# Patient Record
Sex: FEMALE | Race: WHITE | NOT HISPANIC OR LATINO | Employment: OTHER | URBAN - NONMETROPOLITAN AREA
[De-identification: names, ages, dates, MRNs, and addresses within clinical notes are randomized per-mention and may not be internally consistent; named-entity substitution may affect disease eponyms.]

---

## 2017-05-25 ENCOUNTER — OFFICE VISIT (OUTPATIENT)
Dept: CARDIOLOGY | Facility: CLINIC | Age: 72
End: 2017-05-25

## 2017-05-25 VITALS
BODY MASS INDEX: 24.92 KG/M2 | DIASTOLIC BLOOD PRESSURE: 70 MMHG | WEIGHT: 132 LBS | HEIGHT: 61 IN | HEART RATE: 66 BPM | SYSTOLIC BLOOD PRESSURE: 129 MMHG

## 2017-05-25 DIAGNOSIS — I48.0 AF (PAROXYSMAL ATRIAL FIBRILLATION) (HCC): Primary | ICD-10-CM

## 2017-05-25 DIAGNOSIS — I10 ESSENTIAL HYPERTENSION: ICD-10-CM

## 2017-05-25 PROCEDURE — 99212 OFFICE O/P EST SF 10 MIN: CPT | Performed by: INTERNAL MEDICINE

## 2018-05-31 ENCOUNTER — OFFICE VISIT (OUTPATIENT)
Dept: CARDIOLOGY | Facility: CLINIC | Age: 73
End: 2018-05-31

## 2018-05-31 VITALS
SYSTOLIC BLOOD PRESSURE: 124 MMHG | HEART RATE: 56 BPM | WEIGHT: 135 LBS | BODY MASS INDEX: 25.49 KG/M2 | DIASTOLIC BLOOD PRESSURE: 70 MMHG | HEIGHT: 61 IN

## 2018-05-31 DIAGNOSIS — I48.0 AF (PAROXYSMAL ATRIAL FIBRILLATION) (HCC): Primary | ICD-10-CM

## 2018-05-31 DIAGNOSIS — I10 ESSENTIAL HYPERTENSION: ICD-10-CM

## 2018-05-31 PROCEDURE — 99213 OFFICE O/P EST LOW 20 MIN: CPT | Performed by: INTERNAL MEDICINE

## 2018-05-31 RX ORDER — MELATONIN
DAILY
Status: ON HOLD | COMMUNITY
End: 2021-07-09

## 2018-05-31 RX ORDER — MULTIVIT-MIN/IRON/FOLIC ACID/K 18-600-40
2000 CAPSULE ORAL DAILY
Status: ON HOLD | COMMUNITY
End: 2021-07-09

## 2018-05-31 NOTE — PROGRESS NOTES
Subjective:   Kade Tillman  1945  [phone]      05/31/2018    [facility] CARDIOLOGY    Boaz Lorenzo MD  [address] [facility] [address]    REFERRING DOCTOR: Boaz Lorenzo      Patient ID: Kade Tillman is a 72 y.o. female.    Chief Complaint: Afib    Problem List:  Af (Paroxysmal Atrial Fibrillation)     A. Onset 2003 - had SAVANA in preparation for ECV, self-converted to NSR  B. No further episodes until fall 2015 during colonoscopy  C. CHADSVASC Score 3 (age 70, female, HTN)  D. Echo 4/28/16 - EF >65%, normal valves, LA 4cm  E. Failed flecainide & propafenone (recurrence) -  F. PVA 2016 Dr. Chow, no recurrent afib      Essential Hypertension         No Known Allergies    Current Outpatient Prescriptions:   •  Acetaminophen 500 MG coapsule, Take 1 tablet by mouth as needed., Disp: , Rfl:   •  aspirin 81 MG tablet, Take 1 tablet by mouth Daily., Disp: 30 tablet, Rfl: 11  •  B Complex Vitamins (VITAMIN B COMPLEX) tablet, Take 1 tablet by mouth daily., Disp: , Rfl:   •  Calcium 500-125 MG-UNIT tablet, Take 1 tablet by mouth daily., Disp: , Rfl:   •  Cholecalciferol (VITAMIN D) 2000 units capsule, Take 2,000 Units by mouth Daily., Disp: , Rfl:   •  fluticasone (FLONASE) 50 MCG/ACT nasal spray, 1 spray into each nostril daily as needed., Disp: , Rfl:   •  hydrochlorothiazide (HYDRODIURIL) 25 MG tablet, Take 25 mg by mouth daily., Disp: , Rfl:   •  levothyroxine (SYNTHROID, LEVOTHROID) 25 MCG tablet, Take 25 mcg by mouth daily., Disp: , Rfl:   •  lisinopril (PRINIVIL,ZESTRIL) 10 MG tablet, Take 10 mg by mouth daily.  Restart today, Disp: , Rfl:   •  Loratadine 10 MG capsule, Take 10 mg by mouth daily as needed., Disp: , Rfl:   •  Potassium Gluconate 550 MG tablet, Take 1 tablet by mouth daily., Disp: , Rfl:   •  pravastatin (PRAVACHOL) 40 MG tablet, Take 40 mg by mouth daily., Disp: , Rfl:   •  vitamin B-12 (CYANOCOBALAMIN) 1000 MCG tablet, Take 1 tablet by mouth  "daily., Disp: , Rfl:   •  vitamin C (ASCORBIC ACID) 500 MG tablet, Take 500 mg by mouth daily., Disp: , Rfl:   •  cholecalciferol (VITAMIN D3) 1000 units tablet, Take  by mouth Daily., Disp: , Rfl:     History of Present Illness  Patient is a 72 year old female with a history of paroxysmal symptomatic atrial fibrillation status post pulmonary vein ablation in the past and hypertension who is here today for follow-up visit.  Doing very well with no recurrence of any atrial fibrillation.  Blood pressure has been well-controlled as well.    No issues with chest pain, shortness of breath, fevers, chills, night sweats, PND, orthopnea or palpitations. No recent ER visits or hospital stays.      The following portions of the patient's history were reviewed and updated as appropriate: allergies, current medications, past family history, past medical history, past social history, past surgical history and problem list.    ROS   14 point ROS negative except as outlined in problem list, HPI and other parts of the note.    Procedures       Objective:       Vitals:    05/31/18 0937   BP: 124/70   BP Location: Right arm   Patient Position: Sitting   Pulse: 56   Weight: 61.2 kg (135 lb)   Height: 154.9 cm (61\")       GENERAL: Well-developed, well-nourished patient in no acute distress.  HEENT: Normocephalic, atraumatic, PERRLA. Moist mucous membranes.  NECK: No JVD present at 30°. No carotid bruits auscultated.  LUNGS: Clear to auscultation.  CARDIOVASCULAR: Heart has a regular rate and rhythm. No murmurs, gallops or rubs noted.   ABDOMEN: Soft, nontender. Positive bowel sounds.  MUSCULOSKELETAL: No gross deformities. No clubbing, cyanosis, or lower extremity edema.  SKIN: Pink, warm  Neuro: Nonfocal exam. Gait intact  Ext: No edema or bruising    The patient's old records including ambulatory rhythm recordings (ECGs, Holter/event monitor) were reviewed and discussed.      Lab Review:   Results for orders placed or performed in " visit on 07/18/16   CBC (No diff)   Result Value Ref Range    WBC 11.22 (H) 3.50 - 10.80 10*3/mm3    RBC 3.78 (L) 3.89 - 5.14 10*6/mm3    Hemoglobin 11.3 (L) 11.5 - 15.5 g/dL    Hematocrit 33.5 (L) 34.5 - 44.0 %    MCV 88.6 80.0 - 99.0 fL    MCH 29.9 27.0 - 31.0 pg    MCHC 33.7 32.0 - 36.0 g/dL    RDW 13.5 11.3 - 14.5 %    RDW-SD 43.5 37.0 - 54.0 fl    MPV 9.9 6.0 - 12.0 fL    Platelets 279 150 - 450 10*3/mm3   Basic metabolic panel   Result Value Ref Range    Glucose 101 (H) 70 - 100 mg/dL    BUN 17 9 - 23 mg/dL    Creatinine 0.60 0.60 - 1.30 mg/dL    Sodium 142 132 - 146 mmol/L    Potassium 4.0 3.5 - 5.5 mmol/L    Chloride 102 99 - 109 mmol/L    CO2 29.0 20.0 - 31.0 mmol/L    Calcium 10.1 8.7 - 10.4 mg/dL    eGFR Non African Amer 99 >60 mL/min/1.73    BUN/Creatinine Ratio 28.3 (H) 7.0 - 25.0    Anion Gap 11.0 3.0 - 11.0 mmol/L   DUPLEX GROIN PSEUDOANEURYSM CAR   Result Value Ref Range    Right CFA prox sys .0 cm/sec    PROX PFA PSV RIGHT -101.0 cm/sec    PROX SFA PSV RIGHT -90.8 cm/sec    MID SFA PSV RIGHT -105.0 cm/sec    DIST SFA PSV RIGHT -99.2 cm/sec    PROX POP A PSV RIGHT 62.4 cm/sec    DIST POP A PSV RIGHT -57.2 cm/sec           Diagnosis:   1. AF (paroxysmal atrial fibrillation)  2. Essential hypertension      Assessment & Plan:   1.  Paroxysmal symptomatic atrial fibrillation with failed antiarrhythmic drugs now status post pulmonary vein ablation by Dr. Chow no recurrences.  She is doing well.  We will continue on just aspirin for now.  2.  Hypertension controlled continue on current therapy.  3.  Hypothyroidism on supplement.  4.  Follow up as needed.         CC: Boaz White,    05/31/18  9:44 AM      EMR Dragon/Transcription disclaimer:  Much of this encounter note is an electronic transcription/translation of spoken language to printed text. Electronic translation of spoken language may permit erroneous, or at times, nonsensical words or phrases to be inadvertently  transcribed. Although I have reviewed the note for such errors, some may still exist.

## 2021-06-11 ENCOUNTER — APPOINTMENT (OUTPATIENT)
Dept: GENERAL RADIOLOGY | Facility: HOSPITAL | Age: 76
End: 2021-06-11

## 2021-06-11 ENCOUNTER — APPOINTMENT (OUTPATIENT)
Dept: CT IMAGING | Facility: HOSPITAL | Age: 76
End: 2021-06-11

## 2021-06-11 ENCOUNTER — HOSPITAL ENCOUNTER (INPATIENT)
Facility: HOSPITAL | Age: 76
LOS: 2 days | Discharge: HOME | End: 2021-06-13
Attending: EMERGENCY MEDICINE | Admitting: INTERNAL MEDICINE

## 2021-06-11 DIAGNOSIS — R55 SYNCOPE, UNSPECIFIED SYNCOPE TYPE: ICD-10-CM

## 2021-06-11 DIAGNOSIS — R53.1 GENERALIZED WEAKNESS: ICD-10-CM

## 2021-06-11 DIAGNOSIS — K92.2 ACUTE UPPER GI BLEED: Primary | ICD-10-CM

## 2021-06-11 DIAGNOSIS — K92.0 HEMATEMESIS WITH NAUSEA: ICD-10-CM

## 2021-06-11 DIAGNOSIS — E87.6 HYPOKALEMIA: ICD-10-CM

## 2021-06-11 DIAGNOSIS — K31.89 GASTRIC MASS: ICD-10-CM

## 2021-06-11 PROBLEM — D72.829 LEUKOCYTOSIS: Status: ACTIVE | Noted: 2021-06-11

## 2021-06-11 LAB
ABO GROUP BLD: NORMAL
ALBUMIN SERPL-MCNC: 3.9 G/DL (ref 3.5–5.2)
ALBUMIN/GLOB SERPL: 1.6 G/DL
ALP SERPL-CCNC: 66 U/L (ref 39–117)
ALT SERPL W P-5'-P-CCNC: 12 U/L (ref 1–33)
ANION GAP SERPL CALCULATED.3IONS-SCNC: 11 MMOL/L (ref 5–15)
AST SERPL-CCNC: 17 U/L (ref 1–32)
BASOPHILS # BLD AUTO: 0.07 10*3/MM3 (ref 0–0.2)
BASOPHILS NFR BLD AUTO: 0.4 % (ref 0–1.5)
BILIRUB SERPL-MCNC: 0.2 MG/DL (ref 0–1.2)
BLD GP AB SCN SERPL QL: NEGATIVE
BUN SERPL-MCNC: 51 MG/DL (ref 8–23)
BUN/CREAT SERPL: 85 (ref 7–25)
CALCIUM SPEC-SCNC: 10.3 MG/DL (ref 8.6–10.5)
CHLORIDE SERPL-SCNC: 97 MMOL/L (ref 98–107)
CO2 SERPL-SCNC: 27 MMOL/L (ref 22–29)
CREAT SERPL-MCNC: 0.6 MG/DL (ref 0.57–1)
D-LACTATE SERPL-SCNC: 1.6 MMOL/L (ref 0.5–2)
DEPRECATED RDW RBC AUTO: 44.9 FL (ref 37–54)
EOSINOPHIL # BLD AUTO: 0.04 10*3/MM3 (ref 0–0.4)
EOSINOPHIL NFR BLD AUTO: 0.2 % (ref 0.3–6.2)
ERYTHROCYTE [DISTWIDTH] IN BLOOD BY AUTOMATED COUNT: 13.5 % (ref 12.3–15.4)
FLUAV RNA RESP QL NAA+PROBE: NOT DETECTED
FLUBV RNA RESP QL NAA+PROBE: NOT DETECTED
GFR SERPL CREATININE-BSD FRML MDRD: 97 ML/MIN/1.73
GLOBULIN UR ELPH-MCNC: 2.4 GM/DL
GLUCOSE SERPL-MCNC: 129 MG/DL (ref 65–99)
HCT VFR BLD AUTO: 33.6 % (ref 34–46.6)
HGB BLD-MCNC: 10.9 G/DL (ref 12–15.9)
HOLD SPECIMEN: NORMAL
HOLD SPECIMEN: NORMAL
IMM GRANULOCYTES # BLD AUTO: 0.08 10*3/MM3 (ref 0–0.05)
IMM GRANULOCYTES NFR BLD AUTO: 0.5 % (ref 0–0.5)
LIPASE SERPL-CCNC: 28 U/L (ref 13–60)
LYMPHOCYTES # BLD AUTO: 3.62 10*3/MM3 (ref 0.7–3.1)
LYMPHOCYTES NFR BLD AUTO: 21 % (ref 19.6–45.3)
MAGNESIUM SERPL-MCNC: 2.1 MG/DL (ref 1.6–2.4)
MCH RBC QN AUTO: 29.5 PG (ref 26.6–33)
MCHC RBC AUTO-ENTMCNC: 32.4 G/DL (ref 31.5–35.7)
MCV RBC AUTO: 90.8 FL (ref 79–97)
MONOCYTES # BLD AUTO: 1.14 10*3/MM3 (ref 0.1–0.9)
MONOCYTES NFR BLD AUTO: 6.6 % (ref 5–12)
NEUTROPHILS NFR BLD AUTO: 12.25 10*3/MM3 (ref 1.7–7)
NEUTROPHILS NFR BLD AUTO: 71.3 % (ref 42.7–76)
NRBC BLD AUTO-RTO: 0 /100 WBC (ref 0–0.2)
PLATELET # BLD AUTO: 224 10*3/MM3 (ref 140–450)
PMV BLD AUTO: 10.5 FL (ref 6–12)
POTASSIUM SERPL-SCNC: 3.2 MMOL/L (ref 3.5–5.2)
PROCALCITONIN SERPL-MCNC: 0.04 NG/ML (ref 0–0.25)
PROT SERPL-MCNC: 6.3 G/DL (ref 6–8.5)
RBC # BLD AUTO: 3.7 10*6/MM3 (ref 3.77–5.28)
RH BLD: POSITIVE
SARS-COV-2 RNA RESP QL NAA+PROBE: NOT DETECTED
SODIUM SERPL-SCNC: 135 MMOL/L (ref 136–145)
T&S EXPIRATION DATE: NORMAL
WBC # BLD AUTO: 17.2 10*3/MM3 (ref 3.4–10.8)
WHOLE BLOOD HOLD SPECIMEN: NORMAL

## 2021-06-11 PROCEDURE — 85025 COMPLETE CBC W/AUTO DIFF WBC: CPT

## 2021-06-11 PROCEDURE — 86900 BLOOD TYPING SEROLOGIC ABO: CPT

## 2021-06-11 PROCEDURE — 99223 1ST HOSP IP/OBS HIGH 75: CPT | Performed by: INTERNAL MEDICINE

## 2021-06-11 PROCEDURE — 83735 ASSAY OF MAGNESIUM: CPT | Performed by: PHYSICIAN ASSISTANT

## 2021-06-11 PROCEDURE — 86850 RBC ANTIBODY SCREEN: CPT | Performed by: EMERGENCY MEDICINE

## 2021-06-11 PROCEDURE — 87636 SARSCOV2 & INF A&B AMP PRB: CPT | Performed by: EMERGENCY MEDICINE

## 2021-06-11 PROCEDURE — 99284 EMERGENCY DEPT VISIT MOD MDM: CPT

## 2021-06-11 PROCEDURE — 83690 ASSAY OF LIPASE: CPT | Performed by: EMERGENCY MEDICINE

## 2021-06-11 PROCEDURE — 74177 CT ABD & PELVIS W/CONTRAST: CPT

## 2021-06-11 PROCEDURE — 86901 BLOOD TYPING SEROLOGIC RH(D): CPT

## 2021-06-11 PROCEDURE — 86900 BLOOD TYPING SEROLOGIC ABO: CPT | Performed by: EMERGENCY MEDICINE

## 2021-06-11 PROCEDURE — 80053 COMPREHEN METABOLIC PANEL: CPT | Performed by: EMERGENCY MEDICINE

## 2021-06-11 PROCEDURE — 71045 X-RAY EXAM CHEST 1 VIEW: CPT

## 2021-06-11 PROCEDURE — 86923 COMPATIBILITY TEST ELECTRIC: CPT

## 2021-06-11 PROCEDURE — 83605 ASSAY OF LACTIC ACID: CPT | Performed by: EMERGENCY MEDICINE

## 2021-06-11 PROCEDURE — 25010000002 IOPAMIDOL 61 % SOLUTION: Performed by: EMERGENCY MEDICINE

## 2021-06-11 PROCEDURE — 85025 COMPLETE CBC W/AUTO DIFF WBC: CPT | Performed by: INTERNAL MEDICINE

## 2021-06-11 PROCEDURE — 84145 PROCALCITONIN (PCT): CPT | Performed by: FAMILY MEDICINE

## 2021-06-11 PROCEDURE — 93005 ELECTROCARDIOGRAM TRACING: CPT | Performed by: EMERGENCY MEDICINE

## 2021-06-11 PROCEDURE — 86901 BLOOD TYPING SEROLOGIC RH(D): CPT | Performed by: EMERGENCY MEDICINE

## 2021-06-11 PROCEDURE — 93005 ELECTROCARDIOGRAM TRACING: CPT

## 2021-06-11 RX ORDER — SODIUM CHLORIDE, SODIUM LACTATE, POTASSIUM CHLORIDE, CALCIUM CHLORIDE 600; 310; 30; 20 MG/100ML; MG/100ML; MG/100ML; MG/100ML
100 INJECTION, SOLUTION INTRAVENOUS CONTINUOUS
Status: DISCONTINUED | OUTPATIENT
Start: 2021-06-12 | End: 2021-06-13

## 2021-06-11 RX ORDER — LEVOTHYROXINE SODIUM 0.03 MG/1
25 TABLET ORAL
Status: DISCONTINUED | OUTPATIENT
Start: 2021-06-12 | End: 2021-06-13 | Stop reason: HOSPADM

## 2021-06-11 RX ORDER — PANTOPRAZOLE SODIUM 40 MG/10ML
80 INJECTION, POWDER, LYOPHILIZED, FOR SOLUTION INTRAVENOUS ONCE
Status: COMPLETED | OUTPATIENT
Start: 2021-06-11 | End: 2021-06-11

## 2021-06-11 RX ORDER — SODIUM CHLORIDE 0.9 % (FLUSH) 0.9 %
10 SYRINGE (ML) INJECTION EVERY 12 HOURS SCHEDULED
Status: DISCONTINUED | OUTPATIENT
Start: 2021-06-12 | End: 2021-06-13 | Stop reason: HOSPADM

## 2021-06-11 RX ORDER — ONDANSETRON 2 MG/ML
4 INJECTION INTRAMUSCULAR; INTRAVENOUS EVERY 6 HOURS PRN
Status: DISCONTINUED | OUTPATIENT
Start: 2021-06-11 | End: 2021-06-13 | Stop reason: HOSPADM

## 2021-06-11 RX ORDER — POTASSIUM CHLORIDE 750 MG/1
40 CAPSULE, EXTENDED RELEASE ORAL AS NEEDED
Status: DISCONTINUED | OUTPATIENT
Start: 2021-06-11 | End: 2021-06-13 | Stop reason: HOSPADM

## 2021-06-11 RX ORDER — SODIUM CHLORIDE 9 MG/ML
10 INJECTION INTRAVENOUS AS NEEDED
Status: DISCONTINUED | OUTPATIENT
Start: 2021-06-11 | End: 2021-06-13 | Stop reason: HOSPADM

## 2021-06-11 RX ORDER — SODIUM CHLORIDE 0.9 % (FLUSH) 0.9 %
10 SYRINGE (ML) INJECTION AS NEEDED
Status: DISCONTINUED | OUTPATIENT
Start: 2021-06-11 | End: 2021-06-13 | Stop reason: HOSPADM

## 2021-06-11 RX ORDER — CHOLECALCIFEROL (VITAMIN D3) 125 MCG
5 CAPSULE ORAL NIGHTLY PRN
Status: DISCONTINUED | OUTPATIENT
Start: 2021-06-11 | End: 2021-06-13 | Stop reason: HOSPADM

## 2021-06-11 RX ORDER — POTASSIUM CHLORIDE 7.45 MG/ML
10 INJECTION INTRAVENOUS
Status: DISCONTINUED | OUTPATIENT
Start: 2021-06-11 | End: 2021-06-13 | Stop reason: HOSPADM

## 2021-06-11 RX ORDER — POTASSIUM CHLORIDE 1.5 G/1.77G
40 POWDER, FOR SOLUTION ORAL AS NEEDED
Status: DISCONTINUED | OUTPATIENT
Start: 2021-06-11 | End: 2021-06-13 | Stop reason: HOSPADM

## 2021-06-11 RX ORDER — PRAVASTATIN SODIUM 40 MG
40 TABLET ORAL DAILY
Status: DISCONTINUED | OUTPATIENT
Start: 2021-06-12 | End: 2021-06-13 | Stop reason: HOSPADM

## 2021-06-11 RX ADMIN — PANTOPRAZOLE SODIUM 80 MG: 40 INJECTION, POWDER, FOR SOLUTION INTRAVENOUS at 21:24

## 2021-06-11 RX ADMIN — IOPAMIDOL 95 ML: 612 INJECTION, SOLUTION INTRAVENOUS at 22:22

## 2021-06-11 RX ADMIN — SODIUM CHLORIDE, POTASSIUM CHLORIDE, SODIUM LACTATE AND CALCIUM CHLORIDE 100 ML/HR: 600; 310; 30; 20 INJECTION, SOLUTION INTRAVENOUS at 23:53

## 2021-06-11 RX ADMIN — SODIUM CHLORIDE 1000 ML: 9 INJECTION, SOLUTION INTRAVENOUS at 21:24

## 2021-06-12 ENCOUNTER — ANESTHESIA EVENT (OUTPATIENT)
Dept: GASTROENTEROLOGY | Facility: HOSPITAL | Age: 76
End: 2021-06-12

## 2021-06-12 ENCOUNTER — APPOINTMENT (OUTPATIENT)
Dept: CARDIOLOGY | Facility: HOSPITAL | Age: 76
End: 2021-06-12

## 2021-06-12 ENCOUNTER — ANESTHESIA (OUTPATIENT)
Dept: GASTROENTEROLOGY | Facility: HOSPITAL | Age: 76
End: 2021-06-12

## 2021-06-12 ENCOUNTER — APPOINTMENT (OUTPATIENT)
Dept: CT IMAGING | Facility: HOSPITAL | Age: 76
End: 2021-06-12

## 2021-06-12 PROBLEM — R93.5 ABNORMAL CT OF THE ABDOMEN: Status: ACTIVE | Noted: 2021-06-12

## 2021-06-12 LAB
ABO GROUP BLD: NORMAL
ANION GAP SERPL CALCULATED.3IONS-SCNC: 8 MMOL/L (ref 5–15)
BACTERIA UR QL AUTO: NORMAL /HPF
BASOPHILS # BLD AUTO: 0.03 10*3/MM3 (ref 0–0.2)
BASOPHILS NFR BLD AUTO: 0.3 % (ref 0–1.5)
BH CV ECHO MEAS - AO MAX PG (FULL): 4.7 MMHG
BH CV ECHO MEAS - AO MAX PG: 11 MMHG
BH CV ECHO MEAS - AO MEAN PG (FULL): 2.8 MMHG
BH CV ECHO MEAS - AO MEAN PG: 5.8 MMHG
BH CV ECHO MEAS - AO ROOT AREA (BSA CORRECTED): 2
BH CV ECHO MEAS - AO ROOT AREA: 7.5 CM^2
BH CV ECHO MEAS - AO ROOT DIAM: 3.1 CM
BH CV ECHO MEAS - AO V2 MAX: 168.3 CM/SEC
BH CV ECHO MEAS - AO V2 MEAN: 114.8 CM/SEC
BH CV ECHO MEAS - AO V2 VTI: 30.6 CM
BH CV ECHO MEAS - AVA(I,A): 2.1 CM^2
BH CV ECHO MEAS - AVA(I,D): 2.1 CM^2
BH CV ECHO MEAS - AVA(V,A): 2.3 CM^2
BH CV ECHO MEAS - AVA(V,D): 2.3 CM^2
BH CV ECHO MEAS - BSA(HAYCOCK): 1.6 M^2
BH CV ECHO MEAS - BSA: 1.6 M^2
BH CV ECHO MEAS - BZI_BMI: 26.5 KILOGRAMS/M^2
BH CV ECHO MEAS - BZI_METRIC_HEIGHT: 152 CM
BH CV ECHO MEAS - BZI_METRIC_WEIGHT: 61.2 KG
BH CV ECHO MEAS - CI(CUBED): 3.5 L/MIN/M^2
BH CV ECHO MEAS - CI(MOD-SP2): 1.7 L/MIN/M^2
BH CV ECHO MEAS - CI(MOD-SP4): 3 L/MIN/M^2
BH CV ECHO MEAS - CI(TEICH): 3.1 L/MIN/M^2
BH CV ECHO MEAS - CO(CUBED): 5.5 L/MIN
BH CV ECHO MEAS - CO(MOD-SP2): 2.7 L/MIN
BH CV ECHO MEAS - CO(MOD-SP4): 4.8 L/MIN
BH CV ECHO MEAS - CO(TEICH): 4.9 L/MIN
BH CV ECHO MEAS - EDV(CUBED): 94.2 ML
BH CV ECHO MEAS - EDV(MOD-SP2): 46.8 ML
BH CV ECHO MEAS - EDV(MOD-SP4): 84.7 ML
BH CV ECHO MEAS - EDV(TEICH): 94.9 ML
BH CV ECHO MEAS - EF(CUBED): 72.8 %
BH CV ECHO MEAS - EF(MOD-SP2): 72 %
BH CV ECHO MEAS - EF(MOD-SP4): 70.8 %
BH CV ECHO MEAS - EF(TEICH): 64.7 %
BH CV ECHO MEAS - ESV(CUBED): 25.6 ML
BH CV ECHO MEAS - ESV(MOD-SP2): 13.1 ML
BH CV ECHO MEAS - ESV(MOD-SP4): 24.7 ML
BH CV ECHO MEAS - ESV(TEICH): 33.5 ML
BH CV ECHO MEAS - FS: 35.2 %
BH CV ECHO MEAS - IVS/LVPW: 0.95
BH CV ECHO MEAS - IVSD: 0.84 CM
BH CV ECHO MEAS - LA DIMENSION: 3.6 CM
BH CV ECHO MEAS - LA/AO: 1.2
BH CV ECHO MEAS - LAT PEAK E' VEL: 15.4 CM/SEC
BH CV ECHO MEAS - LATERAL E/E' RATIO: 4.5
BH CV ECHO MEAS - LV DIASTOLIC VOL/BSA (35-75): 53.7 ML/M^2
BH CV ECHO MEAS - LV MASS(C)D: 127.6 GRAMS
BH CV ECHO MEAS - LV MASS(C)DI: 80.9 GRAMS/M^2
BH CV ECHO MEAS - LV MAX PG: 6.3 MMHG
BH CV ECHO MEAS - LV MEAN PG: 3 MMHG
BH CV ECHO MEAS - LV SYSTOLIC VOL/BSA (12-30): 15.7 ML/M^2
BH CV ECHO MEAS - LV V1 MAX: 125.8 CM/SEC
BH CV ECHO MEAS - LV V1 MEAN: 81.9 CM/SEC
BH CV ECHO MEAS - LV V1 VTI: 20.3 CM
BH CV ECHO MEAS - LVIDD: 4.6 CM
BH CV ECHO MEAS - LVIDS: 2.9 CM
BH CV ECHO MEAS - LVLD AP2: 6.5 CM
BH CV ECHO MEAS - LVLD AP4: 7.1 CM
BH CV ECHO MEAS - LVLS AP2: 5.2 CM
BH CV ECHO MEAS - LVLS AP4: 6.6 CM
BH CV ECHO MEAS - LVOT AREA (M): 3.1 CM^2
BH CV ECHO MEAS - LVOT AREA: 3.1 CM^2
BH CV ECHO MEAS - LVOT DIAM: 2 CM
BH CV ECHO MEAS - LVPWD: 0.88 CM
BH CV ECHO MEAS - MED PEAK E' VEL: 10.4 CM/SEC
BH CV ECHO MEAS - MEDIAL E/E' RATIO: 6.7
BH CV ECHO MEAS - MM HR: 80 BPM
BH CV ECHO MEAS - MM R-R INT: 0.75 SEC
BH CV ECHO MEAS - MV A MAX VEL: 71.1 CM/SEC
BH CV ECHO MEAS - MV DEC TIME: 0.3 SEC
BH CV ECHO MEAS - MV E MAX VEL: 70 CM/SEC
BH CV ECHO MEAS - MV E/A: 0.98
BH CV ECHO MEAS - MV MAX PG: 2 MMHG
BH CV ECHO MEAS - MV MEAN PG: 2 MMHG
BH CV ECHO MEAS - PA ACC TIME: 0.08 SEC
BH CV ECHO MEAS - PA PR(ACCEL): 41 MMHG
BH CV ECHO MEAS - RAP SYSTOLE: 3 MMHG
BH CV ECHO MEAS - RVSP: 41 MMHG
BH CV ECHO MEAS - SI(AO): 146.4 ML/M^2
BH CV ECHO MEAS - SI(CUBED): 43.5 ML/M^2
BH CV ECHO MEAS - SI(LVOT): 40.3 ML/M^2
BH CV ECHO MEAS - SI(MOD-SP2): 21.4 ML/M^2
BH CV ECHO MEAS - SI(MOD-SP4): 38.1 ML/M^2
BH CV ECHO MEAS - SI(TEICH): 38.9 ML/M^2
BH CV ECHO MEAS - SV(AO): 230.7 ML
BH CV ECHO MEAS - SV(CUBED): 68.6 ML
BH CV ECHO MEAS - SV(LVOT): 63.5 ML
BH CV ECHO MEAS - SV(MOD-SP2): 33.7 ML
BH CV ECHO MEAS - SV(MOD-SP4): 60 ML
BH CV ECHO MEAS - SV(TEICH): 61.4 ML
BH CV ECHO MEAS - TAPSE (>1.6): 2.2 CM
BH CV ECHO MEAS - TR MAX PG: 38 MMHG
BH CV ECHO MEAS - TR MAX VEL: 307.4 CM/SEC
BH CV ECHO MEASUREMENTS AVERAGE E/E' RATIO: 5.43
BH CV XLRA - RV BASE: 4 CM
BH CV XLRA - RV LENGTH: 7.2 CM
BH CV XLRA - RV MID: 3.6 CM
BH CV XLRA - TDI S': 20.2 CM/SEC
BILIRUB UR QL STRIP: NEGATIVE
BUN SERPL-MCNC: 33 MG/DL (ref 8–23)
BUN/CREAT SERPL: 76.7 (ref 7–25)
CALCIUM SPEC-SCNC: 8.8 MG/DL (ref 8.6–10.5)
CHLORIDE SERPL-SCNC: 106 MMOL/L (ref 98–107)
CLARITY UR: CLEAR
CO2 SERPL-SCNC: 26 MMOL/L (ref 22–29)
COLOR UR: YELLOW
CREAT SERPL-MCNC: 0.43 MG/DL (ref 0.57–1)
DEPRECATED RDW RBC AUTO: 44.1 FL (ref 37–54)
DEPRECATED RDW RBC AUTO: 46 FL (ref 37–54)
EOSINOPHIL # BLD AUTO: 0.01 10*3/MM3 (ref 0–0.4)
EOSINOPHIL NFR BLD AUTO: 0.1 % (ref 0.3–6.2)
ERYTHROCYTE [DISTWIDTH] IN BLOOD BY AUTOMATED COUNT: 13.4 % (ref 12.3–15.4)
ERYTHROCYTE [DISTWIDTH] IN BLOOD BY AUTOMATED COUNT: 13.6 % (ref 12.3–15.4)
GFR SERPL CREATININE-BSD FRML MDRD: 143 ML/MIN/1.73
GLUCOSE SERPL-MCNC: 103 MG/DL (ref 65–99)
GLUCOSE UR STRIP-MCNC: NEGATIVE MG/DL
HCT VFR BLD AUTO: 26.7 % (ref 34–46.6)
HCT VFR BLD AUTO: 27.4 % (ref 34–46.6)
HCT VFR BLD AUTO: 28.1 % (ref 34–46.6)
HCT VFR BLD AUTO: 28.2 % (ref 34–46.6)
HCT VFR BLD AUTO: 28.2 % (ref 34–46.6)
HGB BLD-MCNC: 8.9 G/DL (ref 12–15.9)
HGB BLD-MCNC: 9 G/DL (ref 12–15.9)
HGB UR QL STRIP.AUTO: NEGATIVE
HOLD SPECIMEN: NORMAL
IMM GRANULOCYTES # BLD AUTO: 0.04 10*3/MM3 (ref 0–0.05)
IMM GRANULOCYTES NFR BLD AUTO: 0.3 % (ref 0–0.5)
KETONES UR QL STRIP: NEGATIVE
LEUKOCYTE ESTERASE UR QL STRIP.AUTO: ABNORMAL
LV EF 2D ECHO EST: 60 %
LYMPHOCYTES # BLD AUTO: 2.27 10*3/MM3 (ref 0.7–3.1)
LYMPHOCYTES NFR BLD AUTO: 19.8 % (ref 19.6–45.3)
MAXIMAL PREDICTED HEART RATE: 145 BPM
MCH RBC QN AUTO: 28.9 PG (ref 26.6–33)
MCH RBC QN AUTO: 29.5 PG (ref 26.6–33)
MCHC RBC AUTO-ENTMCNC: 31.9 G/DL (ref 31.5–35.7)
MCHC RBC AUTO-ENTMCNC: 32 G/DL (ref 31.5–35.7)
MCV RBC AUTO: 90.4 FL (ref 79–97)
MCV RBC AUTO: 92.5 FL (ref 79–97)
MONOCYTES # BLD AUTO: 0.48 10*3/MM3 (ref 0.1–0.9)
MONOCYTES NFR BLD AUTO: 4.2 % (ref 5–12)
NEUTROPHILS NFR BLD AUTO: 75.3 % (ref 42.7–76)
NEUTROPHILS NFR BLD AUTO: 8.66 10*3/MM3 (ref 1.7–7)
NITRITE UR QL STRIP: NEGATIVE
NRBC BLD AUTO-RTO: 0 /100 WBC (ref 0–0.2)
PH UR STRIP.AUTO: 5.5 [PH] (ref 5–8)
PLATELET # BLD AUTO: 191 10*3/MM3 (ref 140–450)
PLATELET # BLD AUTO: 192 10*3/MM3 (ref 140–450)
PMV BLD AUTO: 10.6 FL (ref 6–12)
PMV BLD AUTO: 10.7 FL (ref 6–12)
POTASSIUM SERPL-SCNC: 3.6 MMOL/L (ref 3.5–5.2)
PROT UR QL STRIP: NEGATIVE
QT INTERVAL: 386 MS
QTC INTERVAL: 404 MS
RBC # BLD AUTO: 3.05 10*6/MM3 (ref 3.77–5.28)
RBC # BLD AUTO: 3.11 10*6/MM3 (ref 3.77–5.28)
RBC # UR: NORMAL /HPF
REF LAB TEST METHOD: NORMAL
RH BLD: POSITIVE
SODIUM SERPL-SCNC: 140 MMOL/L (ref 136–145)
SP GR UR STRIP: 1.03 (ref 1–1.03)
SQUAMOUS #/AREA URNS HPF: NORMAL /[HPF]
STRESS TARGET HR: 123 BPM
UROBILINOGEN UR QL STRIP: ABNORMAL
WBC # BLD AUTO: 10.43 10*3/MM3 (ref 3.4–10.8)
WBC # BLD AUTO: 11.49 10*3/MM3 (ref 3.4–10.8)
WBC UR QL AUTO: NORMAL /HPF

## 2021-06-12 PROCEDURE — 93306 TTE W/DOPPLER COMPLETE: CPT

## 2021-06-12 PROCEDURE — 85014 HEMATOCRIT: CPT | Performed by: PHYSICIAN ASSISTANT

## 2021-06-12 PROCEDURE — 80048 BASIC METABOLIC PNL TOTAL CA: CPT | Performed by: PHYSICIAN ASSISTANT

## 2021-06-12 PROCEDURE — 25010000002 SUCCINYLCHOLINE PER 20 MG: Performed by: NURSE ANESTHETIST, CERTIFIED REGISTERED

## 2021-06-12 PROCEDURE — 25010000002 PROPOFOL 10 MG/ML EMULSION: Performed by: NURSE ANESTHETIST, CERTIFIED REGISTERED

## 2021-06-12 PROCEDURE — 99223 1ST HOSP IP/OBS HIGH 75: CPT | Performed by: NURSE PRACTITIONER

## 2021-06-12 PROCEDURE — 88184 FLOWCYTOMETRY/ TC 1 MARKER: CPT

## 2021-06-12 PROCEDURE — 85014 HEMATOCRIT: CPT | Performed by: INTERNAL MEDICINE

## 2021-06-12 PROCEDURE — 43239 EGD BIOPSY SINGLE/MULTIPLE: CPT | Performed by: INTERNAL MEDICINE

## 2021-06-12 PROCEDURE — 70450 CT HEAD/BRAIN W/O DYE: CPT

## 2021-06-12 PROCEDURE — 93306 TTE W/DOPPLER COMPLETE: CPT | Performed by: INTERNAL MEDICINE

## 2021-06-12 PROCEDURE — 25010000002 ONDANSETRON PER 1 MG: Performed by: NURSE ANESTHETIST, CERTIFIED REGISTERED

## 2021-06-12 PROCEDURE — 88305 TISSUE EXAM BY PATHOLOGIST: CPT | Performed by: INTERNAL MEDICINE

## 2021-06-12 PROCEDURE — 85018 HEMOGLOBIN: CPT | Performed by: PHYSICIAN ASSISTANT

## 2021-06-12 PROCEDURE — 88185 FLOWCYTOMETRY/TC ADD-ON: CPT

## 2021-06-12 PROCEDURE — 25010000002 DEXAMETHASONE PER 1 MG: Performed by: NURSE ANESTHETIST, CERTIFIED REGISTERED

## 2021-06-12 PROCEDURE — 0DB68ZX EXCISION OF STOMACH, VIA NATURAL OR ARTIFICIAL OPENING ENDOSCOPIC, DIAGNOSTIC: ICD-10-PCS | Performed by: INTERNAL MEDICINE

## 2021-06-12 PROCEDURE — 85027 COMPLETE CBC AUTOMATED: CPT | Performed by: PHYSICIAN ASSISTANT

## 2021-06-12 PROCEDURE — 99233 SBSQ HOSP IP/OBS HIGH 50: CPT | Performed by: INTERNAL MEDICINE

## 2021-06-12 PROCEDURE — 85018 HEMOGLOBIN: CPT | Performed by: INTERNAL MEDICINE

## 2021-06-12 PROCEDURE — 81001 URINALYSIS AUTO W/SCOPE: CPT | Performed by: PHYSICIAN ASSISTANT

## 2021-06-12 RX ORDER — ONDANSETRON 2 MG/ML
INJECTION INTRAMUSCULAR; INTRAVENOUS AS NEEDED
Status: DISCONTINUED | OUTPATIENT
Start: 2021-06-12 | End: 2021-06-12 | Stop reason: SURG

## 2021-06-12 RX ORDER — PANTOPRAZOLE SODIUM 40 MG/10ML
40 INJECTION, POWDER, LYOPHILIZED, FOR SOLUTION INTRAVENOUS EVERY 12 HOURS SCHEDULED
Status: DISCONTINUED | OUTPATIENT
Start: 2021-06-12 | End: 2021-06-13 | Stop reason: HOSPADM

## 2021-06-12 RX ORDER — MIDAZOLAM HYDROCHLORIDE 1 MG/ML
0.5 INJECTION INTRAMUSCULAR; INTRAVENOUS
Status: DISCONTINUED | OUTPATIENT
Start: 2021-06-12 | End: 2021-06-12 | Stop reason: HOSPADM

## 2021-06-12 RX ORDER — SODIUM CHLORIDE, SODIUM LACTATE, POTASSIUM CHLORIDE, CALCIUM CHLORIDE 600; 310; 30; 20 MG/100ML; MG/100ML; MG/100ML; MG/100ML
9 INJECTION, SOLUTION INTRAVENOUS CONTINUOUS
Status: DISCONTINUED | OUTPATIENT
Start: 2021-06-12 | End: 2021-06-13 | Stop reason: HOSPADM

## 2021-06-12 RX ORDER — PROPOFOL 10 MG/ML
VIAL (ML) INTRAVENOUS AS NEEDED
Status: DISCONTINUED | OUTPATIENT
Start: 2021-06-12 | End: 2021-06-12 | Stop reason: SURG

## 2021-06-12 RX ORDER — SODIUM CHLORIDE 0.9 % (FLUSH) 0.9 %
10 SYRINGE (ML) INJECTION EVERY 12 HOURS SCHEDULED
Status: DISCONTINUED | OUTPATIENT
Start: 2021-06-12 | End: 2021-06-12 | Stop reason: HOSPADM

## 2021-06-12 RX ORDER — FAMOTIDINE 20 MG/1
20 TABLET, FILM COATED ORAL ONCE
Status: DISCONTINUED | OUTPATIENT
Start: 2021-06-12 | End: 2021-06-12 | Stop reason: HOSPADM

## 2021-06-12 RX ORDER — SODIUM CHLORIDE 0.9 % (FLUSH) 0.9 %
10 SYRINGE (ML) INJECTION AS NEEDED
Status: DISCONTINUED | OUTPATIENT
Start: 2021-06-12 | End: 2021-06-12 | Stop reason: HOSPADM

## 2021-06-12 RX ORDER — SUCCINYLCHOLINE CHLORIDE 20 MG/ML
INJECTION INTRAMUSCULAR; INTRAVENOUS AS NEEDED
Status: DISCONTINUED | OUTPATIENT
Start: 2021-06-12 | End: 2021-06-12 | Stop reason: SURG

## 2021-06-12 RX ORDER — DEXAMETHASONE SODIUM PHOSPHATE 4 MG/ML
INJECTION, SOLUTION INTRA-ARTICULAR; INTRALESIONAL; INTRAMUSCULAR; INTRAVENOUS; SOFT TISSUE AS NEEDED
Status: DISCONTINUED | OUTPATIENT
Start: 2021-06-12 | End: 2021-06-12 | Stop reason: SURG

## 2021-06-12 RX ORDER — LIDOCAINE HYDROCHLORIDE 10 MG/ML
0.5 INJECTION, SOLUTION EPIDURAL; INFILTRATION; INTRACAUDAL; PERINEURAL ONCE AS NEEDED
Status: DISCONTINUED | OUTPATIENT
Start: 2021-06-12 | End: 2021-06-12 | Stop reason: HOSPADM

## 2021-06-12 RX ORDER — LIDOCAINE HYDROCHLORIDE 10 MG/ML
INJECTION, SOLUTION EPIDURAL; INFILTRATION; INTRACAUDAL; PERINEURAL AS NEEDED
Status: DISCONTINUED | OUTPATIENT
Start: 2021-06-12 | End: 2021-06-12 | Stop reason: SURG

## 2021-06-12 RX ORDER — ROCURONIUM BROMIDE 10 MG/ML
INJECTION, SOLUTION INTRAVENOUS AS NEEDED
Status: DISCONTINUED | OUTPATIENT
Start: 2021-06-12 | End: 2021-06-12 | Stop reason: SURG

## 2021-06-12 RX ORDER — ONDANSETRON 2 MG/ML
4 INJECTION INTRAMUSCULAR; INTRAVENOUS ONCE AS NEEDED
Status: DISCONTINUED | OUTPATIENT
Start: 2021-06-12 | End: 2021-06-12 | Stop reason: HOSPADM

## 2021-06-12 RX ADMIN — Medication 100 MG: at 09:48

## 2021-06-12 RX ADMIN — SODIUM CHLORIDE, PRESERVATIVE FREE 10 ML: 5 INJECTION INTRAVENOUS at 20:29

## 2021-06-12 RX ADMIN — POTASSIUM CHLORIDE 40 MEQ: 1.5 POWDER, FOR SOLUTION ORAL at 11:02

## 2021-06-12 RX ADMIN — ROCURONIUM BROMIDE 5 MG: 10 INJECTION INTRAVENOUS at 09:48

## 2021-06-12 RX ADMIN — PRAVASTATIN SODIUM 40 MG: 40 TABLET ORAL at 11:02

## 2021-06-12 RX ADMIN — SODIUM CHLORIDE, POTASSIUM CHLORIDE, SODIUM LACTATE AND CALCIUM CHLORIDE 100 ML/HR: 600; 310; 30; 20 INJECTION, SOLUTION INTRAVENOUS at 22:19

## 2021-06-12 RX ADMIN — SODIUM CHLORIDE, POTASSIUM CHLORIDE, SODIUM LACTATE AND CALCIUM CHLORIDE 9 ML/HR: 600; 310; 30; 20 INJECTION, SOLUTION INTRAVENOUS at 09:22

## 2021-06-12 RX ADMIN — PANTOPRAZOLE SODIUM 40 MG: 40 INJECTION, POWDER, FOR SOLUTION INTRAVENOUS at 20:29

## 2021-06-12 RX ADMIN — PANTOPRAZOLE SODIUM 40 MG: 40 INJECTION, POWDER, FOR SOLUTION INTRAVENOUS at 09:09

## 2021-06-12 RX ADMIN — SODIUM CHLORIDE, PRESERVATIVE FREE 10 ML: 5 INJECTION INTRAVENOUS at 09:21

## 2021-06-12 RX ADMIN — ONDANSETRON 4 MG: 2 INJECTION INTRAMUSCULAR; INTRAVENOUS at 09:51

## 2021-06-12 RX ADMIN — PROPOFOL 150 MG: 10 INJECTION, EMULSION INTRAVENOUS at 09:48

## 2021-06-12 RX ADMIN — SODIUM CHLORIDE, PRESERVATIVE FREE 10 ML: 5 INJECTION INTRAVENOUS at 08:57

## 2021-06-12 RX ADMIN — DEXAMETHASONE SODIUM PHOSPHATE 8 MG: 4 INJECTION, SOLUTION INTRA-ARTICULAR; INTRALESIONAL; INTRAMUSCULAR; INTRAVENOUS; SOFT TISSUE at 09:51

## 2021-06-12 RX ADMIN — LEVOTHYROXINE SODIUM 25 MCG: 25 TABLET ORAL at 06:20

## 2021-06-12 RX ADMIN — POTASSIUM CHLORIDE 40 MEQ: 750 CAPSULE, EXTENDED RELEASE ORAL at 03:01

## 2021-06-12 RX ADMIN — LIDOCAINE HYDROCHLORIDE 50 MG: 10 INJECTION, SOLUTION EPIDURAL; INFILTRATION; INTRACAUDAL; PERINEURAL at 09:48

## 2021-06-12 NOTE — CONSULTS
[facility] [place] [facility]    Referring Provider: Provider, No Known    PCP: Boaz Lorenzo MD    Reason for Consultation: GI bleed    Chief complaint: I threw up blood and passed out    History of present illness:    Kade Tillman is a 75 y.o. female who is admitted following a syncopal episode at a local restaurant.  Patient notes a 24-hour onset of symptoms noting that she went to the grocery store and struggled with weakness, fatigue, and feeling flushed initially thinking the store itself was too hot but that upon returning to home she continued to feel ill and rested for short period of time.  Upon waking, patient went to a local diner to obtain dinner for her family where she experienced a syncopal episode and collapsed on the floor.  Does not recall any attributing factors leading up to the episode but does note that she began to feel nauseous immediately thereafter.  Family brought patient to emergency department where she then began to experience hematemesis.  Patient does not endorse any melena or change in bowel habits over the past few months, no document shortness of breath, chest pain, fever/chills, or sick contacts.  Past medical, surgical, social, family history is reviewed for accuracy.  No documented history of gastric or bowel cancer in immediate family but does have a family history of cholangiocarcinoma in a brother.  Patient is not anticoagulated and uses 2-3 doses of Naprosyn per month.  No documented alleviating or exacerbating factors.  CT imaging obtained emergency department is significant for a posterior wall stomach body mass.  Laboratory findings indicative of anemia.  At time of exam, patient resting comfortably in bed no acute distress.  Denies pain.    Allergies:  Patient has no known allergies.    Scheduled Meds:  famotidine, 20 mg, Oral, Once  levothyroxine, 25 mcg, Oral, Q AM  pantoprazole, 40 mg, Intravenous, Q12H  pravastatin, 40 mg, Oral, Daily  sodium  chloride, 10 mL, Intravenous, Q12H  sodium chloride, 10 mL, Intravenous, Q12H         Infusions:  lactated ringers, 100 mL/hr, Last Rate: 100 mL/hr (06/11/21 7610)  lactated ringers, 9 mL/hr, Last Rate: 9 mL/hr (06/12/21 0922)        PRN Meds:  lactated ringers  •  lidocaine PF 1%  •  melatonin  •  midazolam  •  ondansetron  •  ondansetron  •  potassium chloride **OR** potassium chloride **OR** potassium chloride  •  Sodium Chloride (PF)  •  sodium chloride  •  sodium chloride    Home Meds:  Medications Prior to Admission   Medication Sig Dispense Refill Last Dose   • Acetaminophen 500 MG coapsule Take 1 tablet by mouth as needed.      • aspirin 81 MG tablet Take 1 tablet by mouth Daily. 30 tablet 11    • B Complex Vitamins (VITAMIN B COMPLEX) tablet Take 1 tablet by mouth daily.      • Calcium 500-125 MG-UNIT tablet Take 1 tablet by mouth daily.      • Cholecalciferol (VITAMIN D) 2000 units capsule Take 2,000 Units by mouth Daily.      • cholecalciferol (VITAMIN D3) 1000 units tablet Take  by mouth Daily.      • fluticasone (FLONASE) 50 MCG/ACT nasal spray 1 spray into each nostril daily as needed.      • hydrochlorothiazide (HYDRODIURIL) 25 MG tablet Take 25 mg by mouth daily.      • levothyroxine (SYNTHROID, LEVOTHROID) 25 MCG tablet Take 25 mcg by mouth daily.      • lisinopril (PRINIVIL,ZESTRIL) 10 MG tablet Take 10 mg by mouth daily.   Restart today      • Loratadine 10 MG capsule Take 10 mg by mouth daily as needed.      • Potassium Gluconate 550 MG tablet Take 1 tablet by mouth daily.      • pravastatin (PRAVACHOL) 40 MG tablet Take 40 mg by mouth daily.      • vitamin B-12 (CYANOCOBALAMIN) 1000 MCG tablet Take 1 tablet by mouth daily.      • vitamin C (ASCORBIC ACID) 500 MG tablet Take 500 mg by mouth daily.        ROS: Review of Systems   Constitutional: Positive for activity change and fatigue. Negative for appetite change, chills, diaphoresis, fever and unexpected weight change.   HENT: Negative for sore  throat, trouble swallowing and voice change.    Eyes: Negative.    Respiratory: Negative for apnea, cough, choking, chest tightness, shortness of breath, wheezing and stridor.    Cardiovascular: Negative for chest pain, palpitations and leg swelling.   Gastrointestinal: Positive for nausea and vomiting. Negative for abdominal distention, abdominal pain, anal bleeding, blood in stool, constipation, diarrhea and rectal pain.   Endocrine: Negative.    Genitourinary: Negative.    Musculoskeletal: Negative.    Skin: Positive for pallor. Negative for color change, rash and wound.   Allergic/Immunologic: Negative.    Neurological: Positive for dizziness, syncope and light-headedness.   Hematological: Negative for adenopathy. Does not bruise/bleed easily.   Psychiatric/Behavioral: Negative.    All other systems reviewed and are negative.    PAST MED HX:  Past Medical History:   Diagnosis Date   • A-fib (CMS/[facility])    • Allergic rhinitis    • Disease of thyroid gland    • Hyperlipidemia    • Hypertension    • Hypothyroidism    • Joint pain     LEFT KNEE TORN MENISCUS   • PONV (postoperative nausea and vomiting)    • Postmenopausal    • Torn meniscus      PAST SURG HX:  Past Surgical History:   Procedure Laterality Date   • APPENDECTOMY     • CARDIAC ELECTROPHYSIOLOGY PROCEDURE N/A 2016    Procedure: PVA;  Surgeon: Brian Chow MD;  Location: [facility] INVASIVE LOCATION;  Service:    • CATARACT EXTRACTION     •  SECTION     • COLONOSCOPY     • EYE SURGERY      BILATERAL CATARACTS REMOVED   • HYSTERECTOMY       FAM HX:  Family History   Problem Relation Age of Onset   • Heart attack Father    • Hypertension Brother    • Heart failure Brother    • Heart attack Brother      SOC HX:  Social History     Socioeconomic History   • Marital status:      Spouse name: Not on file   • Number of children: Not on file   • Years of education: Not on file   • Highest education level: Not on file   Tobacco Use   •  "Smoking status: Never Smoker   • Smokeless tobacco: Never Used   Substance and Sexual Activity   • Alcohol use: No   • Drug use: No   • Sexual activity: Defer     Comment: POSTMENOPAUSAL     PHYSICAL EXAM  /73 (BP Location: Left arm, Patient Position: Lying)   Pulse 68   Temp 98.3 °F (36.8 °C) (Temporal)   Resp 16   Ht 152.4 cm (60\")   Wt 61.2 kg (135 lb)   SpO2 97%   BMI 26.37 kg/m²   Wt Readings from Last 3 Encounters:   06/11/21 61.2 kg (135 lb)   05/31/18 61.2 kg (135 lb)   05/25/17 59.9 kg (132 lb)   ,body mass index is 26.37 kg/m².  Physical Exam  Vitals and nursing note reviewed.   Constitutional:       General: She is not in acute distress.     Appearance: Normal appearance. She is normal weight. She is not ill-appearing or toxic-appearing.   HENT:      Head: Normocephalic and atraumatic.      Mouth/Throat:      Mouth: Mucous membranes are moist.      Pharynx: Oropharynx is clear. No oropharyngeal exudate.   Eyes:      General: No scleral icterus.     Extraocular Movements: Extraocular movements intact.      Conjunctiva/sclera: Conjunctivae normal.      Pupils: Pupils are equal, round, and reactive to light.   Cardiovascular:      Rate and Rhythm: Normal rate and regular rhythm.      Pulses: Normal pulses.      Heart sounds: Murmur heard.   No friction rub. No gallop.    Pulmonary:      Effort: Pulmonary effort is normal. No respiratory distress.      Breath sounds: Normal breath sounds.   Abdominal:      General: Abdomen is flat. Bowel sounds are normal. There is no distension.      Palpations: Abdomen is soft. There is no mass.      Tenderness: There is no abdominal tenderness. There is no guarding or rebound.      Hernia: No hernia is present.   Genitourinary:     Comments: Defer  Musculoskeletal:         General: Normal range of motion.      Cervical back: Normal range of motion and neck supple. No rigidity or tenderness.      Right lower leg: No edema.      Left lower leg: No edema. "   Lymphadenopathy:      Cervical: No cervical adenopathy.   Skin:     General: Skin is warm and dry.      Capillary Refill: Capillary refill takes less than 2 seconds.      Coloration: Skin is pale. Skin is not jaundiced.      Comments: Small birthmark appreciated on patient's chin   Neurological:      General: No focal deficit present.      Mental Status: She is alert and oriented to person, place, and time.   Psychiatric:         Mood and Affect: Mood normal.         Behavior: Behavior normal.         Thought Content: Thought content normal.         Judgment: Judgment normal.         Results Review:   I reviewed the patient's new clinical results.  I reviewed the patient's new imaging results and agree with the interpretation.  I personally viewed and interpreted the patient's EKG/Telemetry data    Lab Results   Component Value Date    WBC 10.43 06/12/2021    HGB 9.0 (L) 06/12/2021    HGB 9.0 (L) 06/12/2021    HGB 9.0 (L) 06/11/2021    HCT 28.2 (L) 06/12/2021    HCT 28.2 (L) 06/12/2021    MCV 92.5 06/12/2021     06/12/2021       Lab Results   Component Value Date    INR 0.98 07/12/2016       Lab Results   Component Value Date    GLUCOSE 103 (H) 06/12/2021    BUN 33 (H) 06/12/2021    CREATININE 0.43 (L) 06/12/2021    EGFRIFNONA 143 06/12/2021    BCR 76.7 (H) 06/12/2021     06/12/2021    K 3.6 06/12/2021    CO2 26.0 06/12/2021    CALCIUM 8.8 06/12/2021    ALBUMIN 3.90 06/11/2021    ALKPHOS 66 06/11/2021    BILITOT 0.2 06/11/2021    ALT 12 06/11/2021    AST 17 06/11/2021     CT Abdomen Pelvis With Contrast  Result Date: 6/11/2021  CT Abdomen Pelvis W INDICATION: GI bleeding, syncope and collapse TECHNIQUE: CT of the abdomen and pelvis with IV contrast. Coronal and sagittal reconstructions were obtained.  Radiation dose reduction techniques included automated exposure control or exposure modulation based on body size. Count of known CT and cardiac nuc med studies performed in previous 12 months: 0.  COMPARISON: None available. FINDINGS: Abdomen: Lung bases are clear. There are gallstones. There is a large mass along the posterior body of the wall of the stomach concerning for malignancy. This measures about 4.4 x 4.3 cm. (key images) The kidneys appear unremarkable. Spleen and pancreas appear unremarkable. Pelvis: Bowel appears unremarkable. Pelvic structures appear within normal limits. No acute osseous abnormality.     There is large a mass along the posterior wall of the body of the stomach concerning for malignancy. Signer Name: Tamra Sandra MD  Signed: 6/11/2021 10:40 PM  Workstation Name: [id number]  [facility]    XR Chest 1 View  Result Date: 6/11/2021  CR Chest 1 Vw INDICATION: Difficulty breathing and nausea COMPARISON:  None available. FINDINGS: Single portable AP view(s) of the chest. The heart and mediastinal contours are normal. The lungs are clear. No pneumothorax or pleural effusion.     No acute cardiopulmonary findings. Signer Name: Tamra Sandra MD  Signed: 6/11/2021 10:44 PM  Workstation Name: [id number]  [facility]    COVID19   Date Value Ref Range Status   06/11/2021 Not Detected Not Detected - Ref. Range Final     ASSESSMENTS/PLANS  1.  Gastric mass  2.  Hematemesis, related to above  3.  PAF, status post ablation, no anticoagulation  4.  Essential hypertension    Kade Tillman is a 75 y.o. female admitted to hospital with acute onset of hematemesis.  CT imaging obtained in emergency department significant for a large posterior wall mass of stomach body.  Differentials would include an upper GI malignancy i.e. adenocarcinoma, lymphoma versus complex ulceration.        >>> Recommend EGD today to evaluate posterior stomach wall mass  >>> Continue n.p.o.  >>> Obtain informed consent for esophagogastroduodenoscopy with biopsy of mass  >>> We will start IV PPI twice daily; transition to p.o. post procedure.  >>> Continue to trend H&H and  transfuse for hemoglobins less than 7 or symptomatic anemia per protocol.  >>> We will attempt to obtain adequate sampling via EGD however, as noted by image sampling above, mass appears walled off within internal structure-will likely require outpatient EUS for definitive sampling.    I discussed the patient's findings and my recommendations with patient, family and nursing staff    FRANCISCA Mercado  06/12/21  10:11 EDT

## 2021-06-12 NOTE — ED PROVIDER NOTES
[facility]    EMERGENCY DEPARTMENT ENCOUNTER      Pt Name: Kade Tillman  MRN: [id number]  YOB: 1945  Date of evaluation: 6/11/2021  Provider: Franklin Eugene MD    CHIEF COMPLAINT       Chief Complaint   Patient presents with   • Vomiting         HISTORY OF PRESENT ILLNESS  (Location/Symptom, Timing/Onset, Context/Setting, Quality, Duration, Modifying Factors, Severity.)   Kade Tillman is a 75 y.o. female who presents to the emergency department with syncope and hematemesis.  Patient states that she has generally felt unwell all day today and states that the symptoms have been progressively worsening and are now moderate to severe and primarily exacerbated with epps movements or standing up from a seated position.  She did have 1 syncopal episode upon standing from a seated position prior to coming to the emergency department but denies any preceding headache, chest pain, shortness of breath, abdominal pain, back pain, or injury associated with the fall.  She had an additional syncopal episode in our lobby along with large-volume hematemesis.  She denies any prior history of GI bleeding, liver disease, alcohol abuse, NSAID abuse, or history of AAA and this was her first episode of hematemesis.  She denies any bright red blood per rectum or melena over the past couple of days as well.      Nursing notes were reviewed.    REVIEW OF SYSTEMS    (2-9 systems for level 4, 10 or more for level 5)   ROS:  General: Weakness  Cardiovascular:  No chest pain, no palpitations  Respiratory:  No shortness of breath, no cough, no wheezing  Gastrointestinal: Hematemesis  Musculoskeletal:  No muscle pain, no joint pain  Skin:  No rash  Neurologic:  No speech problems, no headache, no extremity numbness, no extremity tingling, no extremity weakness  Psychiatric:  No anxiety  Genitourinary:  No dysuria, no hematuria    Except as noted above the remainder of the review of systems was reviewed and negative.        PAST MEDICAL HISTORY     Past Medical History:   Diagnosis Date   • A-fib (CMS/HCC)    • Allergic rhinitis    • Disease of thyroid gland    • Hyperlipidemia    • Hypertension    • Hypothyroidism    • Joint pain     LEFT KNEE TORN MENISCUS   • PONV (postoperative nausea and vomiting)    • Postmenopausal    • Torn meniscus          SURGICAL HISTORY       Past Surgical History:   Procedure Laterality Date   • APPENDECTOMY     • CARDIAC ELECTROPHYSIOLOGY PROCEDURE N/A 2016    Procedure: PVA;  Surgeon: Brian Chow MD;  Location: [facility] INVASIVE LOCATION;  Service:    • CATARACT EXTRACTION     •  SECTION     • COLONOSCOPY     • EYE SURGERY      BILATERAL CATARACTS REMOVED   • HYSTERECTOMY           CURRENT MEDICATIONS       Current Facility-Administered Medications:   •  lactated ringers infusion, 100 mL/hr, Intravenous, Continuous, Vitor Valencia PA-SANDIP, Last Rate: 100 mL/hr at 21 2353, 100 mL/hr at 21 2353  •  levothyroxine (SYNTHROID, LEVOTHROID) tablet 25 mcg, 25 mcg, Oral, Q AM, Vitor Valencia PA-C, 25 mcg at 21 0620  •  melatonin tablet 5 mg, 5 mg, Oral, Nightly PRN, Vitor Valencia PA-C  •  ondansetron (ZOFRAN) injection 4 mg, 4 mg, Intravenous, Q6H PRN, Vitor Valencia PA-C  •  pantoprazole (PROTONIX) injection 40 mg, 40 mg, Intravenous, Q12H, Vitor Valencia PA-C  •  potassium chloride (MICRO-K) CR capsule 40 mEq, 40 mEq, Oral, PRN, 40 mEq at 21 0301 **OR** potassium chloride (KLOR-CON) packet 40 mEq, 40 mEq, Oral, PRN **OR** potassium chloride 10 mEq in 100 mL IVPB, 10 mEq, Intravenous, Q1H PRN, Vitor Valencia PA-C  •  pravastatin (PRAVACHOL) tablet 40 mg, 40 mg, Oral, Daily, Vitor Valencia PA-C  •  Sodium Chloride (PF) 0.9 % 10 mL, 10 mL, Intravenous, PRN, Franklin Eugene MD  •  sodium chloride 0.9 % flush 10 mL, 10 mL, Intravenous, Q12H, Vitor Valencia PA-C  •  sodium chloride 0.9 % flush 10 mL, 10 mL, Intravenous, PRN, Erik,  Vitor ANDREW PA-C    ALLERGIES     Patient has no known allergies.    FAMILY HISTORY       Family History   Problem Relation Age of Onset   • Heart attack Father    • Hypertension Brother    • Heart failure Brother    • Heart attack Brother           SOCIAL HISTORY       Social History     Socioeconomic History   • Marital status:      Spouse name: Not on file   • Number of children: Not on file   • Years of education: Not on file   • Highest education level: Not on file   Tobacco Use   • Smoking status: Never Smoker   • Smokeless tobacco: Never Used   Substance and Sexual Activity   • Alcohol use: No   • Drug use: No   • Sexual activity: Defer     Comment: POSTMENOPAUSAL         PHYSICAL EXAM    (up to 7 for level 4, 8 or more for level 5)     Vitals:    06/11/21 2100 06/11/21 2145 06/11/21 2337 06/12/21 0245   BP: 106/63 127/62 134/60 118/62   BP Location:   Left arm Left arm   Patient Position:   Lying Lying   Pulse: 64  66 63   Resp:   18 16   Temp:    97.8 °F (36.6 °C)   TempSrc:    Oral   SpO2: 92% 94%     Weight:       Height:           Physical Exam  General: Awake, alert, no acute distress.  HEENT: Conjunctiva normal.  Neck: Trachea midline.  Cardiac: Heart regular rate, rhythm, no murmurs, rubs, or gallops  Lungs: Lungs are clear to auscultation, there is no wheezing, rhonchi, or rales. There is no use of accessory muscles.  Chest wall: There is no tenderness to palpation over the chest wall or over ribs  Abdomen: Abdomen is soft, nontender, nondistended. There are no firm or pulsatile masses, no rebound rigidity or guarding.   Musculoskeletal: No deformity.  Neuro: Alert and oriented x 4.  Dermatology: Skin is warm and dry  Psych: Mentation is grossly normal, cognition is grossly normal. Affect is appropriate.        DIAGNOSTIC RESULTS     EKG: All EKG's are interpreted by the Emergency Department Physician who either signs or Co-signs this chart in the absence of a cardiologist.    Sinus rhythm  rate of 66, no acute ST segment or T wave change, normal intervals, no ectopy    RADIOLOGY:   Non-plain film images such as CT, Ultrasound and MRI are read by the radiologist. Plain radiographic images are visualized and preliminarily interpreted by the emergency physician with the below findings:      [x] Radiologist's Report Reviewed:  XR Chest 1 View   Final Result   No acute cardiopulmonary findings.      Signer Name: Tamra Sandra MD    Signed: 6/11/2021 10:44 PM    Workstation Name: [id number]     [facility]      CT Abdomen Pelvis With Contrast   Final Result   There is large a mass along the posterior wall of the body of the stomach concerning for malignancy.               Signer Name: Tamra Sandra MD    Signed: 6/11/2021 10:40 PM    Workstation Name: [id number]     [facility]          LABS:    I have reviewed and interpreted all of the currently available lab results from this visit (if applicable):  Results for orders placed or performed during the hospital encounter of 06/11/21   COVID-19 and FLU A/B PCR - Swab, Nasopharynx    Specimen: Nasopharynx; Swab   Result Value Ref Range    COVID19 Not Detected Not Detected - Ref. Range    Influenza A PCR Not Detected Not Detected    Influenza B PCR Not Detected Not Detected   Comprehensive Metabolic Panel    Specimen: Blood   Result Value Ref Range    Glucose 129 (H) 65 - 99 mg/dL    BUN 51 (H) 8 - 23 mg/dL    Creatinine 0.60 0.57 - 1.00 mg/dL    Sodium 135 (L) 136 - 145 mmol/L    Potassium 3.2 (L) 3.5 - 5.2 mmol/L    Chloride 97 (L) 98 - 107 mmol/L    CO2 27.0 22.0 - 29.0 mmol/L    Calcium 10.3 8.6 - 10.5 mg/dL    Total Protein 6.3 6.0 - 8.5 g/dL    Albumin 3.90 3.50 - 5.20 g/dL    ALT (SGPT) 12 1 - 33 U/L    AST (SGOT) 17 1 - 32 U/L    Alkaline Phosphatase 66 39 - 117 U/L    Total Bilirubin 0.2 0.0 - 1.2 mg/dL    eGFR Non African Amer 97 >60 mL/min/1.73    Globulin 2.4 gm/dL    A/G Ratio 1.6 g/dL    BUN/Creatinine  Ratio 85.0 (H) 7.0 - 25.0    Anion Gap 11.0 5.0 - 15.0 mmol/L   Lipase    Specimen: Blood   Result Value Ref Range    Lipase 28 13 - 60 U/L   Lactic Acid, Plasma    Specimen: Blood   Result Value Ref Range    Lactate 1.6 0.5 - 2.0 mmol/L   CBC Auto Differential    Specimen: Blood   Result Value Ref Range    WBC 17.20 (H) 3.40 - 10.80 10*3/mm3    RBC 3.70 (L) 3.77 - 5.28 10*6/mm3    Hemoglobin 10.9 (L) 12.0 - 15.9 g/dL    Hematocrit 33.6 (L) 34.0 - 46.6 %    MCV 90.8 79.0 - 97.0 fL    MCH 29.5 26.6 - 33.0 pg    MCHC 32.4 31.5 - 35.7 g/dL    RDW 13.5 12.3 - 15.4 %    RDW-SD 44.9 37.0 - 54.0 fl    MPV 10.5 6.0 - 12.0 fL    Platelets 224 140 - 450 10*3/mm3    Neutrophil % 71.3 42.7 - 76.0 %    Lymphocyte % 21.0 19.6 - 45.3 %    Monocyte % 6.6 5.0 - 12.0 %    Eosinophil % 0.2 (L) 0.3 - 6.2 %    Basophil % 0.4 0.0 - 1.5 %    Immature Grans % 0.5 0.0 - 0.5 %    Neutrophils, Absolute 12.25 (H) 1.70 - 7.00 10*3/mm3    Lymphocytes, Absolute 3.62 (H) 0.70 - 3.10 10*3/mm3    Monocytes, Absolute 1.14 (H) 0.10 - 0.90 10*3/mm3    Eosinophils, Absolute 0.04 0.00 - 0.40 10*3/mm3    Basophils, Absolute 0.07 0.00 - 0.20 10*3/mm3    Immature Grans, Absolute 0.08 (H) 0.00 - 0.05 10*3/mm3    nRBC 0.0 0.0 - 0.2 /100 WBC   Procalcitonin    Specimen: Blood   Result Value Ref Range    Procalcitonin 0.04 0.00 - 0.25 ng/mL   Urinalysis With Culture If Indicated - Urine, Random Void    Specimen: Urine, Random Void   Result Value Ref Range    Color, UA Yellow Yellow, Straw    Appearance, UA Clear Clear    pH, UA 5.5 5.0 - 8.0    Specific Gravity, UA 1.027 1.001 - 1.030    Glucose, UA Negative Negative    Ketones, UA Negative Negative    Bilirubin, UA Negative Negative    Blood, UA Negative Negative    Protein, UA Negative Negative    Leuk Esterase, UA Small (1+) (A) Negative    Nitrite, UA Negative Negative    Urobilinogen, UA 0.2 E.U./dL 0.2 - 1.0 E.U./dL   Magnesium    Specimen: Blood   Result Value Ref Range    Magnesium 2.1 1.6 - 2.4 mg/dL    CBC Auto Differential    Specimen: Blood   Result Value Ref Range    WBC 11.49 (H) 3.40 - 10.80 10*3/mm3    RBC 3.11 (L) 3.77 - 5.28 10*6/mm3    Hemoglobin 9.0 (L) 12.0 - 15.9 g/dL    Hematocrit 28.1 (L) 34.0 - 46.6 %    MCV 90.4 79.0 - 97.0 fL    MCH 28.9 26.6 - 33.0 pg    MCHC 32.0 31.5 - 35.7 g/dL    RDW 13.4 12.3 - 15.4 %    RDW-SD 44.1 37.0 - 54.0 fl    MPV 10.7 6.0 - 12.0 fL    Platelets 191 140 - 450 10*3/mm3    Neutrophil % 75.3 42.7 - 76.0 %    Lymphocyte % 19.8 19.6 - 45.3 %    Monocyte % 4.2 (L) 5.0 - 12.0 %    Eosinophil % 0.1 (L) 0.3 - 6.2 %    Basophil % 0.3 0.0 - 1.5 %    Immature Grans % 0.3 0.0 - 0.5 %    Neutrophils, Absolute 8.66 (H) 1.70 - 7.00 10*3/mm3    Lymphocytes, Absolute 2.27 0.70 - 3.10 10*3/mm3    Monocytes, Absolute 0.48 0.10 - 0.90 10*3/mm3    Eosinophils, Absolute 0.01 0.00 - 0.40 10*3/mm3    Basophils, Absolute 0.03 0.00 - 0.20 10*3/mm3    Immature Grans, Absolute 0.04 0.00 - 0.05 10*3/mm3    nRBC 0.0 0.0 - 0.2 /100 WBC   Urinalysis, Microscopic Only - Urine, Random Void    Specimen: Urine, Random Void   Result Value Ref Range    RBC, UA None Seen None Seen, 0-2 /HPF    WBC, UA None Seen None Seen, 0-2 /HPF    Bacteria, UA Trace None Seen, Trace /HPF    Squamous Epithelial Cells, UA      Methodology Automated Microscopy    ECG 12 Lead   Result Value Ref Range    QT Interval 386 ms    QTC Interval 404 ms   Type & Screen    Specimen: Blood   Result Value Ref Range    ABO Type O     RH type Positive     Antibody Screen Negative     T&S Expiration Date 6/14/2021 11:59:59 PM    ABO RH Specimen Verification    Specimen: Blood   Result Value Ref Range    ABO Type O     RH type Positive    Prepare RBC, 1 Units   Result Value Ref Range    Product Code D6104O71     Unit Number [id number]     UNIT  ABO O     UNIT  RH POS     Crossmatch Interpretation Compatible     Dispense Status XM     Blood Expiration Date [id number]     Blood Type Barcode 5100    Green Top (Gel)   Result Value Ref  Range    Extra Tube Hold for add-ons.    Lavender Top   Result Value Ref Range    Extra Tube hold for add-on    Gold Top - SST   Result Value Ref Range    Extra Tube Hold for add-ons.    Gray Top   Result Value Ref Range    Extra Tube Hold for add-ons.         All other labs were within normal range or not returned as of this dictation.      EMERGENCY DEPARTMENT COURSE and DIFFERENTIAL DIAGNOSIS/MDM:   Vitals:    Vitals:    06/11/21 2100 06/11/21 2145 06/11/21 2337 06/12/21 0245   BP: 106/63 127/62 134/60 118/62   BP Location:   Left arm Left arm   Patient Position:   Lying Lying   Pulse: 64  66 63   Resp:   18 16   Temp:    97.8 °F (36.6 °C)   TempSrc:    Oral   SpO2: 92% 94%     Weight:       Height:           ED Course as of Jun 12 0726 Fri Jun 11, 2021 2153 Spoke w/ Dr. Santiago who accepts the pt for admission.    [NS]   2154 Pt remains HD stable on re-evaluation.    [NS]      ED Course User Index  [NS] Franklin Eugene MD       Patient stable throughout the duration of her stay in the emergency department.  There is no tachycardia or hypotension.  She does have some mild anemia on initial hemoglobin evaluation.  Given her large volume hematemesis in our lobby, strong concern that GI bleeding may be the cause of the symptoms and her syncopal episodes.  There is nothing on history or exam that would suggest acute neurovascular or cardiovascular cause apart from volume depletion.  Her ECG demonstrates no evidence of syncope syndrome, ACS, or dysrhythmia and there is no evidence of myocardial injury or significant electrolyte derangement based on laboratory evaluation.  Patient was given IV fluids and Protonix in the emergency department and will be admitted to the hospital service for further evaluation.      MEDICATIONS ADMINISTERED IN ED:  Medications   Sodium Chloride (PF) 0.9 % 10 mL (has no administration in time range)   pravastatin (PRAVACHOL) tablet 40 mg (has no administration in time range)    levothyroxine (SYNTHROID, LEVOTHROID) tablet 25 mcg (25 mcg Oral Given 6/12/21 0620)   sodium chloride 0.9 % flush 10 mL (10 mL Intravenous Not Given 6/12/21 0130)   sodium chloride 0.9 % flush 10 mL (has no administration in time range)   lactated ringers infusion (100 mL/hr Intravenous New Bag 6/11/21 2353)   melatonin tablet 5 mg (has no administration in time range)   ondansetron (ZOFRAN) injection 4 mg (has no administration in time range)   potassium chloride (MICRO-K) CR capsule 40 mEq (40 mEq Oral Given 6/12/21 0301)     Or   potassium chloride (KLOR-CON) packet 40 mEq ( Oral Not Given:  See Alt 6/12/21 0301)     Or   potassium chloride 10 mEq in 100 mL IVPB ( Intravenous Not Given:  See Alt 6/12/21 0301)   pantoprazole (PROTONIX) injection 40 mg (has no administration in time range)   sodium chloride 0.9 % bolus 1,000 mL (0 mL Intravenous Stopped 6/11/21 2255)   pantoprazole (PROTONIX) injection 80 mg (80 mg Intravenous Given 6/11/21 2124)   iopamidol (ISOVUE-300) 61 % injection 100 mL (95 mL Intravenous Given 6/11/21 2222)         FINAL IMPRESSION      1. Acute upper GI bleed    2. Syncope, unspecified syncope type    3. Hypokalemia    4. Hematemesis with nausea    5. Generalized weakness          DISPOSITION/PLAN     ED Disposition     ED Disposition Condition Comment    Decision to Admit  Level of Care: Telemetry [5]   Diagnosis: Acute upper GI bleed [173286]   Admitting Physician: DOROTA MYERS [[id number]]   Attending Physician: DOROTA MYERS [[id number]]   Certification: I Certify That Inpatient Hospital Services Are Medically Necessary For Greater Than 2 Midnights              Franklin Eugene MD  Attending Emergency Physician               Franklin Eugene MD  06/12/21 3359

## 2021-06-12 NOTE — H&P
[facility] Medicine Services  HISTORY AND PHYSICAL    Patient Name: Kade Tillman  : 1945  MRN: [id number]  Primary Care Physician: Boaz Lorenzo MD  Date of admission: 2021    Subjective   Subjective     Chief Complaint:  syncope    HPI:  Kade Tillman is a 75 y.o. female with a past medical history significant for hypertension, HLD, PAF, hypothyroidism, and osteoarthritis. She presents today s/p syncopal episode with associated hematemesis. Patient states she was waiting in line at a restaurant today with she suddenly felt dizzy, naseated and passed out. States she came to and was brought to ED by daughter. While in waiting room, patient had two episodes of hematemesis followed by another syncopal episode. Now reports feeling dizzy and fatigued. Currently there are no complaints of fever, cough, congestion, SOB, or chest pain. No headache or focal weakness/parathesias. No abdominal pain or diarrhea/constipation. No melena or hematochezia. Patient takes low dose ASA daily but denies NSAID or alcohol use. No history of GI bleed. Her last colonoscopy was 5 years ago. Reportedly normal. She is fully vaccinated for COVID-19. Will admit to inpatient for further evaluation and treatment.      COVID Details:    Symptoms:    [x] NONE [] Fever []  Cough [] Shortness of breath [] Change in taste/smell      The patient qualifies to receive the vaccine, but they have not yet received it.      Review of Systems   Constitutional: Negative for chills and fever.   HENT: Negative for congestion and trouble swallowing.    Eyes: Negative for photophobia and visual disturbance.   Respiratory: Negative for cough and shortness of breath.    Cardiovascular: Negative for chest pain and leg swelling.   Gastrointestinal: Positive for nausea and vomiting. Negative for abdominal pain and diarrhea.   Endocrine: Negative for cold intolerance and heat intolerance.   Genitourinary:  Negative for dysuria and flank pain.   Musculoskeletal: Negative for back pain and gait problem.   Skin: Negative for pallor and rash.   Allergic/Immunologic: Negative for immunocompromised state.   Neurological: Positive for dizziness and syncope. Negative for facial asymmetry, speech difficulty and headaches.   Hematological: Negative for adenopathy.   Psychiatric/Behavioral: Negative for agitation and confusion.        All other systems reviewed and are negative.     Personal History     Past Medical History:   Diagnosis Date   • A-fib (CMS/HCC)    • Allergic rhinitis    • Disease of thyroid gland    • Hyperlipidemia    • Hypertension    • Hypothyroidism    • Joint pain     LEFT KNEE TORN MENISCUS   • PONV (postoperative nausea and vomiting)    • Postmenopausal    • Torn meniscus        Past Surgical History:   Procedure Laterality Date   • APPENDECTOMY     • CARDIAC ELECTROPHYSIOLOGY PROCEDURE N/A 2016    Procedure: PVA;  Surgeon: Brian Chow MD;  Location: [facility] INVASIVE LOCATION;  Service:    • CATARACT EXTRACTION     •  SECTION     • COLONOSCOPY     • EYE SURGERY      BILATERAL CATARACTS REMOVED   • HYSTERECTOMY         Family History: family history includes Heart attack in her brother and father; Heart failure in her brother; Hypertension in her brother. Otherwise pertinent FHx was reviewed and unremarkable.     Social History:  reports that she has never smoked. She has never used smokeless tobacco. She reports that she does not drink alcohol and does not use drugs.  Social History     Social History Narrative    Pt consumes 2 or 3 servings of caffeine per week.        Medications:  Acetaminophen, Calcium, Loratadine, Potassium Gluconate, Vitamin B Complex, Vitamin D, aspirin, cholecalciferol, fluticasone, hydroCHLOROthiazide, levothyroxine, lisinopril, pravastatin, vitamin B-12, and vitamin C    No Known Allergies    Objective   Objective     Vital Signs:   Temp:  [97.9 °F (36.6  °C)] 97.9 °F (36.6 °C)  Heart Rate:  [63-65] 64  Resp:  [18] 18  BP: (106-127)/(62-67) 127/62    Physical Exam   Constitutional: Awake, alert  Eyes: PERRLA, sclerae anicteric, no conjunctival injection  HENT: NCAT, mucous membranes moist  Neck: Supple, no thyromegaly, no lymphadenopathy, trachea midline  Respiratory: Clear to auscultation bilaterally, nonlabored respirations   Cardiovascular: RRR, no murmurs, rubs, or gallops, palpable pedal pulses bilaterally  Gastrointestinal: Positive bowel sounds, soft, nontender, nondistended  Musculoskeletal: No bilateral ankle edema, no clubbing or cyanosis to extremities  Psychiatric: Appropriate affect, cooperative  Neurologic: Oriented x 3, strength symmetric in all extremities, Cranial Nerves grossly intact to confrontation, speech clear  Skin: No rashes      Results Reviewed:  I have personally reviewed most recent indicated data and agree with findings including:  [x]  Laboratory  [x]  Radiology  []  EKG/Telemetry  []  Pathology  []  Cardiac/Vascular Studies  []  Old records  []  Other:  Most pertinent findings include: vitals stable. Sodium 135. Potassium 3.2. WBC 17.2. H/H 10.9 and 33.6 respectively.  CXR clear. CT A/P pending      LAB RESULTS:      Lab 06/11/21 2034   WBC 17.20*   HEMOGLOBIN 10.9*   HEMATOCRIT 33.6*   PLATELETS 224   NEUTROS ABS 12.25*   IMMATURE GRANS (ABS) 0.08*   LYMPHS ABS 3.62*   MONOS ABS 1.14*   EOS ABS 0.04   MCV 90.8   LACTATE 1.6         Lab 06/11/21 2034   SODIUM 135*   POTASSIUM 3.2*   CHLORIDE 97*   CO2 27.0   ANION GAP 11.0   BUN 51*   CREATININE 0.60   GLUCOSE 129*   CALCIUM 10.3         Lab 06/11/21 2034   TOTAL PROTEIN 6.3   ALBUMIN 3.90   GLOBULIN 2.4   ALT (SGPT) 12   AST (SGOT) 17   BILIRUBIN 0.2   ALK PHOS 66   LIPASE 28                 Lab 06/11/21 2109   ABO TYPING O   RH TYPING Positive   ANTIBODY SCREEN Negative         Brief Urine Lab Results     None        Microbiology Results (last 10 days)     ** No results found for  the last 240 hours. **          No radiology results from the last 24 hrs        Assessment/Plan   Assessment & Plan       Acute upper GI bleed    Hypokalemia    Leukocytosis    Essential hypertension    Paroxysmal atrial fibrillation (CMS/HCC)    Congenital hypothyroidism without goiter    Hyperlipidemia      1. Acute Upper GI Bleed:  - with hematemesis. Hemoglobin 10.9. will continue to trend and transfuse as needed  - hold ASA  - consult to GI  - if N/V persists will consider NG placement  - scheduled IV PPI  - NPO  - IVF  - CT imaging back and notes posterior gastric mass concerning for malignancy  - am labs    2. Hypokalemia:  - obtain EKG  - check magnesium  - replace as needed per protocol    3. Leukocytosis:  - afebrile. Check procalcitonin, lactic acid  - check UA, CXR    4. Hypertension:  - BP labile. Currently stable  - in the setting of syncope will hold antihypertensives for now    5. PAF:  - secondary to anesthesia. Followed by Dr. White  - stable and rate controlled. S/p pulmonary vein ablation by Dr. Chow with no recurrences. Was taking ASA only    6. Hypothyroid:  - continue synthroid    7. HLD:  - continue statin    DVT prophylaxis: mechanical    CODE STATUS:  Full code  Code Status and Medical Interventions:   Ordered at: 06/11/21 5135     Level Of Support Discussed With:    Patient     Code Status:    CPR     Medical Interventions (Level of Support Prior to Arrest):    Full       This note has been completed as part of a split-shared workflow.     Electronically signed by Vitor Valencia PA-C, 06/11/21, 11:10 PM EDT.      Brief Attending Admission Attestation     I have seen and examined the patient, performing an independent face-to-face diagnostic evaluation with plan of care reviewed and developed with the advanced practice clinician (APC).    Old records reviewed and summarized in PM hx.    Brief Summary Statement:  75-year-old female presented to ED secondary-syncope event.  Around 1  PM patient started feeling diaphoretic heart came home had a nap, woke up around 5:30 PM, went to restaurant to get some food, started feeling lightheaded while she was standing, no focal neurological symptoms, sat down, and when she got up she had a syncope event which patient does not recall.  Was brought in to ED by EMS-at triage patient had episode of vomiting-look like hematemesis, and again had syncope event.  Patient denied any complaint of abdominal pain, no increased reflux symptoms, uses aspirin daily but no other NSAIDs use reported.  Does have chronic constipation takes as needed MiraLAX, with last BM yesterday.  Do not complain of any fever or chills or diarrhea.  Last colonoscopy 5 years back, never has history of peptic ulcer disease never had a EGD done.  No complaint of chest pain, exertional dyspnea, palpitations.  Denies any weight loss or change in appetite  In ER patient was started on Protonix drip, normal saline 1 L  Remainder of detailed HPI is as noted above and has been reviewed and/or edited by me for completeness.    PM HX:  CAD-aspirin 81 mg  Hypertension-HCTZ 25 mg, lisinopril 10 mg  Hypothyroid-Synthroid 25 mcg  Hyperlipidemia-Zocor 40 mg daily  Paroxysmal A. fib-s/p PVA, no recurrent A. fib since then.    Vitals:    06/11/21 2145   BP: 127/62   Pulse:  64   Resp:  18-94%   Temp:  106/63-1 27/62   SpO2: 94%       Attending Physical Exam:  RS- CTA-BL, ,  No wheezing , no crackles, good effort.  CVS- s1s2 regular, no murmur.  ABD- soft, non tender, not distended, no organomegaly.  EXT- no edema.  NEURO- AAO-3, no focal defecit.      LABS:  Glucose-129, sodium 135, potassium three-point  BUN/creatinine 15/0.6  Albumin 3.9  Lactic acid 1.6  Lipase 28  WBC 17  Hemoglobin 11, hematocrit 33, platelets 224, neutrophils 71  EKG sinus rhythm 66 bpm, low voltage,   Covid test negative  CT abdomen pelvis-with contrast-shows large mass along the posterior wall of body of the stomach  concerning for malignancy.    Brief Assessment/Plan  Likely upper GI bleed-though patient gives no history of gastritis or reflux problem-started on PPI will continue, if symptom recurs, will need NG tube.  -Close monitoring of the CBC.  -N.p.o. and GI consult in a.m. for possible endoscopy.  -Replace electrolytes  -Leukocytosis-chest x-ray no acute abnormality, no dysuria symptoms, will send UA.  CT abdomen results back-showing a large mass in the posterior stomach wall.  Telemetry monitor-given history of arrhythmia in the past.    See above for further detailed assessment and plan developed with APC which I have reviewed and/or edited for completeness.        Admission Status: I believe that this patient meets inpatient criteria, secondary to acute GI bleed.

## 2021-06-12 NOTE — BRIEF OP NOTE
ESOPHAGOGASTRODUODENOSCOPY  Progress Note    Kade Tillman  6/12/2021    Pre-op Diagnosis:   gastric mass on CT       Post-Op Diagnosis Codes:   Mariann Worthington Tear   Submucosal lesion with ulceration s/p biopsy   Small hiatal hernia    Procedure/CPT® Codes:        Procedure(s):  ESOPHAGOGASTRODUODENOSCOPY    Surgeon(s):  Rebecca Gómez MD    Anesthesia: Monitored Anesthesia Care    Staff:   Circulator: Monika Lindsey RN  Scrub Person: Violet Dutton  Assistant: Ervin Matthew APRN  Assistant: Ervin Matthew APRN      Estimated Blood Loss: minimal    Urine Voided: * No values recorded between 6/12/2021  9:42 AM and 6/12/2021 10:10 AM *    Specimens:                Specimens     ID Source Type Tests Collected By Collected At Frozen?    A Gastric, Antrum Tissue · TISSUE PATHOLOGY EXAM   Rebecca Gómez MD 6/12/21 1000     Description: biopsy of gastric mass    Comment: biopsy of gastric mass    This specimen was not marked as sent.                Drains: * No LDAs found *    Findings:   1.  Submucosal mass with ulceration at fundus  2.  Hiatal hernia  3.  Mariann Worthington tear    Biopsies of lesion obtained;  Biopsies also sent for flow cytometry  Restart diet  Will plan for outpatient EUS    Complications: none    Assistant: Ervin Matthew APRN  was responsible for performing the following activities: none and their skilled assistance was necessary for the success of this case.    Rebecca Gómez MD     Date: 6/12/2021  Time: 10:15 EDT     ADDENDUM    Went back to room to discuss findings with patient and daughter.  They are aware that we may not have pathology and cytology back before discharge,.  Anticipate that patient will be ok for discharge tomorrow with outpatient GI follow up

## 2021-06-12 NOTE — PLAN OF CARE
Problem: Adult Inpatient Plan of Care  Goal: Plan of Care Review  Outcome: Ongoing, Progressing  Goal: Patient-Specific Goal (Individualized)  Outcome: Ongoing, Progressing  Goal: Absence of Hospital-Acquired Illness or Injury  Outcome: Ongoing, Progressing  Intervention: Identify and Manage Fall Risk  Recent Flowsheet Documentation  Taken 6/12/2021 0600 by Tuyet Castañeda, RN  Safety Promotion/Fall Prevention:   activity supervised   assistive device/personal items within reach   clutter free environment maintained   elopement precautions   fall prevention program maintained   gait belt   lighting adjusted   nonskid shoes/slippers when out of bed   room organization consistent   safety round/check completed  Taken 6/12/2021 0400 by Tuyet Castañeda RN  Safety Promotion/Fall Prevention:   activity supervised   assistive device/personal items within reach   clutter free environment maintained   fall prevention program maintained   lighting adjusted   nonskid shoes/slippers when out of bed   room organization consistent   safety round/check completed   gait belt   elopement precautions  Taken 6/12/2021 0200 by Tuyet Castañeda, RN  Safety Promotion/Fall Prevention:   activity supervised   assistive device/personal items within reach   clutter free environment maintained   fall prevention program maintained   lighting adjusted   elopement precautions   gait belt   room organization consistent   nonskid shoes/slippers when out of bed   safety round/check completed  Taken 6/11/2021 2359 by Tuyet Castañeda, RN  Safety Promotion/Fall Prevention:   activity supervised   assistive device/personal items within reach   clutter free environment maintained   fall prevention program maintained   lighting adjusted   elopement precautions   gait belt   nonskid shoes/slippers when out of bed   room organization consistent   safety round/check completed  Intervention: Prevent Skin Injury  Recent Flowsheet Documentation  Taken  6/12/2021 0600 by Tuyet Castañeda RN  Body Position: position changed independently  Taken 6/12/2021 0400 by Tuyet Castañeda RN  Body Position: position changed independently  Skin Protection:   adhesive use limited   incontinence pads utilized   tubing/devices free from skin contact  Taken 6/12/2021 0200 by Tuyet Castañeda RN  Body Position: position changed independently  Skin Protection:   adhesive use limited   incontinence pads utilized   tubing/devices free from skin contact  Taken 6/11/2021 2359 by Tuyet Castañeda RN  Body Position: position changed independently  Intervention: Prevent and Manage VTE (venous thromboembolism) Risk  Recent Flowsheet Documentation  Taken 6/11/2021 2359 by Tuyet Castañeda RN  VTE Prevention/Management:   bilateral   sequential compression devices on   bleeding risk factor(s) identified  Goal: Optimal Comfort and Wellbeing  Outcome: Ongoing, Progressing  Intervention: Provide Person-Centered Care  Recent Flowsheet Documentation  Taken 6/11/2021 2359 by Tuyet Castañeda RN  Trust Relationship/Rapport:   care explained   thoughts/feelings acknowledged  Goal: Readiness for Transition of Care  Outcome: Ongoing, Progressing  Intervention: Mutually Develop Transition Plan  Recent Flowsheet Documentation  Taken 6/11/2021 2343 by Tuyet Castañeda RN  Transportation Anticipated: family or friend will provide  Patient/Family Anticipated Services at Transition:   Patient/Family Anticipates Transition to: home with family  Taken 6/11/2021 2342 by Tuyet Castañeda RN  Equipment Currently Used at Home: none   Goal Outcome Evaluation:

## 2021-06-12 NOTE — ANESTHESIA PREPROCEDURE EVALUATION
Anesthesia Evaluation     Patient summary reviewed and Nursing notes reviewed   history of anesthetic complications: PONV  NPO Solid Status: > 8 hours  NPO Liquid Status: > 8 hours           Airway   Mallampati: II  TM distance: >3 FB  Neck ROM: full  no difficulty expected  Dental - normal exam     Pulmonary - negative pulmonary ROS and normal exam   Cardiovascular - normal exam  Exercise tolerance: good (4-7 METS)    ECG reviewed    (+) hypertension (HLP) well controlled, dysrhythmias (h/o PVA Afib now resolved) Atrial Fib, hyperlipidemia,   (-) angina    PE comment: SB    Neuro/Psych- negative ROS  GI/Hepatic/Renal/Endo    (+)  GI bleeding , thyroid problem hypothyroidism  (-) GERD, liver disease, no renal disease, diabetes    Musculoskeletal     Abdominal    Substance History      OB/GYN          Other                        Anesthesia Plan    ASA 3     general     intravenous induction     Anesthetic plan, all risks, benefits, and alternatives have been provided, discussed and informed consent has been obtained with: patient.    Plan discussed with CRNA.

## 2021-06-12 NOTE — PROGRESS NOTES
[facility] Medicine Services  PROGRESS NOTE    Patient Name: Kade Tillman  : 1945  MRN: [id number]    Date of Admission: 2021  Primary Care Physician: Boaz Lorenzo MD    Subjective   Subjective     CC:  Follow-up for syncope and hematemesis    HPI:  Overnight had 2 episodes of hematemesis, denies associated abdominal pain, or diarrhea, or melena.  Denies chest pain or palpitations.    ROS:  Gen- No fevers, chills  Resp- No cough, dyspnea    Objective   Objective     Vital Signs:   Temp:  [97.8 °F (36.6 °C)-97.9 °F (36.6 °C)] 97.9 °F (36.6 °C)  Heart Rate:  [63-69] 69  Resp:  [16-18] 18  BP: (106-134)/(58-67) 119/58        Physical Exam:  Constitutional: No acute distress, awake, alert  HENT: NCAT, mucous membranes moist  Respiratory: Clear to auscultation bilaterally, respiratory effort normal on room air  Cardiovascular: RRR, no murmurs  Gastrointestinal: Positive bowel sounds, soft, nontender, nondistended  Psychiatric: Appropriate affect, cooperative  Neurologic: Oriented x 3, strength symmetric in all extremities, Cranial Nerves grossly intact to confrontation, speech clear  Skin: No rashes    Results Reviewed:  Results from last 7 days   Lab Units 21  0653 219 21   WBC 10*3/mm3 10.43 11.49* 17.20*   HEMOGLOBIN g/dL 9.0*  9.0* 9.0* 10.9*   HEMATOCRIT % 28.2*  28.2* 28.1* 33.6*   PLATELETS 10*3/mm3 192 191 224   PROCALCITONIN ng/mL  --   --  0.04     Results from last 7 days   Lab Units 21  0653 21   SODIUM mmol/L 140 135*   POTASSIUM mmol/L 3.6 3.2*   CHLORIDE mmol/L 106 97*   CO2 mmol/L 26.0 27.0   BUN mg/dL 33* 51*   CREATININE mg/dL 0.43* 0.60   GLUCOSE mg/dL 103* 129*   CALCIUM mg/dL 8.8 10.3   ALT (SGPT) U/L  --  12   AST (SGOT) U/L  --  17     Estimated Creatinine Clearance: 49.7 mL/min (A) (by C-G formula based on SCr of 0.43 mg/dL (L)).    Microbiology Results Abnormal     Procedure Component Value  - Date/Time    COVID PRE-OP / PRE-PROCEDURE SCREENING ORDER (NO ISOLATION) - Swab, Nasopharynx [[id number]]  (Normal) Collected: 06/11/21 2139    Lab Status: Final result Specimen: Swab from Nasopharynx Updated: 06/11/21 2225    Narrative:      The following orders were created for panel order COVID PRE-OP / PRE-PROCEDURE SCREENING ORDER (NO ISOLATION) - Swab, Nasopharynx.  Procedure                               Abnormality         Status                     ---------                               -----------         ------                     COVID-19 and FLU A/B PCR...[[id number]]  Normal              Final result                 Please view results for these tests on the individual orders.    COVID-19 and FLU A/B PCR - Swab, Nasopharynx [[id number]]  (Normal) Collected: 06/11/21 2139    Lab Status: Final result Specimen: Swab from Nasopharynx Updated: 06/11/21 2225     COVID19 Not Detected     Influenza A PCR Not Detected     Influenza B PCR Not Detected    Narrative:      Fact sheet for providers: https://www.fda.gov/media/779380/download    Fact sheet for patients: https://www.fda.gov/media/645068/download    Test performed by PCR.          Imaging Results (Last 24 Hours)     Procedure Component Value Units Date/Time    XR Chest 1 View [[id number]] Collected: 06/11/21 2244     Updated: 06/11/21 2247    Narrative:      CR Chest 1 Vw    INDICATION:   Difficulty breathing and nausea    COMPARISON:    None available.    FINDINGS:  Single portable AP view(s) of the chest.    The heart and mediastinal contours are normal. The lungs are clear. No pneumothorax or pleural effusion.       Impression:      No acute cardiopulmonary findings.    Signer Name: Tamra Sandra MD   Signed: 6/11/2021 10:44 PM   Workstation Name: [id number]    [facility]    CT Abdomen Pelvis With Contrast [[id number]] Collected: 06/11/21 2240     Updated: 06/11/21 2242    Narrative:      CT Abdomen Pelvis W    INDICATION:   GI  bleeding, syncope and collapse    TECHNIQUE:   CT of the abdomen and pelvis with IV contrast. Coronal and sagittal reconstructions were obtained.  Radiation dose reduction techniques included automated exposure control or exposure modulation based on body size. Count of known CT and cardiac nuc med  studies performed in previous 12 months: 0.     COMPARISON:   None available.    FINDINGS:  Abdomen: Lung bases are clear. There are gallstones. There is a large mass along the posterior body of the wall of the stomach concerning for malignancy. This measures about 4.4 x 4.3 cm. (key images) The kidneys appear unremarkable. Spleen and pancreas  appear unremarkable.    Pelvis: Bowel appears unremarkable. Pelvic structures appear within normal limits. No acute osseous abnormality.      Impression:      There is large a mass along the posterior wall of the body of the stomach concerning for malignancy.          Signer Name: Tamra Sandra MD   Signed: 6/11/2021 10:40 PM   Workstation Name: [id number]    [facility]              I have reviewed the medications:  Scheduled Meds:levothyroxine, 25 mcg, Oral, Q AM  pantoprazole, 40 mg, Intravenous, Q12H  pravastatin, 40 mg, Oral, Daily  sodium chloride, 10 mL, Intravenous, Q12H      Continuous Infusions:lactated ringers, 100 mL/hr, Last Rate: 100 mL/hr (06/11/21 9818)      PRN Meds:.melatonin  •  ondansetron  •  potassium chloride **OR** potassium chloride **OR** potassium chloride  •  Sodium Chloride (PF)  •  sodium chloride    Assessment/Plan   Assessment & Plan     Active Hospital Problems    Diagnosis  POA   • **Acute upper GI bleed [K92.2]  Yes   • Hypokalemia [E87.6]  Yes   • Leukocytosis [D72.829]  Yes   • Paroxysmal atrial fibrillation (CMS/HCC) [I48.0]  Yes   • Hyperlipidemia [E78.5]  Yes   • Congenital 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         Brief Hospital Course to date:  Kade Tillman is a 75 y.o. female With a PMH of HTN, PAF, hypothyroidism, and osteoarthritis who was admitted on 6/11 after syncopal episode associated with hematemesis.      Patient problems new to me today    Hematemesis   Stomach mass   -CT abd/pelvis with large mass along posterior wall of the stomach concerning for malignancy   -Continue PPI and IV fluids  -Plan for EGD today, keep n.p.o.    Syncope  -Check CT head and echocardiogram    Hypokalemia  -EKG stable, replete per protocol    Leukocytosis   -Afebrile, normal pro-Matthias and lactic acid  -Improved    HTN  -Holding antihypertensive medications in the setting of possible GI bleed and syncope  -If stable today we will resume tomorrow morning    PAF  -Status post ablation  -On aspirin only  -Rate controlled currently    Hypothyroidism   -Continue Synthroid    HLD  -Continue statin    DVT prophylaxis:  Mechanical DVT prophylaxis orders are present.       Disposition: I expect the patient to be discharged pending work-up for syncope and stomach mass/hematemesis.    CODE STATUS:   Code Status and Medical Interventions:   Ordered at: 06/11/21 1706     Level Of Support Discussed With:    Patient     Code Status:    CPR     Medical Interventions (Level of Support Prior to Arrest):    Full       Marifer Hightower MD  06/12/21

## 2021-06-12 NOTE — ANESTHESIA PROCEDURE NOTES
Airway  Urgency: elective    Date/Time: 6/12/2021 9:49 AM  Airway not difficult    General Information and Staff    Patient location during procedure: OR  CRNA: Wil Price CRNA    Indications and Patient Condition  Indications for airway management: airway protection    Preoxygenated: yes  MILS not maintained throughout  Mask difficulty assessment: 0 - not attempted    Final Airway Details  Final airway type: endotracheal airway      Successful airway: ETT  Cuffed: yes   Successful intubation technique: direct laryngoscopy and RSI  Facilitating devices/methods: intubating stylet  Endotracheal tube insertion site: oral  Blade: Méndez  Blade size: 2  ETT size (mm): 7.0  Cormack-Lehane Classification: grade IIb - view of arytenoids or posterior of glottis only  Placement verified by: chest auscultation and capnometry   Cuff volume (mL): 6  Measured from: lips  ETT/EBT  to lips (cm): 20  Number of attempts at approach: 1  Assessment: lips, teeth, and gum same as pre-op and atraumatic intubation    Additional Comments  Negative epigastric sounds, Breath sound equal bilaterally with symmetric chest rise and fall. RSI with cricoid pressure until tube confirmation. Lips teeth and gums unchanged. Small knick noted on lip from bite block placed by endo nurse

## 2021-06-13 VITALS
BODY MASS INDEX: 26.5 KG/M2 | DIASTOLIC BLOOD PRESSURE: 47 MMHG | TEMPERATURE: 98.7 F | HEIGHT: 60 IN | SYSTOLIC BLOOD PRESSURE: 127 MMHG | RESPIRATION RATE: 18 BRPM | HEART RATE: 65 BPM | WEIGHT: 135 LBS | OXYGEN SATURATION: 98 %

## 2021-06-13 LAB
ANION GAP SERPL CALCULATED.3IONS-SCNC: 5 MMOL/L (ref 5–15)
BASOPHILS # BLD AUTO: 0.02 10*3/MM3 (ref 0–0.2)
BASOPHILS NFR BLD AUTO: 0.2 % (ref 0–1.5)
BUN SERPL-MCNC: 15 MG/DL (ref 8–23)
BUN/CREAT SERPL: 32.6 (ref 7–25)
CALCIUM SPEC-SCNC: 8.5 MG/DL (ref 8.6–10.5)
CHLORIDE SERPL-SCNC: 109 MMOL/L (ref 98–107)
CO2 SERPL-SCNC: 26 MMOL/L (ref 22–29)
CREAT SERPL-MCNC: 0.46 MG/DL (ref 0.57–1)
DEPRECATED RDW RBC AUTO: 47.9 FL (ref 37–54)
EOSINOPHIL # BLD AUTO: 0.01 10*3/MM3 (ref 0–0.4)
EOSINOPHIL NFR BLD AUTO: 0.1 % (ref 0.3–6.2)
ERYTHROCYTE [DISTWIDTH] IN BLOOD BY AUTOMATED COUNT: 14.1 % (ref 12.3–15.4)
FOLATE SERPL-MCNC: 17.1 NG/ML (ref 4.78–24.2)
GFR SERPL CREATININE-BSD FRML MDRD: 132 ML/MIN/1.73
GLUCOSE SERPL-MCNC: 100 MG/DL (ref 65–99)
HCT VFR BLD AUTO: 21.9 % (ref 34–46.6)
HCT VFR BLD AUTO: 22.9 % (ref 34–46.6)
HCT VFR BLD AUTO: 23.1 % (ref 34–46.6)
HGB BLD-MCNC: 6.9 G/DL (ref 12–15.9)
HGB BLD-MCNC: 7.3 G/DL (ref 12–15.9)
HGB BLD-MCNC: 7.4 G/DL (ref 12–15.9)
IMM GRANULOCYTES # BLD AUTO: 0.06 10*3/MM3 (ref 0–0.05)
IMM GRANULOCYTES NFR BLD AUTO: 0.5 % (ref 0–0.5)
IRON 24H UR-MRATE: 78 MCG/DL (ref 37–145)
IRON SATN MFR SERPL: 29 % (ref 20–50)
LYMPHOCYTES # BLD AUTO: 3.87 10*3/MM3 (ref 0.7–3.1)
LYMPHOCYTES NFR BLD AUTO: 31.2 % (ref 19.6–45.3)
MCH RBC QN AUTO: 30.2 PG (ref 26.6–33)
MCHC RBC AUTO-ENTMCNC: 31.9 G/DL (ref 31.5–35.7)
MCV RBC AUTO: 94.6 FL (ref 79–97)
MONOCYTES # BLD AUTO: 0.89 10*3/MM3 (ref 0.1–0.9)
MONOCYTES NFR BLD AUTO: 7.2 % (ref 5–12)
NEUTROPHILS NFR BLD AUTO: 60.8 % (ref 42.7–76)
NEUTROPHILS NFR BLD AUTO: 7.57 10*3/MM3 (ref 1.7–7)
NRBC BLD AUTO-RTO: 0 /100 WBC (ref 0–0.2)
PLATELET # BLD AUTO: 174 10*3/MM3 (ref 140–450)
PMV BLD AUTO: 10.9 FL (ref 6–12)
POTASSIUM SERPL-SCNC: 3.9 MMOL/L (ref 3.5–5.2)
RBC # BLD AUTO: 2.42 10*6/MM3 (ref 3.77–5.28)
SODIUM SERPL-SCNC: 140 MMOL/L (ref 136–145)
TIBC SERPL-MCNC: 265 MCG/DL (ref 298–536)
TRANSFERRIN SERPL-MCNC: 178 MG/DL (ref 200–360)
VIT B12 BLD-MCNC: 495 PG/ML (ref 211–946)
WBC # BLD AUTO: 12.42 10*3/MM3 (ref 3.4–10.8)

## 2021-06-13 PROCEDURE — 85025 COMPLETE CBC W/AUTO DIFF WBC: CPT | Performed by: INTERNAL MEDICINE

## 2021-06-13 PROCEDURE — P9016 RBC LEUKOCYTES REDUCED: HCPCS

## 2021-06-13 PROCEDURE — 82607 VITAMIN B-12: CPT | Performed by: INTERNAL MEDICINE

## 2021-06-13 PROCEDURE — 85014 HEMATOCRIT: CPT | Performed by: INTERNAL MEDICINE

## 2021-06-13 PROCEDURE — 83540 ASSAY OF IRON: CPT | Performed by: INTERNAL MEDICINE

## 2021-06-13 PROCEDURE — 82746 ASSAY OF FOLIC ACID SERUM: CPT | Performed by: INTERNAL MEDICINE

## 2021-06-13 PROCEDURE — 85018 HEMOGLOBIN: CPT | Performed by: INTERNAL MEDICINE

## 2021-06-13 PROCEDURE — 80048 BASIC METABOLIC PNL TOTAL CA: CPT | Performed by: INTERNAL MEDICINE

## 2021-06-13 PROCEDURE — 86900 BLOOD TYPING SEROLOGIC ABO: CPT

## 2021-06-13 PROCEDURE — 84466 ASSAY OF TRANSFERRIN: CPT | Performed by: INTERNAL MEDICINE

## 2021-06-13 PROCEDURE — 99239 HOSP IP/OBS DSCHRG MGMT >30: CPT | Performed by: INTERNAL MEDICINE

## 2021-06-13 PROCEDURE — 99232 SBSQ HOSP IP/OBS MODERATE 35: CPT | Performed by: INTERNAL MEDICINE

## 2021-06-13 PROCEDURE — 36430 TRANSFUSION BLD/BLD COMPNT: CPT

## 2021-06-13 RX ORDER — PANTOPRAZOLE SODIUM 40 MG/1
40 TABLET, DELAYED RELEASE ORAL 2 TIMES DAILY
Qty: 60 TABLET | Refills: 0 | Status: ON HOLD | OUTPATIENT
Start: 2021-06-13 | End: 2021-07-09

## 2021-06-13 RX ORDER — PANTOPRAZOLE SODIUM 40 MG/1
40 TABLET, DELAYED RELEASE ORAL DAILY
Qty: 30 TABLET | Refills: 2 | Status: SHIPPED | OUTPATIENT
Start: 2021-07-14 | End: 2021-10-12

## 2021-06-13 RX ADMIN — LEVOTHYROXINE SODIUM 25 MCG: 25 TABLET ORAL at 06:24

## 2021-06-13 RX ADMIN — PRAVASTATIN SODIUM 40 MG: 40 TABLET ORAL at 08:00

## 2021-06-13 RX ADMIN — SODIUM CHLORIDE, PRESERVATIVE FREE 10 ML: 5 INJECTION INTRAVENOUS at 08:00

## 2021-06-13 RX ADMIN — PANTOPRAZOLE SODIUM 40 MG: 40 INJECTION, POWDER, FOR SOLUTION INTRAVENOUS at 08:00

## 2021-06-13 NOTE — PLAN OF CARE
Problem: Adult Inpatient Plan of Care  Goal: Plan of Care Review  Outcome: Adequate for Care Transition  Goal: Patient-Specific Goal (Individualized)  Outcome: Adequate for Care Transition  Goal: Absence of Hospital-Acquired Illness or Injury  Outcome: Adequate for Care Transition  Intervention: Identify and Manage Fall Risk  Recent Flowsheet Documentation  Taken 6/13/2021 1210 by Alida Wu RN  Safety Promotion/Fall Prevention:   activity supervised   fall prevention program maintained   nonskid shoes/slippers when out of bed   room organization consistent   safety round/check completed   toileting scheduled  Taken 6/13/2021 1010 by Alida Wu RN  Safety Promotion/Fall Prevention:   activity supervised   fall prevention program maintained   nonskid shoes/slippers when out of bed   room organization consistent   safety round/check completed   toileting scheduled  Taken 6/13/2021 0800 by Alida Wu RN  Safety Promotion/Fall Prevention:   activity supervised   fall prevention program maintained   nonskid shoes/slippers when out of bed   room organization consistent   toileting scheduled   safety round/check completed  Intervention: Prevent Skin Injury  Recent Flowsheet Documentation  Taken 6/13/2021 0800 by Alida Wu RN  Body Position: position changed independently  Skin Protection:   adhesive use limited   incontinence pads utilized  Goal: Optimal Comfort and Wellbeing  Outcome: Adequate for Care Transition  Intervention: Provide Person-Centered Care  Recent Flowsheet Documentation  Taken 6/13/2021 0800 by Alida Wu RN  Trust Relationship/Rapport:   care explained   choices provided   emotional support provided   empathic listening provided   questions answered   questions encouraged   reassurance provided   thoughts/feelings acknowledged  Goal: Readiness for Transition of Care  Outcome: Adequate for Care Transition   Goal Outcome Evaluation:

## 2021-06-13 NOTE — PROGRESS NOTES
"GI Daily Progress Note  Subjective     Kade Fernández is a 75 y.o. female who was admitted with Acute upper GI bleed.     Found to have a gastric mass on CT scan.  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  Biopsies were taken.  She denies abdominal pain.  Stools are still dark but not as bad.    Chief Complaint: Weakness    Objective     /62   Pulse 68   Temp 97.8 °F (36.6 °C) (Oral)   Resp 18   Ht 152.4 cm (60\")   Wt 61.2 kg (135 lb)   SpO2 98%   BMI 26.37 kg/m²     Intake/Output last 3 shifts:  I/O last 3 completed shifts:  In: 1280 [P.O.:480; I.V.:800]  Out: 4500 [Urine:4500]  Intake/Output this shift:  I/O this shift:  In: 360 [P.O.:360]  Out: 600 [Urine:600]      Physical Exam  Wt Readings from Last 3 Encounters:   06/11/21 61.2 kg (135 lb)   05/31/18 61.2 kg (135 lb)   05/25/17 59.9 kg (132 lb)   ,body mass index is 26.37 kg/m².,@FLOWAMB(6)@,@FLOWAMB(5)@,@FLOWAMB(8)@   CONSTITUTIONAL: Lying in bed no distress  Resp CTA; no rhonchi, rales, or wheezes.  Respiration effort normal  CV RRR; no M/R/G. No lower extremity edema  GI Abd soft, NT, ND, normal active bowel sounds.    Psych: Awake alert and oriented    DATA:  Results for KADE FERNÁNDEZ (MRN [id number]) as of 6/13/2021 13:04   Ref. Range 6/12/2021 17:41 6/13/2021 00:14 6/13/2021 07:09 6/13/2021 08:25 6/13/2021 11:26   Hemoglobin Latest Ref Range: 12.0 - 15.9 g/dL 8.9 (L) 7.4 (L) 7.3 (L)  6.9 (C)   Hematocrit Latest Ref Range: 34.0 - 46.6 % 27.4 (L) 23.1 (L) 22.9 (L)  21.9 (L)     Assessment/Plan     1.  Gastric mass  2.  Acute blood loss anemia    Plans are for discharge after being transfused.  Will call Monday or Tuesday with results of the biopsy.  If nondiagnostic will have her return for a full-thickness biopsy.  If diagnostic will refer to surgery              Acute upper GI bleed    Congenital hypothyroidism without goiter    Essential hypertension    Hyperlipidemia    Paroxysmal atrial fibrillation (CMS/HCC)    Hypokalemia    " Leukocytosis    Abnormal CT of the abdomen       LOS: 2 days     Jeff Burks MD  06/13/21  13:09 EDT

## 2021-06-13 NOTE — DISCHARGE SUMMARY
[facility] Medicine Services  DISCHARGE SUMMARY    Patient Name: Kade Tillman  : 1945  MRN: [id number]    Date of Admission: 2021  8:21 PM  Date of Discharge:  2021  Primary Care Physician: Boaz Lorenzo MD    Consults     Date and Time Order Name Status Description    2021 11:40 PM Inpatient Gastroenterology Consult Completed           Hospital Course     Presenting Problem:   Acute upper GI bleed [K92.2]    Active Hospital Problems    Diagnosis  POA   • **Acute upper GI bleed [K92.2]  Yes   • Abnormal CT of the abdomen [R93.5]  Unknown   • Hypokalemia [E87.6]  Yes   • Leukocytosis [D72.829]  Yes   • Paroxysmal atrial fibrillation (CMS/HCC) [I48.0]  Yes   • Hyperlipidemia [E78.5]  Yes   • Congenital 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          Hospital Course:  Kade Tillman is a 75 y.o. female with a past medical history of hypertension, PAF status post ablation on aspirin only, hypothyroidism, and osteoarthritis who was admitted on  after syncopal episode followed by hematemesis.  In the ED CT abdomen and pelvis showed large mass along the posterior wall of stomach concerning for malignancy, she had EGD on  that showed a submucosal mass with ulceration at the fundus, hiatal hernia, and Mariann-Worthington tear, there was stigmata of recent bleeding but Mariann-Worthington tear and ulceration.  Biopsies were taken.  PPI was continued.  Her hemoglobin decreased from 9 to 7.3 overnight, patient denied any more episodes of coffee-ground emesis, melena, or hematemesis.  Iron, folate, and B12 were checked. Repeat H&H at noon with hemoglobin on 6.9, she was transfused 1u PRBCs and discharged home afterwards. Recommended follow up in 3-4 days to repeat CBC with PCP.  Further syncope, CT had was negative and echocardiogram showed normal  EF with some mild aortic mitral and tricuspid regurgitation, trace pulmonic valve regurgitation, and aortic valve was structurally normal with no regurgitation or stenosis.  Suspect patient had a vagal episode from acute blood loss from ulcer/Mariann holden tear.     Discharge Follow Up Recommendations for outpatient labs/diagnostics:  Follow-up with GI for outpatient endoscopic ultrasound  Follow-up with PCP in 3 to 4 days to repeat CBC to monitor hemoglobin    COVID Symptom Date of Onset:  ______  Date of first positive COVID test: ____  Quarantine Complete 20 days after date of onset:_______      Day of Discharge     HPI:   No acute events overnight.  Patient denies any more episodes of coffee-ground emesis, hematemesis, no change in bowel consistency or color.  Denies any abdominal pain, lightheadedness, dizziness, or syncope.    Review of Systems  Gen- No fevers, chills  CV- No chest pain, palpitations  Resp- No cough, dyspnea    Vital Signs:   Temp:  [96.7 °F (35.9 °C)-98.6 °F (37 °C)] 97.9 °F (36.6 °C)  Heart Rate:  [54-68] 66  Resp:  [16-18] 18  BP: (104-127)/(50-65) 119/51     Physical Exam:  Constitutional: No acute distress, awake, alert  HENT: NCAT, mucous membranes moist  Respiratory: Clear to auscultation bilaterally, respiratory effort normal on room air   Cardiovascular: RRR, no murmurs  Gastrointestinal: Positive bowel sounds, soft, nontender, nondistended  Psychiatric: Appropriate affect, cooperative  Neurologic: Oriented x 3, strength symmetric in all extremities, Cranial Nerves grossly intact to confrontation, speech clear  Skin: Has purple, reddish birthmark on chin    Pertinent  and/or Most Recent Results     LAB RESULTS:      Lab 06/13/21  1126 06/13/21  0709 06/13/21  0014 06/12/21  1741 06/12/21  1206 06/12/21  0653 06/11/21  2359 06/11/21  2034   WBC  --  12.42*  --   --   --  10.43 11.49* 17.20*   HEMOGLOBIN 6.9* 7.3* 7.4* 8.9* 9.0* 9.0*  9.0* 9.0* 10.9*   HEMATOCRIT 21.9* 22.9* 23.1*  27.4* 26.7* 28.2*  28.2* 28.1* 33.6*   PLATELETS  --  174  --   --   --  192 191 224   NEUTROS ABS  --  7.57*  --   --   --   --  8.66* 12.25*   IMMATURE GRANS (ABS)  --  0.06*  --   --   --   --  0.04 0.08*   LYMPHS ABS  --  3.87*  --   --   --   --  2.27 3.62*   MONOS ABS  --  0.89  --   --   --   --  0.48 1.14*   EOS ABS  --  0.01  --   --   --   --  0.01 0.04   MCV  --  94.6  --   --   --  92.5 90.4 90.8   PROCALCITONIN  --   --   --   --   --   --   --  0.04   LACTATE  --   --   --   --   --   --   --  1.6         Lab 06/13/21  0709 06/12/21  0653 06/11/21 2034   SODIUM 140 140 135*   POTASSIUM 3.9 3.6 3.2*   CHLORIDE 109* 106 97*   CO2 26.0 26.0 27.0   ANION GAP 5.0 8.0 11.0   BUN 15 33* 51*   CREATININE 0.46* 0.43* 0.60   GLUCOSE 100* 103* 129*   CALCIUM 8.5* 8.8 10.3   MAGNESIUM  --   --  2.1         Lab 06/11/21 2034   TOTAL PROTEIN 6.3   ALBUMIN 3.90   GLOBULIN 2.4   ALT (SGPT) 12   AST (SGOT) 17   BILIRUBIN 0.2   ALK PHOS 66   LIPASE 28                 Lab 06/13/21  0825 06/13/21  0709 06/11/21  2109   IRON  --  78  --    IRON SATURATION  --  29  --    TIBC  --  265*  --    TRANSFERRIN  --  178*  --    FOLATE 17.10  --   --    VITAMIN B 12 495  --   --    ABO TYPING  --   --  O   RH TYPING  --   --  Positive   ANTIBODY SCREEN  --   --  Negative         Brief Urine Lab Results  (Last result in the past 365 days)      Color   Clarity   Blood   Leuk Est   Nitrite   Protein   CREAT   Urine HCG        06/12/21 0309 Yellow Clear Negative Small (1+) Negative Negative             Microbiology Results (last 10 days)     Procedure Component Value - Date/Time    COVID PRE-OP / PRE-PROCEDURE SCREENING ORDER (NO ISOLATION) - Swab, Nasopharynx [[id number]]  (Normal) Collected: 06/11/21 2139    Lab Status: Final result Specimen: Swab from Nasopharynx Updated: 06/11/21 2225    Narrative:      The following orders were created for panel order COVID PRE-OP / PRE-PROCEDURE SCREENING ORDER (NO ISOLATION) - Swab,  Nasopharynx.  Procedure                               Abnormality         Status                     ---------                               -----------         ------                     COVID-19 and FLU A/B PCR...[[id number]]  Normal              Final result                 Please view results for these tests on the individual orders.    COVID-19 and FLU A/B PCR - Swab, Nasopharynx [[id number]]  (Normal) Collected: 06/11/21 2139    Lab Status: Final result Specimen: Swab from Nasopharynx Updated: 06/11/21 2225     COVID19 Not Detected     Influenza A PCR Not Detected     Influenza B PCR Not Detected    Narrative:      Fact sheet for providers: https://www.fda.gov/media/976139/download    Fact sheet for patients: https://www.fda.gov/media/175102/download    Test performed by PCR.          Adult Transthoracic Echo Complete w/ Color, Spectral and Contrast if Necessary Per Protocol    Result Date: 6/12/2021  · Estimated left ventricular EF = 60% · The cardiac valves are anatomically and functionally normal.      CT Head Without Contrast    Result Date: 6/12/2021  EXAMINATION: CT HEAD WO CONTRAST-  INDICATION: syncope; K92.2-Gastrointestinal hemorrhage, unspecified; R55-Syncope and collapse; E87.6-Hypokalemia; K92.0-Hematemesis; R53.1-Weakness; K31.89-Other diseases of stomach and duodenum  TECHNIQUE: Axial noncontrast CT of the head with multiplanar reconstruction  The radiation dose reduction device was turned on for each scan per the ALARA (As Low as Reasonably Achievable) protocol.  COMPARISON: NONE  FINDINGS: Gray-white differentiation is maintained and there is no evidence of intracranial hemorrhage, mass or mass effect. The ventricles are normal in size and configuration. The orbits are unremarkable and the paranasal sinuses are grossly clear. The calvarium is intact.      No acute intracranial abnormality.   This report was finalized on 6/12/2021 1:54 PM by Quinton Contreras.      CT Abdomen Pelvis With  Contrast    Result Date: 6/11/2021  CT Abdomen Pelvis W INDICATION: GI bleeding, syncope and collapse TECHNIQUE: CT of the abdomen and pelvis with IV contrast. Coronal and sagittal reconstructions were obtained.  Radiation dose reduction techniques included automated exposure control or exposure modulation based on body size. Count of known CT and cardiac nuc med studies performed in previous 12 months: 0. COMPARISON: None available. FINDINGS: Abdomen: Lung bases are clear. There are gallstones. There is a large mass along the posterior body of the wall of the stomach concerning for malignancy. This measures about 4.4 x 4.3 cm. (key images) The kidneys appear unremarkable. Spleen and pancreas appear unremarkable. Pelvis: Bowel appears unremarkable. Pelvic structures appear within normal limits. No acute osseous abnormality.     There is large a mass along the posterior wall of the body of the stomach concerning for malignancy. Signer Name: Tamra Sandra MD  Signed: 6/11/2021 10:40 PM  Workstation Name: [id number]  [facility]iology [facility]    XR Chest 1 View    Result Date: 6/11/2021  CR Chest 1 Vw INDICATION: Difficulty breathing and nausea COMPARISON:  None available. FINDINGS: Single portable AP view(s) of the chest. The heart and mediastinal contours are normal. The lungs are clear. No pneumothorax or pleural effusion.     No acute cardiopulmonary findings. Signer Name: Tamra Sandra MD  Signed: 6/11/2021 10:44 PM  Workstation Name: [email or web address]  [facility]      Results for orders placed in visit on 08/18/16    SCANNED VASCULAR STUDIES      Results for orders placed in visit on 08/18/16    SCANNED VASCULAR STUDIES      Results for orders placed during the hospital encounter of 06/11/21    Adult Transthoracic Echo Complete w/ Color, Spectral and Contrast if Necessary Per Protocol    Interpretation Summary  · Estimated left ventricular EF = 60%  · The cardiac valves are anatomically and  functionally normal.      Plan for Follow-up of Pending Labs/Results:   Pending Labs     Order Current Status    Tissue Pathology Exam Collected (06/12/21 1000)        Discharge Details        Discharge Medications      New Medications      Instructions Start Date   pantoprazole 40 MG EC tablet  Commonly known as: Protonix   40 mg, Oral, 2 times daily      pantoprazole 40 MG EC tablet  Commonly known as: PROTONIX   40 mg, Oral, Daily   Start Date: July 14, 2021        Continue These Medications      Instructions Start Date   Acetaminophen 500 MG capsule   1 tablet, Oral, As Needed      aspirin 81 MG tablet   81 mg, Oral, Daily      Calcium 500-125 MG-UNIT tablet   1 tablet, Oral, Daily      cholecalciferol 25 MCG (1000 UT) tablet  Commonly known as: VITAMIN D3   Oral, Daily      Vitamin D 50 MCG (2000 UT) capsule   2,000 Units, Oral, Daily      fluticasone 50 MCG/ACT nasal spray  Commonly known as: FLONASE   1 spray, Nasal, Daily PRN      hydroCHLOROthiazide 25 MG tablet  Commonly known as: HYDRODIURIL   25 mg, Oral, Daily      levothyroxine 25 MCG tablet  Commonly known as: SYNTHROID, LEVOTHROID   25 mcg, Oral, Daily      lisinopril 10 MG tablet  Commonly known as: PRINIVIL,ZESTRIL   10 mg, Oral, Daily,  Restart today       Loratadine 10 MG capsule   10 mg, Oral, Daily PRN      Potassium Gluconate 550 MG tablet   1 tablet, Oral, Daily      pravastatin 40 MG tablet  Commonly known as: PRAVACHOL   40 mg, Oral, Daily      Vitamin B Complex tablet   1 tablet, Oral, Daily      vitamin B-12 1000 MCG tablet  Commonly known as: CYANOCOBALAMIN   1 tablet, Oral, Daily      vitamin C 500 MG tablet  Commonly known as: ASCORBIC ACID   500 mg, Oral, Daily             No Known Allergies      Discharge Disposition:      Diet:  Hospital:  Diet Order   Procedures   • Diet Regular; GI Soft            CODE STATUS:    Code Status and Medical Interventions:   Ordered at: 06/11/21 7881     Level Of Support Discussed With:    Patient      Code Status:    CPR     Medical Interventions (Level of Support Prior to Arrest):    Full       No future appointments.    Additional Instructions for the Follow-ups that You Need to Schedule     Discharge Follow-up with PCP   As directed       Currently Documented PCP:    Boaz Lorenzo MD    PCP Phone Number:    [phone]     Follow Up Details: in 3-4 days, needs repeat CBC         Discharge Follow-up with Specified Provider: Dr. Reeves; 1 Week   As directed      To: Dr. Reeves    Follow Up: 1 Week    Follow Up Details: need EUS                     Marifer Hightower MD  06/13/21      Time Spent on Discharge:  I spent  37  minutes on this discharge activity which included: face-to-face encounter with the patient, reviewing the data in the system, coordination of the care with the nursing staff as well as consultants, documentation, and entering orders.

## 2021-06-13 NOTE — PLAN OF CARE
Goal Outcome Evaluation:           Progress: improving  Outcome Summary: a/o x 4; VSS; RA, SR, H&H closely monitored; denies abdominal pain, nausea; LR @ 100; rested comfortably; no acute episodes to report; will continue to monitor

## 2021-06-14 ENCOUNTER — TELEPHONE (OUTPATIENT)
Dept: GASTROENTEROLOGY | Facility: CLINIC | Age: 76
End: 2021-06-14

## 2021-06-14 LAB
BH BB BLOOD EXPIRATION DATE: NORMAL
BH BB BLOOD TYPE BARCODE: 5100
BH BB DISPENSE STATUS: NORMAL
BH BB PRODUCT CODE: NORMAL
BH BB UNIT NUMBER: NORMAL
CROSSMATCH INTERPRETATION: NORMAL
UNIT  ABO: NORMAL
UNIT  RH: NORMAL

## 2021-06-14 NOTE — TELEPHONE ENCOUNTER
PATIENT CALLED SAYING THAT SHE NEEDED A EUS. I LOOKED IN PATIENTS CHART AND DR SOUZA SAID ON OP NOTE, THAT PATIENT WOULD NEED AN EUS. PLEASE REVIEW AND LET ME KNOW WHAT YOU WOULD LIKE TO DO.     THANK YOU.

## 2021-06-14 NOTE — PAYOR COMM NOTE
"Kriss Keys RN Utilization Review [phone]  Fax # [phone]  Ref # [id number]        Yogesh Tillman (75 y.o. Female)     Date of Birth Social Security Number Address Home Phone MRN    1945  [address] [id number] [phone] [id number]    [employer] Marital Status          Church        Admission Date Admission Type Admitting Provider Attending Provider Department, Room/Bed    21 Emergency Marifer Hightower MD  [facility] 5G, S546/1    Discharge Date Discharge Disposition Discharge Destination        2021 Home or Self Care              Attending Provider: (none)   Allergies: No Known Allergies    Isolation: None   Infection: None   Code Status: Prior    Ht: 152.4 cm (60\")   Wt: 61.2 kg (135 lb)    Admission Cmt: None   Principal Problem: Acute upper GI bleed [K92.2]                 Active Insurance as of 2021     Primary Coverage     Payor Plan Insurance Group Employer/Plan Group    HUMANA MEDICARE REPLACEMENT HUMANA MEDICARE REPLACEMENT [id number]     Payor Plan Address Payor Plan Phone Number Payor Plan Fax Number Effective Dates    [address] [address] [phone]  2018 - None Entered    [facility] [address]       Subscriber Name Subscriber Birth Date Member ID       YOGESH TILLMAN 1945 [id number]                 Emergency Contacts      (Rel.) Home Phone Work Phone Mobile Phone    Cynthia Archuleta (Daughter) [phone] -- [phone]    Jeffery Tillman (Spouse) [phone] -- [phone]    LIANET TILLMAN (Son) -- -- [phone]               History & Physical      Lonnie Velázquez MD at 21 [facility]              [facility] Medicine Services  HISTORY AND PHYSICAL    Patient Name: Yogesh Tillman  : 1945  MRN: [id number]  Primary Care Physician: Boaz Lorenzo MD  Date of admission: 2021    Subjective   Subjective     Chief Complaint:  syncope    HPI:  Yogesh" Elizabeth Tillman is a 75 y.o. female with a past medical history significant for hypertension, HLD, PAF, hypothyroidism, and osteoarthritis. She presents today s/p syncopal episode with associated hematemesis. Patient states she was waiting in line at a restaurant today with she suddenly felt dizzy, naseated and passed out. States she came to and was brought to ED by daughter. While in waiting room, patient had two episodes of hematemesis followed by another syncopal episode. Now reports feeling dizzy and fatigued. Currently there are no complaints of fever, cough, congestion, SOB, or chest pain. No headache or focal weakness/parathesias. No abdominal pain or diarrhea/constipation. No melena or hematochezia. Patient takes low dose ASA daily but denies NSAID or alcohol use. No history of GI bleed. Her last colonoscopy was 5 years ago. Reportedly normal. She is fully vaccinated for COVID-19. Will admit to inpatient for further evaluation and treatment.      COVID Details:    Symptoms:    [x] NONE [] Fever []  Cough [] Shortness of breath [] Change in taste/smell      The patient qualifies to receive the vaccine, but they have not yet received it.      Review of Systems   Constitutional: Negative for chills and fever.   HENT: Negative for congestion and trouble swallowing.    Eyes: Negative for photophobia and visual disturbance.   Respiratory: Negative for cough and shortness of breath.    Cardiovascular: Negative for chest pain and leg swelling.   Gastrointestinal: Positive for nausea and vomiting. Negative for abdominal pain and diarrhea.   Endocrine: Negative for cold intolerance and heat intolerance.   Genitourinary: Negative for dysuria and flank pain.   Musculoskeletal: Negative for back pain and gait problem.   Skin: Negative for pallor and rash.   Allergic/Immunologic: Negative for immunocompromised state.   Neurological: Positive for dizziness and syncope. Negative for facial asymmetry, speech difficulty and  headaches.   Hematological: Negative for adenopathy.   Psychiatric/Behavioral: Negative for agitation and confusion.        All other systems reviewed and are negative.     Personal History     Past Medical History:   Diagnosis Date   • A-fib (CMS/HCC)    • Allergic rhinitis    • Disease of thyroid gland    • Hyperlipidemia    • Hypertension    • Hypothyroidism    • Joint pain     LEFT KNEE TORN MENISCUS   • PONV (postoperative nausea and vomiting)    • Postmenopausal    • Torn meniscus        Past Surgical History:   Procedure Laterality Date   • APPENDECTOMY     • CARDIAC ELECTROPHYSIOLOGY PROCEDURE N/A 2016    Procedure: PVA;  Surgeon: Brian Chow MD;  Location: [facility] INVASIVE LOCATION;  Service:    • CATARACT EXTRACTION     •  SECTION     • COLONOSCOPY     • EYE SURGERY      BILATERAL CATARACTS REMOVED   • HYSTERECTOMY         Family History: family history includes Heart attack in her brother and father; Heart failure in her brother; Hypertension in her brother. Otherwise pertinent FHx was reviewed and unremarkable.     Social History:  reports that she has never smoked. She has never used smokeless tobacco. She reports that she does not drink alcohol and does not use drugs.  Social History     Social History Narrative    Pt consumes 2 or 3 servings of caffeine per week.        Medications:  Acetaminophen, Calcium, Loratadine, Potassium Gluconate, Vitamin B Complex, Vitamin D, aspirin, cholecalciferol, fluticasone, hydroCHLOROthiazide, levothyroxine, lisinopril, pravastatin, vitamin B-12, and vitamin C    No Known Allergies    Objective   Objective     Vital Signs:   Temp:  [97.9 °F (36.6 °C)] 97.9 °F (36.6 °C)  Heart Rate:  [63-65] 64  Resp:  [18] 18  BP: (106-127)/(62-67) 127/62    Physical Exam   Constitutional: Awake, alert  Eyes: PERRLA, sclerae anicteric, no conjunctival injection  HENT: NCAT, mucous membranes moist  Neck: Supple, no thyromegaly, no lymphadenopathy, trachea  midline  Respiratory: Clear to auscultation bilaterally, nonlabored respirations   Cardiovascular: RRR, no murmurs, rubs, or gallops, palpable pedal pulses bilaterally  Gastrointestinal: Positive bowel sounds, soft, nontender, nondistended  Musculoskeletal: No bilateral ankle edema, no clubbing or cyanosis to extremities  Psychiatric: Appropriate affect, cooperative  Neurologic: Oriented x 3, strength symmetric in all extremities, Cranial Nerves grossly intact to confrontation, speech clear  Skin: No rashes      Results Reviewed:  I have personally reviewed most recent indicated data and agree with findings including:  [x]  Laboratory  [x]  Radiology  []  EKG/Telemetry  []  Pathology  []  Cardiac/Vascular Studies  []  Old records  []  Other:  Most pertinent findings include: vitals stable. Sodium 135. Potassium 3.2. WBC 17.2. H/H 10.9 and 33.6 respectively.  CXR clear. CT A/P pending      LAB RESULTS:      Lab 06/11/21 2034   WBC 17.20*   HEMOGLOBIN 10.9*   HEMATOCRIT 33.6*   PLATELETS 224   NEUTROS ABS 12.25*   IMMATURE GRANS (ABS) 0.08*   LYMPHS ABS 3.62*   MONOS ABS 1.14*   EOS ABS 0.04   MCV 90.8   LACTATE 1.6         Lab 06/11/21 2034   SODIUM 135*   POTASSIUM 3.2*   CHLORIDE 97*   CO2 27.0   ANION GAP 11.0   BUN 51*   CREATININE 0.60   GLUCOSE 129*   CALCIUM 10.3         Lab 06/11/21 2034   TOTAL PROTEIN 6.3   ALBUMIN 3.90   GLOBULIN 2.4   ALT (SGPT) 12   AST (SGOT) 17   BILIRUBIN 0.2   ALK PHOS 66   LIPASE 28                 Lab 06/11/21 2109   ABO TYPING O   RH TYPING Positive   ANTIBODY SCREEN Negative         Brief Urine Lab Results     None        Microbiology Results (last 10 days)     ** No results found for the last 240 hours. **          No radiology results from the last 24 hrs        Assessment/Plan   Assessment & Plan       Acute upper GI bleed    Hypokalemia    Leukocytosis    Essential hypertension    Paroxysmal atrial fibrillation (CMS/HCC)    Congenital hypothyroidism without goiter     Hyperlipidemia      1. Acute Upper GI Bleed:  - with hematemesis. Hemoglobin 10.9. will continue to trend and transfuse as needed  - hold ASA  - consult to GI  - if N/V persists will consider NG placement  - scheduled IV PPI  - NPO  - IVF  - CT imaging back and notes posterior gastric mass concerning for malignancy  - am labs    2. Hypokalemia:  - obtain EKG  - check magnesium  - replace as needed per protocol    3. Leukocytosis:  - afebrile. Check procalcitonin, lactic acid  - check UA, CXR    4. Hypertension:  - BP labile. Currently stable  - in the setting of syncope will hold antihypertensives for now    5. PAF:  - secondary to anesthesia. Followed by Dr. White  - stable and rate controlled. S/p pulmonary vein ablation by Dr. Chow with no recurrences. Was taking ASA only    6. Hypothyroid:  - continue synthroid    7. HLD:  - continue statin    DVT prophylaxis: mechanical    CODE STATUS:  Full code  Code Status and Medical Interventions:   Ordered at: 06/11/21 1844     Level Of Support Discussed With:    Patient     Code Status:    CPR     Medical Interventions (Level of Support Prior to Arrest):    Full       This note has been completed as part of a split-shared workflow.     Electronically signed by Vitor Valencia PA-C, 06/11/21, 11:10 PM EDT.      Brief Attending Admission Attestation     I have seen and examined the patient, performing an independent face-to-face diagnostic evaluation with plan of care reviewed and developed with the advanced practice clinician (APC).    Old records reviewed and summarized in PM hx.    Brief Summary Statement:  75-year-old female presented to ED secondary-syncope event.  Around 1 PM patient started feeling diaphoretic heart came home had a nap, woke up around 5:30 PM, went to restaurant to get some food, started feeling lightheaded while she was standing, no focal neurological symptoms, sat down, and when she got up she had a syncope event which patient does not  recall.  Was brought in to ED by EMS-at triage patient had episode of vomiting-look like hematemesis, and again had syncope event.  Patient denied any complaint of abdominal pain, no increased reflux symptoms, uses aspirin daily but no other NSAIDs use reported.  Does have chronic constipation takes as needed MiraLAX, with last BM yesterday.  Do not complain of any fever or chills or diarrhea.  Last colonoscopy 5 years back, never has history of peptic ulcer disease never had a EGD done.  No complaint of chest pain, exertional dyspnea, palpitations.  Denies any weight loss or change in appetite  In ER patient was started on Protonix drip, normal saline 1 L  Remainder of detailed HPI is as noted above and has been reviewed and/or edited by me for completeness.    PM HX:  CAD-aspirin 81 mg  Hypertension-HCTZ 25 mg, lisinopril 10 mg  Hypothyroid-Synthroid 25 mcg  Hyperlipidemia-Zocor 40 mg daily  Paroxysmal A. fib-s/p PVA, no recurrent A. fib since then.    Vitals:    06/11/21 2145   BP: 127/62   Pulse:  64   Resp:  18-94%   Temp:  106/63-1 27/62   SpO2: 94%       Attending Physical Exam:  RS- CTA-BL, ,  No wheezing , no crackles, good effort.  CVS- s1s2 regular, no murmur.  ABD- soft, non tender, not distended, no organomegaly.  EXT- no edema.  NEURO- AAO-3, no focal defecit.      LABS:  Glucose-129, sodium 135, potassium three-point  BUN/creatinine 15/0.6  Albumin 3.9  Lactic acid 1.6  Lipase 28  WBC 17  Hemoglobin 11, hematocrit 33, platelets 224, neutrophils 71  EKG sinus rhythm 66 bpm, low voltage,   Covid test negative  CT abdomen pelvis-with contrast-shows large mass along the posterior wall of body of the stomach concerning for malignancy.    Brief Assessment/Plan  Likely upper GI bleed-though patient gives no history of gastritis or reflux problem-started on PPI will continue, if symptom recurs, will need NG tube.  -Close monitoring of the CBC.  -N.p.o. and GI consult in a.m. for possible  endoscopy.  -Replace electrolytes  -Leukocytosis-chest x-ray no acute abnormality, no dysuria symptoms, will send UA.  CT abdomen results back-showing a large mass in the posterior stomach wall.  Telemetry monitor-given history of arrhythmia in the past.    See above for further detailed assessment and plan developed with APC which I have reviewed and/or edited for completeness.        Admission Status: I believe that this patient meets inpatient criteria, secondary to acute GI bleed.              Electronically signed by Lonnie Velázquez MD at 06/12/21 0602          Emergency Department Notes      Franklin Eugene MD at 06/11/21 2118            [place]    EMERGENCY DEPARTMENT ENCOUNTER      Pt Name: Kade Tillman  MRN: [id number]  YOB: 1945  Date of evaluation: 6/11/2021  Provider: Franklin Eugene MD    CHIEF COMPLAINT       Chief Complaint   Patient presents with   • Vomiting         HISTORY OF PRESENT ILLNESS  (Location/Symptom, Timing/Onset, Context/Setting, Quality, Duration, Modifying Factors, Severity.)   Kade Tillman is a 75 y.o. female who presents to the emergency department with syncope and hematemesis.  Patient states that she has generally felt unwell all day today and states that the symptoms have been progressively worsening and are now moderate to severe and primarily exacerbated with epps movements or standing up from a seated position.  She did have 1 syncopal episode upon standing from a seated position prior to coming to the emergency department but denies any preceding headache, chest pain, shortness of breath, abdominal pain, back pain, or injury associated with the fall.  She had an additional syncopal episode in our lobby along with large-volume hematemesis.  She denies any prior history of GI bleeding, liver disease, alcohol abuse, NSAID abuse, or history of AAA and this was her first episode of hematemesis.  She denies any bright red blood per rectum or  melena over the past couple of days as well.      Nursing notes were reviewed.    REVIEW OF SYSTEMS    (2-9 systems for level 4, 10 or more for level 5)   ROS:  General: Weakness  Cardiovascular:  No chest pain, no palpitations  Respiratory:  No shortness of breath, no cough, no wheezing  Gastrointestinal: Hematemesis  Musculoskeletal:  No muscle pain, no joint pain  Skin:  No rash  Neurologic:  No speech problems, no headache, no extremity numbness, no extremity tingling, no extremity weakness  Psychiatric:  No anxiety  Genitourinary:  No dysuria, no hematuria    Except as noted above the remainder of the review of systems was reviewed and negative.       PAST MEDICAL HISTORY     Past Medical History:   Diagnosis Date   • A-fib (CMS/HCC)    • Allergic rhinitis    • Disease of thyroid gland    • Hyperlipidemia    • Hypertension    • Hypothyroidism    • Joint pain     LEFT KNEE TORN MENISCUS   • PONV (postoperative nausea and vomiting)    • Postmenopausal    • Torn meniscus          SURGICAL HISTORY       Past Surgical History:   Procedure Laterality Date   • APPENDECTOMY     • CARDIAC ELECTROPHYSIOLOGY PROCEDURE N/A 2016    Procedure: PVA;  Surgeon: Brian Chow MD;  Location: [facility] INVASIVE LOCATION;  Service:    • CATARACT EXTRACTION     •  SECTION     • COLONOSCOPY     • EYE SURGERY      BILATERAL CATARACTS REMOVED   • HYSTERECTOMY           CURRENT MEDICATIONS       Current Facility-Administered Medications:   •  lactated ringers infusion, 100 mL/hr, Intravenous, Continuous, Vitor Valencia PA-C, Last Rate: 100 mL/hr at 21 2353, 100 mL/hr at 21 2353  •  levothyroxine (SYNTHROID, LEVOTHROID) tablet 25 mcg, 25 mcg, Oral, Q AM, Vitor Valencia PA-C, 25 mcg at 21 0620  •  melatonin tablet 5 mg, 5 mg, Oral, Nightly PRN, Vitor Valencia PA-C  •  ondansetron (ZOFRAN) injection 4 mg, 4 mg, Intravenous, Q6H PRN, Vitor Valencia PA-C  •  pantoprazole (PROTONIX)  injection 40 mg, 40 mg, Intravenous, Q12H, Vitor Valencia PA-C  •  potassium chloride (MICRO-K) CR capsule 40 mEq, 40 mEq, Oral, PRN, 40 mEq at 06/12/21 0301 **OR** potassium chloride (KLOR-CON) packet 40 mEq, 40 mEq, Oral, PRN **OR** potassium chloride 10 mEq in 100 mL IVPB, 10 mEq, Intravenous, Q1H PRN, Vitor Valencia PA-C  •  pravastatin (PRAVACHOL) tablet 40 mg, 40 mg, Oral, Daily, Vitor Valencia PA-C  •  Sodium Chloride (PF) 0.9 % 10 mL, 10 mL, Intravenous, PRN, Franklin Eugene MD  •  sodium chloride 0.9 % flush 10 mL, 10 mL, Intravenous, Q12H, Vitor Valencia PA-C  •  sodium chloride 0.9 % flush 10 mL, 10 mL, Intravenous, PRN, Vitor Valencia PA-C    ALLERGIES     Patient has no known allergies.    FAMILY HISTORY       Family History   Problem Relation Age of Onset   • Heart attack Father    • Hypertension Brother    • Heart failure Brother    • Heart attack Brother           SOCIAL HISTORY       Social History     Socioeconomic History   • Marital status:      Spouse name: Not on file   • Number of children: Not on file   • Years of education: Not on file   • Highest education level: Not on file   Tobacco Use   • Smoking status: Never Smoker   • Smokeless tobacco: Never Used   Substance and Sexual Activity   • Alcohol use: No   • Drug use: No   • Sexual activity: Defer     Comment: POSTMENOPAUSAL         PHYSICAL EXAM    (up to 7 for level 4, 8 or more for level 5)     Vitals:    06/11/21 2100 06/11/21 2145 06/11/21 2337 06/12/21 0245   BP: 106/63 127/62 134/60 118/62   BP Location:   Left arm Left arm   Patient Position:   Lying Lying   Pulse: 64  66 63   Resp:   18 16   Temp:    97.8 °F (36.6 °C)   TempSrc:    Oral   SpO2: 92% 94%     Weight:       Height:           Physical Exam  General: Awake, alert, no acute distress.  HEENT: Conjunctiva normal.  Neck: Trachea midline.  Cardiac: Heart regular rate, rhythm, no murmurs, rubs, or gallops  Lungs: Lungs are clear to auscultation,  there is no wheezing, rhonchi, or rales. There is no use of accessory muscles.  Chest wall: There is no tenderness to palpation over the chest wall or over ribs  Abdomen: Abdomen is soft, nontender, nondistended. There are no firm or pulsatile masses, no rebound rigidity or guarding.   Musculoskeletal: No deformity.  Neuro: Alert and oriented x 4.  Dermatology: Skin is warm and dry  Psych: Mentation is grossly normal, cognition is grossly normal. Affect is appropriate.        DIAGNOSTIC RESULTS     EKG: All EKG's are interpreted by the Emergency Department Physician who either signs or Co-signs this chart in the absence of a cardiologist.    Sinus rhythm rate of 66, no acute ST segment or T wave change, normal intervals, no ectopy    RADIOLOGY:   Non-plain film images such as CT, Ultrasound and MRI are read by the radiologist. Plain radiographic images are visualized and preliminarily interpreted by the emergency physician with the below findings:      [x] Radiologist's Report Reviewed:  XR Chest 1 View   Final Result   No acute cardiopulmonary findings.      Signer Name: Tamra Sandra MD    Signed: 6/11/2021 10:44 PM    Workstation Name: [id number]     Radiology [facility]      CT Abdomen Pelvis With Contrast   Final Result   There is large a mass along the posterior wall of the body of the stomach concerning for malignancy.               Signer Name: Tamra Sandra MD    Signed: 6/11/2021 10:40 PM    Workstation Name: [id number]     [facility]          LABS:    I have reviewed and interpreted all of the currently available lab results from this visit (if applicable):  Results for orders placed or performed during the hospital encounter of 06/11/21   COVID-19 and FLU A/B PCR - Swab, Nasopharynx    Specimen: Nasopharynx; Swab   Result Value Ref Range    COVID19 Not Detected Not Detected - Ref. Range    Influenza A PCR Not Detected Not Detected    Influenza B PCR Not Detected Not  Detected   Comprehensive Metabolic Panel    Specimen: Blood   Result Value Ref Range    Glucose 129 (H) 65 - 99 mg/dL    BUN 51 (H) 8 - 23 mg/dL    Creatinine 0.60 0.57 - 1.00 mg/dL    Sodium 135 (L) 136 - 145 mmol/L    Potassium 3.2 (L) 3.5 - 5.2 mmol/L    Chloride 97 (L) 98 - 107 mmol/L    CO2 27.0 22.0 - 29.0 mmol/L    Calcium 10.3 8.6 - 10.5 mg/dL    Total Protein 6.3 6.0 - 8.5 g/dL    Albumin 3.90 3.50 - 5.20 g/dL    ALT (SGPT) 12 1 - 33 U/L    AST (SGOT) 17 1 - 32 U/L    Alkaline Phosphatase 66 39 - 117 U/L    Total Bilirubin 0.2 0.0 - 1.2 mg/dL    eGFR Non African Amer 97 >60 mL/min/1.73    Globulin 2.4 gm/dL    A/G Ratio 1.6 g/dL    BUN/Creatinine Ratio 85.0 (H) 7.0 - 25.0    Anion Gap 11.0 5.0 - 15.0 mmol/L   Lipase    Specimen: Blood   Result Value Ref Range    Lipase 28 13 - 60 U/L   Lactic Acid, Plasma    Specimen: Blood   Result Value Ref Range    Lactate 1.6 0.5 - 2.0 mmol/L   CBC Auto Differential    Specimen: Blood   Result Value Ref Range    WBC 17.20 (H) 3.40 - 10.80 10*3/mm3    RBC 3.70 (L) 3.77 - 5.28 10*6/mm3    Hemoglobin 10.9 (L) 12.0 - 15.9 g/dL    Hematocrit 33.6 (L) 34.0 - 46.6 %    MCV 90.8 79.0 - 97.0 fL    MCH 29.5 26.6 - 33.0 pg    MCHC 32.4 31.5 - 35.7 g/dL    RDW 13.5 12.3 - 15.4 %    RDW-SD 44.9 37.0 - 54.0 fl    MPV 10.5 6.0 - 12.0 fL    Platelets 224 140 - 450 10*3/mm3    Neutrophil % 71.3 42.7 - 76.0 %    Lymphocyte % 21.0 19.6 - 45.3 %    Monocyte % 6.6 5.0 - 12.0 %    Eosinophil % 0.2 (L) 0.3 - 6.2 %    Basophil % 0.4 0.0 - 1.5 %    Immature Grans % 0.5 0.0 - 0.5 %    Neutrophils, Absolute 12.25 (H) 1.70 - 7.00 10*3/mm3    Lymphocytes, Absolute 3.62 (H) 0.70 - 3.10 10*3/mm3    Monocytes, Absolute 1.14 (H) 0.10 - 0.90 10*3/mm3    Eosinophils, Absolute 0.04 0.00 - 0.40 10*3/mm3    Basophils, Absolute 0.07 0.00 - 0.20 10*3/mm3    Immature Grans, Absolute 0.08 (H) 0.00 - 0.05 10*3/mm3    nRBC 0.0 0.0 - 0.2 /100 WBC   Procalcitonin    Specimen: Blood   Result Value Ref Range     Procalcitonin 0.04 0.00 - 0.25 ng/mL   Urinalysis With Culture If Indicated - Urine, Random Void    Specimen: Urine, Random Void   Result Value Ref Range    Color, UA Yellow Yellow, Straw    Appearance, UA Clear Clear    pH, UA 5.5 5.0 - 8.0    Specific Gravity, UA 1.027 1.001 - 1.030    Glucose, UA Negative Negative    Ketones, UA Negative Negative    Bilirubin, UA Negative Negative    Blood, UA Negative Negative    Protein, UA Negative Negative    Leuk Esterase, UA Small (1+) (A) Negative    Nitrite, UA Negative Negative    Urobilinogen, UA 0.2 E.U./dL 0.2 - 1.0 E.U./dL   Magnesium    Specimen: Blood   Result Value Ref Range    Magnesium 2.1 1.6 - 2.4 mg/dL   CBC Auto Differential    Specimen: Blood   Result Value Ref Range    WBC 11.49 (H) 3.40 - 10.80 10*3/mm3    RBC 3.11 (L) 3.77 - 5.28 10*6/mm3    Hemoglobin 9.0 (L) 12.0 - 15.9 g/dL    Hematocrit 28.1 (L) 34.0 - 46.6 %    MCV 90.4 79.0 - 97.0 fL    MCH 28.9 26.6 - 33.0 pg    MCHC 32.0 31.5 - 35.7 g/dL    RDW 13.4 12.3 - 15.4 %    RDW-SD 44.1 37.0 - 54.0 fl    MPV 10.7 6.0 - 12.0 fL    Platelets 191 140 - 450 10*3/mm3    Neutrophil % 75.3 42.7 - 76.0 %    Lymphocyte % 19.8 19.6 - 45.3 %    Monocyte % 4.2 (L) 5.0 - 12.0 %    Eosinophil % 0.1 (L) 0.3 - 6.2 %    Basophil % 0.3 0.0 - 1.5 %    Immature Grans % 0.3 0.0 - 0.5 %    Neutrophils, Absolute 8.66 (H) 1.70 - 7.00 10*3/mm3    Lymphocytes, Absolute 2.27 0.70 - 3.10 10*3/mm3    Monocytes, Absolute 0.48 0.10 - 0.90 10*3/mm3    Eosinophils, Absolute 0.01 0.00 - 0.40 10*3/mm3    Basophils, Absolute 0.03 0.00 - 0.20 10*3/mm3    Immature Grans, Absolute 0.04 0.00 - 0.05 10*3/mm3    nRBC 0.0 0.0 - 0.2 /100 WBC   Urinalysis, Microscopic Only - Urine, Random Void    Specimen: Urine, Random Void   Result Value Ref Range    RBC, UA None Seen None Seen, 0-2 /HPF    WBC, UA None Seen None Seen, 0-2 /HPF    Bacteria, UA Trace None Seen, Trace /HPF    Squamous Epithelial Cells, UA      Methodology Automated Microscopy    ECG  12 Lead   Result Value Ref Range    QT Interval 386 ms    QTC Interval 404 ms   Type & Screen    Specimen: Blood   Result Value Ref Range    ABO Type O     RH type Positive     Antibody Screen Negative     T&S Expiration Date 6/14/2021 11:59:59 PM    ABO RH Specimen Verification    Specimen: Blood   Result Value Ref Range    ABO Type O     RH type Positive    Prepare RBC, 1 Units   Result Value Ref Range    Product Code T7563C42     Unit Number [id number]     UNIT  ABO O     UNIT  RH POS     Crossmatch Interpretation Compatible     Dispense Status XM     Blood Expiration Date [id number]     Blood Type Barcode 5100    Green Top (Gel)   Result Value Ref Range    Extra Tube Hold for add-ons.    Lavender Top   Result Value Ref Range    Extra Tube hold for add-on    Gold Top - SST   Result Value Ref Range    Extra Tube Hold for add-ons.    Gray Top   Result Value Ref Range    Extra Tube Hold for add-ons.         All other labs were within normal range or not returned as of this dictation.      EMERGENCY DEPARTMENT COURSE and DIFFERENTIAL DIAGNOSIS/MDM:   Vitals:    Vitals:    06/11/21 2100 06/11/21 2145 06/11/21 2337 06/12/21 0245   BP: 106/63 127/62 134/60 118/62   BP Location:   Left arm Left arm   Patient Position:   Lying Lying   Pulse: 64  66 63   Resp:   18 16   Temp:    97.8 °F (36.6 °C)   TempSrc:    Oral   SpO2: 92% 94%     Weight:       Height:           ED Course as of Jun 12 0726 Fri Jun 11, 2021   2153 Spoke w/ Dr. Santiago who accepts the pt for admission.    [NS]   2154 Pt remains HD stable on re-evaluation.    [NS]      ED Course User Index  [NS] Franklin Eugene MD       Patient stable throughout the duration of her stay in the emergency department.  There is no tachycardia or hypotension.  She does have some mild anemia on initial hemoglobin evaluation.  Given her large volume hematemesis in our lobby, strong concern that GI bleeding may be the cause of the symptoms and her syncopal episodes.   There is nothing on history or exam that would suggest acute neurovascular or cardiovascular cause apart from volume depletion.  Her ECG demonstrates no evidence of syncope syndrome, ACS, or dysrhythmia and there is no evidence of myocardial injury or significant electrolyte derangement based on laboratory evaluation.  Patient was given IV fluids and Protonix in the emergency department and will be admitted to the hospital service for further evaluation.      MEDICATIONS ADMINISTERED IN ED:  Medications   Sodium Chloride (PF) 0.9 % 10 mL (has no administration in time range)   pravastatin (PRAVACHOL) tablet 40 mg (has no administration in time range)   levothyroxine (SYNTHROID, LEVOTHROID) tablet 25 mcg (25 mcg Oral Given 6/12/21 0620)   sodium chloride 0.9 % flush 10 mL (10 mL Intravenous Not Given 6/12/21 0130)   sodium chloride 0.9 % flush 10 mL (has no administration in time range)   lactated ringers infusion (100 mL/hr Intravenous New Bag 6/11/21 2353)   melatonin tablet 5 mg (has no administration in time range)   ondansetron (ZOFRAN) injection 4 mg (has no administration in time range)   potassium chloride (MICRO-K) CR capsule 40 mEq (40 mEq Oral Given 6/12/21 0301)     Or   potassium chloride (KLOR-CON) packet 40 mEq ( Oral Not Given:  See Alt 6/12/21 0301)     Or   potassium chloride 10 mEq in 100 mL IVPB ( Intravenous Not Given:  See Alt 6/12/21 0301)   pantoprazole (PROTONIX) injection 40 mg (has no administration in time range)   sodium chloride 0.9 % bolus 1,000 mL (0 mL Intravenous Stopped 6/11/21 2255)   pantoprazole (PROTONIX) injection 80 mg (80 mg Intravenous Given 6/11/21 2124)   iopamidol (ISOVUE-300) 61 % injection 100 mL (95 mL Intravenous Given 6/11/21 2222)         FINAL IMPRESSION      1. Acute upper GI bleed    2. Syncope, unspecified syncope type    3. Hypokalemia    4. Hematemesis with nausea    5. Generalized weakness          DISPOSITION/PLAN     ED Disposition     ED Disposition  Condition Comment    Decision to Admit  Level of Care: Telemetry [5]   Diagnosis: Acute upper GI bleed [094696]   Admitting Physician: DOROTA MYERS [[id number]]   Attending Physician: DOROTA MYERS [[id number]]   Certification: I Certify That Inpatient Hospital Services Are Medically Necessary For Greater Than 2 Midnights              Franklin Eugene MD  Attending Emergency Physician               Franklin Eugene MD  06/12/21 0727      Electronically signed by Franklin Eugene MD at 06/12/21 0727          Operative/Procedure Notes (all)      Procedures signed by Rebecca Gómez MD at 06/12/21 1034   Version 1 of 1     Procedure Orders    1. UPPER GI ENDOSCOPY [[id number]] ordered by Rebecca Gómez MD at 06/12/21 0806          Scan on 6/12/2021 0806 by Rebecca Gómez MD: EGD          Electronically signed by Rebecca Gómez MD at 06/12/21 1034     Rebecca Gómez MD at 06/12/21 0956  Version 2 of 2         ESOPHAGOGASTRODUODENOSCOPY  Progress Note    Kade Tillman  6/12/2021    Pre-op Diagnosis:   gastric mass on CT       Post-Op Diagnosis Codes:   Mariann Worthington Tear   Submucosal lesion with ulceration s/p biopsy   Small hiatal hernia    Procedure/CPT® Codes:        Procedure(s):  ESOPHAGOGASTRODUODENOSCOPY    Surgeon(s):  Rebecca Gómez MD    Anesthesia: Monitored Anesthesia Care    Staff:   Circulator: Monika Lindsey RN  Scrub Person: Violet Dutton  Assistant: Ervin Matthew APRN  Assistant: Ervin Matthew APRN      Estimated Blood Loss: minimal    Urine Voided: * No values recorded between 6/12/2021  9:42 AM and 6/12/2021 10:10 AM *    Specimens:                Specimens     ID Source Type Tests Collected By Collected At Frozen?    A Gastric, Antrum Tissue · TISSUE PATHOLOGY EXAM   Rebecca Gómez MD 6/12/21 1000     Description: biopsy of gastric mass    Comment: biopsy of gastric mass    This specimen was not marked as sent.                Drains: * No LDAs found *    Findings:   1.  Submucosal mass  with ulceration at fundus  2.  Hiatal hernia  3.  Mariann Worthington tear    Biopsies of lesion obtained;  Biopsies also sent for flow cytometry  Restart diet  Will plan for outpatient EUS    Complications: none    Assistant: Ervin Matthew APRN  was responsible for performing the following activities: none and their skilled assistance was necessary for the success of this case.    Rebecca Gómez MD     Date: 6/12/2021  Time: 10:15 EDT     ADDENDUM    Went back to room to discuss findings with patient and daughter.  They are aware that we may not have pathology and cytology back before discharge,.  Anticipate that patient will be ok for discharge tomorrow with outpatient GI follow up        Electronically signed by Rebecca Gómez MD at 06/12/21 1227     Rebecca Gómez MD at 06/12/21 4497  Version 1 of 2         ESOPHAGOGASTRODUODENOSCOPY  Progress Note    Kade Tillman  6/12/2021    Pre-op Diagnosis:   gastric mass on CT       Post-Op Diagnosis Codes:   Mariann Worthington Tear   Submucosal lesion with ulceration s/p biopsy   Small hiatal hernia    Procedure/CPT® Codes:        Procedure(s):  ESOPHAGOGASTRODUODENOSCOPY    Surgeon(s):  Rebecca Gómez MD    Anesthesia: Monitored Anesthesia Care    Staff:   Circulator: Monika Lindsey RN  Scrub Person: Violet Dutton  Assistant: Ervin Matthew APRN  Assistant: Ervin Matthew APRN      Estimated Blood Loss: minimal    Urine Voided: * No values recorded between 6/12/2021  9:42 AM and 6/12/2021 10:10 AM *    Specimens:                Specimens     ID Source Type Tests Collected By Collected At Frozen?    A Gastric, Antrum Tissue · TISSUE PATHOLOGY EXAM   Rebecca Gómez MD 6/12/21 1000     Description: biopsy of gastric mass    Comment: biopsy of gastric mass    This specimen was not marked as sent.                Drains: * No LDAs found *    Findings:   1.  Submucosal mass with ulceration at fundus  2.  Hiatal hernia  3.  Mariann Worthington tear    Biopsies of lesion  "obtained;  Biopsies also sent for flow cytometry  Restart diet  Will plan for outpatient EUS    Complications: none    Assistant: Ervin Matthew APRN  was responsible for performing the following activities: none and their skilled assistance was necessary for the success of this case.    Rebecca Gómez MD     Date: 6/12/2021  Time: 10:15 EDT        Electronically signed by Rebecca Gómez MD at 06/12/21 1017          Physician Progress Notes (all)      Jeff Burks MD at 06/13/21 1309          GI Daily Progress Note  Subjective     Yogesh Tillman is a 75 y.o. female who was admitted with Acute upper GI bleed.     Found to have a gastric mass on CT scan.  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  Biopsies were taken.  She denies abdominal pain.  Stools are still dark but not as bad.    Chief Complaint: Weakness    Objective     /62   Pulse 68   Temp 97.8 °F (36.6 °C) (Oral)   Resp 18   Ht 152.4 cm (60\")   Wt 61.2 kg (135 lb)   SpO2 98%   BMI 26.37 kg/m²     Intake/Output last 3 shifts:  I/O last 3 completed shifts:  In: 1280 [P.O.:480; I.V.:800]  Out: 4500 [Urine:4500]  Intake/Output this shift:  I/O this shift:  In: 360 [P.O.:360]  Out: 600 [Urine:600]      Physical Exam  Wt Readings from Last 3 Encounters:   06/11/21 61.2 kg (135 lb)   05/31/18 61.2 kg (135 lb)   05/25/17 59.9 kg (132 lb)   ,body mass index is 26.37 kg/m².,@FLOWAMB(6)@,@FLOWAMB(5)@,@FLOWAMB(8)@   CONSTITUTIONAL: Lying in bed no distress  Resp CTA; no rhonchi, rales, or wheezes.  Respiration effort normal  CV RRR; no M/R/G. No lower extremity edema  GI Abd soft, NT, ND, normal active bowel sounds.    Psych: Awake alert and oriented    DATA:  Results for YOGESH TILLMAN (MRN [id number]) as of 6/13/2021 13:04   Ref. Range 6/12/2021 17:41 6/13/2021 00:14 6/13/2021 07:09 6/13/2021 08:25 6/13/2021 11:26   Hemoglobin Latest Ref Range: 12.0 - 15.9 g/dL 8.9 (L) 7.4 (L) 7.3 (L)  6.9 (C)   Hematocrit Latest Ref Range: 34.0 - 46.6 % " 27.4 (L) 23.1 (L) 22.9 (L)  21.9 (L)     Assessment/Plan     1.  Gastric mass  2.  Acute blood loss anemia    Plans are for discharge after being transfused.  Will call Monday or Tuesday with results of the biopsy.  If nondiagnostic will have her return for a full-thickness biopsy.  If diagnostic will refer to surgery              Acute upper GI bleed    Congenital hypothyroidism without goiter    Essential hypertension    Hyperlipidemia    Paroxysmal atrial fibrillation (CMS/HCC)    Hypokalemia    Leukocytosis    Abnormal CT of the abdomen       LOS: 2 days     Jeff Burks MD  21  13:09 EDT    Electronically signed by Jeff Burks MD at 21 1311     Marifer Hightower MD at 21 0880              [facility] Medicine Services  PROGRESS NOTE    Patient Name: Kade Tillman  : 1945  MRN: [id number]    Date of Admission: 2021  Primary Care Physician: Boaz Lorenzo MD    Subjective   Subjective     CC:  Follow-up for syncope and hematemesis    HPI:  Overnight had 2 episodes of hematemesis, denies associated abdominal pain, or diarrhea, or melena.  Denies chest pain or palpitations.    ROS:  Gen- No fevers, chills  Resp- No cough, dyspnea    Objective   Objective     Vital Signs:   Temp:  [97.8 °F (36.6 °C)-97.9 °F (36.6 °C)] 97.9 °F (36.6 °C)  Heart Rate:  [63-69] 69  Resp:  [16-18] 18  BP: (106-134)/(58-67) 119/58        Physical Exam:  Constitutional: No acute distress, awake, alert  HENT: NCAT, mucous membranes moist  Respiratory: Clear to auscultation bilaterally, respiratory effort normal on room air  Cardiovascular: RRR, no murmurs  Gastrointestinal: Positive bowel sounds, soft, nontender, nondistended  Psychiatric: Appropriate affect, cooperative  Neurologic: Oriented x 3, strength symmetric in all extremities, Cranial Nerves grossly intact to confrontation, speech clear  Skin: No rashes    Results Reviewed:  Results from last 7 days   Lab  Units 06/12/21  0653 06/11/21  2359 06/11/21 2034   WBC 10*3/mm3 10.43 11.49* 17.20*   HEMOGLOBIN g/dL 9.0*  9.0* 9.0* 10.9*   HEMATOCRIT % 28.2*  28.2* 28.1* 33.6*   PLATELETS 10*3/mm3 192 191 224   PROCALCITONIN ng/mL  --   --  0.04     Results from last 7 days   Lab Units 06/12/21  0653 06/11/21 2034   SODIUM mmol/L 140 135*   POTASSIUM mmol/L 3.6 3.2*   CHLORIDE mmol/L 106 97*   CO2 mmol/L 26.0 27.0   BUN mg/dL 33* 51*   CREATININE mg/dL 0.43* 0.60   GLUCOSE mg/dL 103* 129*   CALCIUM mg/dL 8.8 10.3   ALT (SGPT) U/L  --  12   AST (SGOT) U/L  --  17     Estimated Creatinine Clearance: 49.7 mL/min (A) (by C-G formula based on SCr of 0.43 mg/dL (L)).    Microbiology Results Abnormal     Procedure Component Value - Date/Time    COVID PRE-OP / PRE-PROCEDURE SCREENING ORDER (NO ISOLATION) - Swab, Nasopharynx [[id number]]  (Normal) Collected: 06/11/21 2139    Lab Status: Final result Specimen: Swab from Nasopharynx Updated: 06/11/21 2225    Narrative:      The following orders were created for panel order COVID PRE-OP / PRE-PROCEDURE SCREENING ORDER (NO ISOLATION) - Swab, Nasopharynx.  Procedure                               Abnormality         Status                     ---------                               -----------         ------                     COVID-19 and FLU A/B PCR...[[id number]]  Normal              Final result                 Please view results for these tests on the individual orders.    COVID-19 and FLU A/B PCR - Swab, Nasopharynx [[id number]]  (Normal) Collected: 06/11/21 2139    Lab Status: Final result Specimen: Swab from Nasopharynx Updated: 06/11/21 2225     COVID19 Not Detected     Influenza A PCR Not Detected     Influenza B PCR Not Detected    Narrative:      Fact sheet for providers: https://www.fda.gov/media/534556/download    Fact sheet for patients: https://www.fda.gov/media/273464/download    Test performed by PCR.          Imaging Results (Last 24 Hours)     Procedure Component Value  Units Date/Time    XR Chest 1 View [[id number]] Collected: 06/11/21 2244     Updated: 06/11/21 2247    Narrative:      CR Chest 1 Vw    INDICATION:   Difficulty breathing and nausea    COMPARISON:    None available.    FINDINGS:  Single portable AP view(s) of the chest.    The heart and mediastinal contours are normal. The lungs are clear. No pneumothorax or pleural effusion.       Impression:      No acute cardiopulmonary findings.    Signer Name: Tamra Sandra MD   Signed: 6/11/2021 10:44 PM   Workstation Name: [id number]    [facility]    CT Abdomen Pelvis With Contrast [[id number]] Collected: 06/11/21 2240     Updated: 06/11/21 2242    Narrative:      CT Abdomen Pelvis W    INDICATION:   GI bleeding, syncope and collapse    TECHNIQUE:   CT of the abdomen and pelvis with IV contrast. Coronal and sagittal reconstructions were obtained.  Radiation dose reduction techniques included automated exposure control or exposure modulation based on body size. Count of known CT and cardiac nuc med  studies performed in previous 12 months: 0.     COMPARISON:   None available.    FINDINGS:  Abdomen: Lung bases are clear. There are gallstones. There is a large mass along the posterior body of the wall of the stomach concerning for malignancy. This measures about 4.4 x 4.3 cm. (key images) The kidneys appear unremarkable. Spleen and pancreas  appear unremarkable.    Pelvis: Bowel appears unremarkable. Pelvic structures appear within normal limits. No acute osseous abnormality.      Impression:      There is large a mass along the posterior wall of the body of the stomach concerning for malignancy.          Signer Name: Tamra Sandra MD   Signed: 6/11/2021 10:40 PM   Workstation Name: [id number]    [facility]              I have reviewed the medications:  Scheduled Meds:levothyroxine, 25 mcg, Oral, Q AM  pantoprazole, 40 mg, Intravenous, Q12H  pravastatin, 40 mg, Oral, Daily  sodium  chloride, 10 mL, Intravenous, Q12H      Continuous Infusions:lactated ringers, 100 mL/hr, Last Rate: 100 mL/hr (06/11/21 4460)      PRN Meds:.melatonin  •  ondansetron  •  potassium chloride **OR** potassium chloride **OR** potassium chloride  •  Sodium Chloride (PF)  •  sodium chloride    Assessment/Plan   Assessment & Plan     Active Hospital Problems    Diagnosis  POA   • **Acute upper GI bleed [K92.2]  Yes   • Hypokalemia [E87.6]  Yes   • Leukocytosis [D72.829]  Yes   • Paroxysmal atrial fibrillation (CMS/HCC) [I48.0]  Yes   • Hyperlipidemia [E78.5]  Yes   • Congenital 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        Brief Hospital Course to date:  Kade Tillman is a 75 y.o. female With a PMH of HTN, PAF, hypothyroidism, and osteoarthritis who was admitted on 6/11 after syncopal episode associated with hematemesis.      Patient problems new to me today    Hematemesis   Stomach mass   -CT abd/pelvis with large mass along posterior wall of the stomach concerning for malignancy   -Continue PPI and IV fluids  -Plan for EGD today, keep n.p.o.    Syncope  -Check CT head and echocardiogram    Hypokalemia  -EKG stable, replete per protocol    Leukocytosis   -Afebrile, normal pro-Matthias and lactic acid  -Improved    HTN  -Holding antihypertensive medications in the setting of possible GI bleed and syncope  -If stable today we will resume tomorrow morning    PAF  -Status post ablation  -On aspirin only  -Rate controlled currently    Hypothyroidism   -Continue Synthroid    HLD  -Continue statin    DVT prophylaxis:  Mechanical DVT prophylaxis orders are present.       Disposition: I expect the patient to be discharged pending work-up for syncope and stomach mass/hematemesis.    CODE STATUS:   Code Status and Medical Interventions:   Ordered at: 06/11/21 0662     Level Of Support Discussed With:    Patient     Code  Status:    CPR     Medical Interventions (Level of Support Prior to Arrest):    Full       Marifer Hightower MD  06/12/21                Electronically signed by Marifer Hightower MD at 06/12/21 0854          Consult Notes (all)      Ervin MatthewFRANCISCA at 06/12/21 0800      Consult Orders    1. Inpatient Gastroenterology Consult [[id number]] ordered by Vitor Valencia PA-C at 06/11/21 2215          Attestation signed by Rebecca Gómez MD at 06/12/21 1051    I have reviewed this documentation and agree. Patient seen and examined with Mr. Matthew.  Discussed with patient and family who agree with EGD                    [facility] Gastroenterology Consult    Referring Provider: Provider, No Known    PCP: Boaz Lorenzo MD    Reason for Consultation: GI bleed    Chief complaint: I threw up blood and passed out    History of present illness:    Kade Tillman is a 75 y.o. female who is admitted following a syncopal episode at a local restaurant.  Patient notes a 24-hour onset of symptoms noting that she went to the grocery store and struggled with weakness, fatigue, and feeling flushed initially thinking the store itself was too hot but that upon returning to home she continued to feel ill and rested for short period of time.  Upon waking, patient went to a local diner to obtain dinner for her family where she experienced a syncopal episode and collapsed on the floor.  Does not recall any attributing factors leading up to the episode but does note that she began to feel nauseous immediately thereafter.  Family brought patient to emergency department where she then began to experience hematemesis.  Patient does not endorse any melena or change in bowel habits over the past few months, no document shortness of breath, chest pain, fever/chills, or sick contacts.  Past medical, surgical, social, family history is reviewed for accuracy.  No documented history of gastric or bowel cancer in immediate family but  does have a family history of cholangiocarcinoma in a brother.  Patient is not anticoagulated and uses 2-3 doses of Naprosyn per month.  No documented alleviating or exacerbating factors.  CT imaging obtained emergency department is significant for a posterior wall stomach body mass.  Laboratory findings indicative of anemia.  At time of exam, patient resting comfortably in bed no acute distress.  Denies pain.    Allergies:  Patient has no known allergies.    Scheduled Meds:  famotidine, 20 mg, Oral, Once  levothyroxine, 25 mcg, Oral, Q AM  pantoprazole, 40 mg, Intravenous, Q12H  pravastatin, 40 mg, Oral, Daily  sodium chloride, 10 mL, Intravenous, Q12H  sodium chloride, 10 mL, Intravenous, Q12H         Infusions:  lactated ringers, 100 mL/hr, Last Rate: 100 mL/hr (06/11/21 8334)  lactated ringers, 9 mL/hr, Last Rate: 9 mL/hr (06/12/21 0562)        PRN Meds:  lactated ringers  •  lidocaine PF 1%  •  melatonin  •  midazolam  •  ondansetron  •  ondansetron  •  potassium chloride **OR** potassium chloride **OR** potassium chloride  •  Sodium Chloride (PF)  •  sodium chloride  •  sodium chloride    Home Meds:  Medications Prior to Admission   Medication Sig Dispense Refill Last Dose   • Acetaminophen 500 MG coapsule Take 1 tablet by mouth as needed.      • aspirin 81 MG tablet Take 1 tablet by mouth Daily. 30 tablet 11    • B Complex Vitamins (VITAMIN B COMPLEX) tablet Take 1 tablet by mouth daily.      • Calcium 500-125 MG-UNIT tablet Take 1 tablet by mouth daily.      • Cholecalciferol (VITAMIN D) 2000 units capsule Take 2,000 Units by mouth Daily.      • cholecalciferol (VITAMIN D3) 1000 units tablet Take  by mouth Daily.      • fluticasone (FLONASE) 50 MCG/ACT nasal spray 1 spray into each nostril daily as needed.      • hydrochlorothiazide (HYDRODIURIL) 25 MG tablet Take 25 mg by mouth daily.      • levothyroxine (SYNTHROID, LEVOTHROID) 25 MCG tablet Take 25 mcg by mouth daily.      • lisinopril (PRINIVIL,ZESTRIL)  10 MG tablet Take 10 mg by mouth daily.   Restart today      • Loratadine 10 MG capsule Take 10 mg by mouth daily as needed.      • Potassium Gluconate 550 MG tablet Take 1 tablet by mouth daily.      • pravastatin (PRAVACHOL) 40 MG tablet Take 40 mg by mouth daily.      • vitamin B-12 (CYANOCOBALAMIN) 1000 MCG tablet Take 1 tablet by mouth daily.      • vitamin C (ASCORBIC ACID) 500 MG tablet Take 500 mg by mouth daily.        ROS: Review of Systems   Constitutional: Positive for activity change and fatigue. Negative for appetite change, chills, diaphoresis, fever and unexpected weight change.   HENT: Negative for sore throat, trouble swallowing and voice change.    Eyes: Negative.    Respiratory: Negative for apnea, cough, choking, chest tightness, shortness of breath, wheezing and stridor.    Cardiovascular: Negative for chest pain, palpitations and leg swelling.   Gastrointestinal: Positive for nausea and vomiting. Negative for abdominal distention, abdominal pain, anal bleeding, blood in stool, constipation, diarrhea and rectal pain.   Endocrine: Negative.    Genitourinary: Negative.    Musculoskeletal: Negative.    Skin: Positive for pallor. Negative for color change, rash and wound.   Allergic/Immunologic: Negative.    Neurological: Positive for dizziness, syncope and light-headedness.   Hematological: Negative for adenopathy. Does not bruise/bleed easily.   Psychiatric/Behavioral: Negative.    All other systems reviewed and are negative.    PAST MED HX:  Past Medical History:   Diagnosis Date   • A-fib (CMS/HCC)    • Allergic rhinitis    • Disease of thyroid gland    • Hyperlipidemia    • Hypertension    • Hypothyroidism    • Joint pain     LEFT KNEE TORN MENISCUS   • PONV (postoperative nausea and vomiting)    • Postmenopausal    • Torn meniscus      PAST SURG HX:  Past Surgical History:   Procedure Laterality Date   • APPENDECTOMY     • CARDIAC ELECTROPHYSIOLOGY PROCEDURE N/A 7/13/2016    Procedure: PVA;   "Surgeon: Brian Chow MD;  Location: [facility] INVASIVE LOC[facility];  Service:    • CATARACT EXTRACTION     •  SECTION     • COLONOSCOPY     • EYE SURGERY      BILATERAL CATARACTS REMOVED   • HYSTERECTOMY       FAM HX:  Family History   Problem Relation Age of Onset   • Heart attack Father    • Hypertension Brother    • Heart failure Brother    • Heart attack Brother      SOC HX:  Social History     Socioeconomic History   • Marital status:      Spouse name: Not on file   • Number of children: Not on file   • Years of education: Not on file   • Highest education level: Not on file   Tobacco Use   • Smoking status: Never Smoker   • Smokeless tobacco: Never Used   Substance and Sexual Activity   • Alcohol use: No   • Drug use: No   • Sexual activity: Defer     Comment: POSTMENOPAUSAL     PHYSICAL EXAM  /73 (BP Location: Left arm, Patient Position: Lying)   Pulse 68   Temp 98.3 °F (36.8 °C) (Temporal)   Resp 16   Ht 152.4 cm (60\")   Wt 61.2 kg (135 lb)   SpO2 97%   BMI 26.37 kg/m²   Wt Readings from Last 3 Encounters:   21 61.2 kg (135 lb)   18 61.2 kg (135 lb)   17 59.9 kg (132 lb)   ,body mass index is 26.37 kg/m².  Physical Exam  Vitals and nursing note reviewed.   Constitutional:       General: She is not in acute distress.     Appearance: Normal appearance. She is normal weight. She is not ill-appearing or toxic-appearing.   HENT:      Head: Normocephalic and atraumatic.      Mouth/Throat:      Mouth: Mucous membranes are moist.      Pharynx: Oropharynx is clear. No oropharyngeal exudate.   Eyes:      General: No scleral icterus.     Extraocular Movements: Extraocular movements intact.      Conjunctiva/sclera: Conjunctivae normal.      Pupils: Pupils are equal, round, and reactive to light.   Cardiovascular:      Rate and Rhythm: Normal rate and regular rhythm.      Pulses: Normal pulses.      Heart sounds: Murmur heard.   No friction rub. No gallop.    Pulmonary: "      Effort: Pulmonary effort is normal. No respiratory distress.      Breath sounds: Normal breath sounds.   Abdominal:      General: Abdomen is flat. Bowel sounds are normal. There is no distension.      Palpations: Abdomen is soft. There is no mass.      Tenderness: There is no abdominal tenderness. There is no guarding or rebound.      Hernia: No hernia is present.   Genitourinary:     Comments: Defer  Musculoskeletal:         General: Normal range of motion.      Cervical back: Normal range of motion and neck supple. No rigidity or tenderness.      Right lower leg: No edema.      Left lower leg: No edema.   Lymphadenopathy:      Cervical: No cervical adenopathy.   Skin:     General: Skin is warm and dry.      Capillary Refill: Capillary refill takes less than 2 seconds.      Coloration: Skin is pale. Skin is not jaundiced.      Comments: Small birthmark appreciated on patient's chin   Neurological:      General: No focal deficit present.      Mental Status: She is alert and oriented to person, place, and time.   Psychiatric:         Mood and Affect: Mood normal.         Behavior: Behavior normal.         Thought Content: Thought content normal.         Judgment: Judgment normal.         Results Review:   I reviewed the patient's new clinical results.  I reviewed the patient's new imaging results and agree with the interpretation.  I personally viewed and interpreted the patient's EKG/Telemetry data    Lab Results   Component Value Date    WBC 10.43 06/12/2021    HGB 9.0 (L) 06/12/2021    HGB 9.0 (L) 06/12/2021    HGB 9.0 (L) 06/11/2021    HCT 28.2 (L) 06/12/2021    HCT 28.2 (L) 06/12/2021    MCV 92.5 06/12/2021     06/12/2021       Lab Results   Component Value Date    INR 0.98 07/12/2016       Lab Results   Component Value Date    GLUCOSE 103 (H) 06/12/2021    BUN 33 (H) 06/12/2021    CREATININE 0.43 (L) 06/12/2021    EGFRIFNONA 143 06/12/2021    BCR 76.7 (H) 06/12/2021     06/12/2021    K 3.6  06/12/2021    CO2 26.0 06/12/2021    CALCIUM 8.8 06/12/2021    ALBUMIN 3.90 06/11/2021    ALKPHOS 66 06/11/2021    BILITOT 0.2 06/11/2021    ALT 12 06/11/2021    AST 17 06/11/2021     CT Abdomen Pelvis With Contrast  Result Date: 6/11/2021  CT Abdomen Pelvis W INDICATION: GI bleeding, syncope and collapse TECHNIQUE: CT of the abdomen and pelvis with IV contrast. Coronal and sagittal reconstructions were obtained.  Radiation dose reduction techniques included automated exposure control or exposure modulation based on body size. Count of known CT and cardiac nuc med studies performed in previous 12 months: 0. COMPARISON: None available. FINDINGS: Abdomen: Lung bases are clear. There are gallstones. There is a large mass along the posterior body of the wall of the stomach concerning for malignancy. This measures about 4.4 x 4.3 cm. (key images) The kidneys appear unremarkable. Spleen and pancreas appear unremarkable. Pelvis: Bowel appears unremarkable. Pelvic structures appear within normal limits. No acute osseous abnormality.     There is large a mass along the posterior wall of the body of the stomach concerning for malignancy. Signer Name: Tamra Sandra MD  Signed: 6/11/2021 10:40 PM  Workstation Name: [id number]  [facility]    XR Chest 1 View  Result Date: 6/11/2021  CR Chest 1 Vw INDICATION: Difficulty breathing and nausea COMPARISON:  None available. FINDINGS: Single portable AP view(s) of the chest. The heart and mediastinal contours are normal. The lungs are clear. No pneumothorax or pleural effusion.     No acute cardiopulmonary findings. Signer Name: Tamra Sandra MD  Signed: 6/11/2021 10:44 PM  Workstation Name: [id number]  [facility]    COVID19   Date Value Ref Range Status   06/11/2021 Not Detected Not Detected - Ref. Range Final     ASSESSMENTS/PLANS  1.  Gastric mass  2.  Hematemesis, related to above  3.  PAF, status post ablation, no anticoagulation  4.   Essential hypertension    Kade Tillman is a 75 y.o. female admitted to hospital with acute onset of hematemesis.  CT imaging obtained in emergency department significant for a large posterior wall mass of stomach body.  Differentials would include an upper GI malignancy i.e. adenocarcinoma, lymphoma versus complex ulceration.        >>> Recommend EGD today to evaluate posterior stomach wall mass  >>> Continue n.p.o.  >>> Obtain informed consent for esophagogastroduodenoscopy with biopsy of mass  >>> We will start IV PPI twice daily; transition to p.o. post procedure.  >>> Continue to trend H&H and transfuse for hemoglobins less than 7 or symptomatic anemia per protocol.  >>> We will attempt to obtain adequate sampling via EGD however, as noted by image sampling above, mass appears walled off within internal structure-will likely require outpatient EUS for definitive sampling.    I discussed the patient's findings and my recommendations with patient, family and nursing staff    FRANCISCA Mercado  21  10:11 EDT      Electronically signed by Rebecca Gómez MD at 21 1051          Discharge Summary      Marifer Hightower MD at 21 0842              [facility] Medicine Services  DISCHARGE SUMMARY    Patient Name: Kade Tillman  : 1945  MRN: [id number]    Date of Admission: 2021  8:21 PM  Date of Discharge:  2021  Primary Care Physician: Boaz Lorenzo MD    Consults     Date and Time Order Name Status Description    2021 11:40 PM Inpatient Gastroenterology Consult Completed           Hospital Course     Presenting Problem:   Acute upper GI bleed [K92.2]    Active Hospital Problems    Diagnosis  POA   • **Acute upper GI bleed [K92.2]  Yes   • Abnormal CT of the abdomen [R93.5]  Unknown   • Hypokalemia [E87.6]  Yes   • Leukocytosis [D72.829]  Yes   • Paroxysmal atrial fibrillation (CMS/HCC) [I48.0]  Yes   • Hyperlipidemia [E78.5]  Yes    • Congenital 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          Hospital Course:  Kade Tillman is a 75 y.o. female with a past medical history of hypertension, PAF status post ablation on aspirin only, hypothyroidism, and osteoarthritis who was admitted on June 11 after syncopal episode followed by hematemesis.  In the ED CT abdomen and pelvis showed large mass along the posterior wall of stomach concerning for malignancy, she had EGD on June 12 that showed a submucosal mass with ulceration at the fundus, hiatal hernia, and Mariann-Worthington tear, there was stigmata of recent bleeding but Mariann-Worthington tear and ulceration.  Biopsies were taken.  PPI was continued.  Her hemoglobin decreased from 9 to 7.3 overnight, patient denied any more episodes of coffee-ground emesis, melena, or hematemesis.  Iron, folate, and B12 were checked. Repeat H&H at noon with hemoglobin on 6.9, she was transfused 1u PRBCs and discharged home afterwards. Recommended follow up in 3-4 days to repeat CBC with PCP.  Further syncope, CT had was negative and echocardiogram showed normal EF with some mild aortic mitral and tricuspid regurgitation, trace pulmonic valve regurgitation, and aortic valve was structurally normal with no regurgitation or stenosis.  Suspect patient had a vagal episode from acute blood loss from ulcer/Mariann worthington tear.     Discharge Follow Up Recommendations for outpatient labs/diagnostics:  Follow-up with GI for outpatient endoscopic ultrasound  Follow-up with PCP in 3 to 4 days to repeat CBC to monitor hemoglobin    COVID Symptom Date of Onset:  ______  Date of first positive COVID test: ____  Quarantine Complete 20 days after date of onset:_______      Day of Discharge     HPI:   No acute events overnight.  Patient denies any more episodes of coffee-ground emesis, hematemesis, no change in bowel  consistency or color.  Denies any abdominal pain, lightheadedness, dizziness, or syncope.    Review of Systems  Gen- No fevers, chills  CV- No chest pain, palpitations  Resp- No cough, dyspnea    Vital Signs:   Temp:  [96.7 °F (35.9 °C)-98.6 °F (37 °C)] 97.9 °F (36.6 °C)  Heart Rate:  [54-68] 66  Resp:  [16-18] 18  BP: (104-127)/(50-65) 119/51     Physical Exam:  Constitutional: No acute distress, awake, alert  HENT: NCAT, mucous membranes moist  Respiratory: Clear to auscultation bilaterally, respiratory effort normal on room air   Cardiovascular: RRR, no murmurs  Gastrointestinal: Positive bowel sounds, soft, nontender, nondistended  Psychiatric: Appropriate affect, cooperative  Neurologic: Oriented x 3, strength symmetric in all extremities, Cranial Nerves grossly intact to confrontation, speech clear  Skin: Has purple, reddish birthmark on chin    Pertinent  and/or Most Recent Results     LAB RESULTS:      Lab 06/13/21  1126 06/13/21  0709 06/13/21  0014 06/12/21  1741 06/12/21  1206 06/12/21  0653 06/11/21  2359 06/11/21  2034   WBC  --  12.42*  --   --   --  10.43 11.49* 17.20*   HEMOGLOBIN 6.9* 7.3* 7.4* 8.9* 9.0* 9.0*  9.0* 9.0* 10.9*   HEMATOCRIT 21.9* 22.9* 23.1* 27.4* 26.7* 28.2*  28.2* 28.1* 33.6*   PLATELETS  --  174  --   --   --  192 191 224   NEUTROS ABS  --  7.57*  --   --   --   --  8.66* 12.25*   IMMATURE GRANS (ABS)  --  0.06*  --   --   --   --  0.04 0.08*   LYMPHS ABS  --  3.87*  --   --   --   --  2.27 3.62*   MONOS ABS  --  0.89  --   --   --   --  0.48 1.14*   EOS ABS  --  0.01  --   --   --   --  0.01 0.04   MCV  --  94.6  --   --   --  92.5 90.4 90.8   PROCALCITONIN  --   --   --   --   --   --   --  0.04   LACTATE  --   --   --   --   --   --   --  1.6         Lab 06/13/21  0709 06/12/21  0653 06/11/21 2037   SODIUM 140 140 135*   POTASSIUM 3.9 3.6 3.2*   CHLORIDE 109* 106 97*   CO2 26.0 26.0 27.0   ANION GAP 5.0 8.0 11.0   BUN 15 33* 51*   CREATININE 0.46* 0.43* 0.60   GLUCOSE 100*  103* 129*   CALCIUM 8.5* 8.8 10.3   MAGNESIUM  --   --  2.1         Lab 06/11/21  2034   TOTAL PROTEIN 6.3   ALBUMIN 3.90   GLOBULIN 2.4   ALT (SGPT) 12   AST (SGOT) 17   BILIRUBIN 0.2   ALK PHOS 66   LIPASE 28                 Lab 06/13/21  0825 06/13/21  0709 06/11/21  2109   IRON  --  78  --    IRON SATURATION  --  29  --    TIBC  --  265*  --    TRANSFERRIN  --  178*  --    FOLATE 17.10  --   --    VITAMIN B 12 495  --   --    ABO TYPING  --   --  O   RH TYPING  --   --  Positive   ANTIBODY SCREEN  --   --  Negative         Brief Urine Lab Results  (Last result in the past 365 days)      Color   Clarity   Blood   Leuk Est   Nitrite   Protein   CREAT   Urine HCG        06/12/21 0309 Yellow Clear Negative Small (1+) Negative Negative             Microbiology Results (last 10 days)     Procedure Component Value - Date/Time    COVID PRE-OP / PRE-PROCEDURE SCREENING ORDER (NO ISOLATION) - Swab, Nasopharynx [[id number]]  (Normal) Collected: 06/11/21 2139    Lab Status: Final result Specimen: Swab from Nasopharynx Updated: 06/11/21 2225    Narrative:      The following orders were created for panel order COVID PRE-OP / PRE-PROCEDURE SCREENING ORDER (NO ISOLATION) - Swab, Nasopharynx.  Procedure                               Abnormality         Status                     ---------                               -----------         ------                     COVID-19 and FLU A/B PCR...[[id number]]  Normal              Final result                 Please view results for these tests on the individual orders.    COVID-19 and FLU A/B PCR - Swab, Nasopharynx [[id number]]  (Normal) Collected: 06/11/21 2139    Lab Status: Final result Specimen: Swab from Nasopharynx Updated: 06/11/21 2225     COVID19 Not Detected     Influenza A PCR Not Detected     Influenza B PCR Not Detected    Narrative:      Fact sheet for providers: https://www.fda.gov/media/963723/download    Fact sheet for patients:  https://www.fda.gov/media/878185/download    Test performed by PCR.          Adult Transthoracic Echo Complete w/ Color, Spectral and Contrast if Necessary Per Protocol    Result Date: 6/12/2021  · Estimated left ventricular EF = 60% · The cardiac valves are anatomically and functionally normal.      CT Head Without Contrast    Result Date: 6/12/2021  EXAMINATION: CT HEAD WO CONTRAST-  INDICATION: syncope; K92.2-Gastrointestinal hemorrhage, unspecified; R55-Syncope and collapse; E87.6-Hypokalemia; K92.0-Hematemesis; R53.1-Weakness; K31.89-Other diseases of stomach and duodenum  TECHNIQUE: Axial noncontrast CT of the head with multiplanar reconstruction  The radiation dose reduction device was turned on for each scan per the ALARA (As Low as Reasonably Achievable) protocol.  COMPARISON: NONE  FINDINGS: Gray-white differentiation is maintained and there is no evidence of intracranial hemorrhage, mass or mass effect. The ventricles are normal in size and configuration. The orbits are unremarkable and the paranasal sinuses are grossly clear. The calvarium is intact.      No acute intracranial abnormality.   This report was finalized on 6/12/2021 1:54 PM by Quinton Contreras.      CT Abdomen Pelvis With Contrast    Result Date: 6/11/2021  CT Abdomen Pelvis W INDICATION: GI bleeding, syncope and collapse TECHNIQUE: CT of the abdomen and pelvis with IV contrast. Coronal and sagittal reconstructions were obtained.  Radiation dose reduction techniques included automated exposure control or exposure modulation based on body size. Count of known CT and cardiac nuc med studies performed in previous 12 months: 0. COMPARISON: None available. FINDINGS: Abdomen: Lung bases are clear. There are gallstones. There is a large mass along the posterior body of the wall of the stomach concerning for malignancy. This measures about 4.4 x 4.3 cm. (key images) The kidneys appear unremarkable. Spleen and pancreas appear unremarkable. Pelvis:  Bowel appears unremarkable. Pelvic structures appear within normal limits. No acute osseous abnormality.     There is large a mass along the posterior wall of the body of the stomach concerning for malignancy. Signer Name: Tamra Sandra MD  Signed: 6/11/2021 10:40 PM  Workstation Name: [id number]  [facility]    XR Chest 1 View    Result Date: 6/11/2021  CR Chest 1 Vw INDICATION: Difficulty breathing and nausea COMPARISON:  None available. FINDINGS: Single portable AP view(s) of the chest. The heart and mediastinal contours are normal. The lungs are clear. No pneumothorax or pleural effusion.     No acute cardiopulmonary findings. Signer Name: Tamra Sandra MD  Signed: 6/11/2021 10:44 PM  Workstation Name: [id number]  [facility]      Results for orders placed in visit on 08/18/16    SCANNED VASCULAR STUDIES      Results for orders placed in visit on 08/18/16    SCANNED VASCULAR STUDIES      Results for orders placed during the hospital encounter of 06/11/21    Adult Transthoracic Echo Complete w/ Color, Spectral and Contrast if Necessary Per Protocol    Interpretation Summary  · Estimated left ventricular EF = 60%  · The cardiac valves are anatomically and functionally normal.      Plan for Follow-up of Pending Labs/Results:   Pending Labs     Order Current Status    Tissue Pathology Exam Collected (06/12/21 1000)        Discharge Details        Discharge Medications      New Medications      Instructions Start Date   pantoprazole 40 MG EC tablet  Commonly known as: Protonix   40 mg, Oral, 2 times daily      pantoprazole 40 MG EC tablet  Commonly known as: PROTONIX   40 mg, Oral, Daily   Start Date: July 14, 2021        Continue These Medications      Instructions Start Date   Acetaminophen 500 MG capsule   1 tablet, Oral, As Needed      aspirin 81 MG tablet   81 mg, Oral, Daily      Calcium 500-125 MG-UNIT tablet   1 tablet, Oral, Daily      cholecalciferol 25 MCG (1000  UT) tablet  Commonly known as: VITAMIN D3   Oral, Daily      Vitamin D 50 MCG (2000 UT) capsule   2,000 Units, Oral, Daily      fluticasone 50 MCG/ACT nasal spray  Commonly known as: FLONASE   1 spray, Nasal, Daily PRN      hydroCHLOROthiazide 25 MG tablet  Commonly known as: HYDRODIURIL   25 mg, Oral, Daily      levothyroxine 25 MCG tablet  Commonly known as: SYNTHROID, LEVOTHROID   25 mcg, Oral, Daily      lisinopril 10 MG tablet  Commonly known as: PRINIVIL,ZESTRIL   10 mg, Oral, Daily,  Restart today       Loratadine 10 MG capsule   10 mg, Oral, Daily PRN      Potassium Gluconate 550 MG tablet   1 tablet, Oral, Daily      pravastatin 40 MG tablet  Commonly known as: PRAVACHOL   40 mg, Oral, Daily      Vitamin B Complex tablet   1 tablet, Oral, Daily      vitamin B-12 1000 MCG tablet  Commonly known as: CYANOCOBALAMIN   1 tablet, Oral, Daily      vitamin C 500 MG tablet  Commonly known as: ASCORBIC ACID   500 mg, Oral, Daily             No Known Allergies      Discharge Disposition:      Diet:  Hospital:  Diet Order   Procedures   • Diet Regular; GI Soft            CODE STATUS:    Code Status and Medical Interventions:   Ordered at: 06/11/21 1785     Level Of Support Discussed With:    Patient     Code Status:    CPR     Medical Interventions (Level of Support Prior to Arrest):    Full       No future appointments.    Additional Instructions for the Follow-ups that You Need to Schedule     Discharge Follow-up with PCP   As directed       Currently Documented PCP:    Boaz Lorenzo MD    PCP Phone Number:    [phone]     Follow Up Details: in 3-4 days, needs repeat CBC         Discharge Follow-up with Specified Provider: Dr. Reeves; 1 Week   As directed      To: Dr. Reeves    Follow Up: 1 Week    Follow Up Details: need EUS                     Marifer Hightower MD  06/13/21      Time Spent on Discharge:  I spent  37  minutes on this discharge activity which included: face-to-face encounter with the patient,  reviewing the data in the system, coordination of the care with the nursing staff as well as consultants, documentation, and entering orders.            Electronically signed by Ozzie Hightower MD at 06/13/21 1421       Discharge Order (From admission, onward)     Start     Ordered    06/13/21 1416  Discharge patient  Once     Comments: Discharge after transfusion of 1U PRBCs   Expected Discharge Date: 06/13/21    Discharge Disposition: Home or Self Care    Physician of Record for Attribution - Please select from Treatment Team: OZZIE HIGHTOWER [[id number]]    Review needed by CMO to determine Physician of Record: No       Question Answer Comment   Physician of Record for Attribution - Please select from Treatment Team OZZIE HIGHTOWER    Review needed by CMO to determine Physician of Record No        06/13/21 1426

## 2021-06-15 NOTE — TELEPHONE ENCOUNTER
CALLED PATIENT TO LET HER KNOW THAT DR QUEEN REVIEWED HER CT SCAN AND SAID TO SEND PATIENT TO DR THOMAS FOR SURGICAL REMOVAL. PATIENT WANTED TO KNOW IF WE COULD TALK TO DAUGHTER BUT WE DO NOT HAVE RELEASE ON FILE. EXPLAINED THE BEST THING WOULD BE FOR HER DAUGHTER TO COME OVER TO PATIENT HOUSE AND PATIENT CALL BACK. PATIENT SAID SHE WOULD CALL BACK WITH A FAX NUMBER TO SO I CAN FAX A RELEASE. PATIENT ALSO ASKED ABOUT IF THE PATHOLOGY CAME BACK I TOLD HER IT HAD NOT YET.     SENDING REFERRAL TO DR THOMAS OFFICE.

## 2021-06-16 LAB
CYTO UR: NORMAL
LAB AP CASE REPORT: NORMAL
LAB AP CLINICAL INFORMATION: NORMAL
LAB AP DIAGNOSIS COMMENT: NORMAL
LAB AP FLOW CYTOMETRY SUMMARY: NORMAL
PATH REPORT.FINAL DX SPEC: NORMAL
PATH REPORT.GROSS SPEC: NORMAL

## 2021-06-17 ENCOUNTER — TELEPHONE (OUTPATIENT)
Dept: GASTROENTEROLOGY | Facility: CLINIC | Age: 76
End: 2021-06-17

## 2021-06-17 NOTE — TELEPHONE ENCOUNTER
----- Message from Trinity Bishop sent at 6/16/2021  4:06 PM EDT -----  Regarding: FW: pathology results  Can you call this patient with these results from Dr. Gómez and document it?     ----- Message -----  From: Rebecca Gómez MD  Sent: 6/16/2021   3:11 PM EDT  To: Zulema Armstrongler  Subject: RE: pathology results                            The biopsies were benign,.  Just as I had explained to patient that the mass is was behind the mucosa and that is why the biopsies I obtained were not revealing.  The daughter and patient knew that this could be the case.    Please let her know.  Thank you  ----- Message -----  From: Carmela Zulema  Sent: 6/16/2021   2:54 PM EDT  To: Rebecca Gómez MD  Subject: pathology results                                Patient called wanting her pathology results from her egd. I told her that you had not signed off on them yet and would call her when you did. Also wanted to let you know that dr la reviewed and said patient should be sent to dr vo for removal instead of an eus. I have sent the referral to dr vo office yesterday but forgot to let you know what he said. There is communication in the chart from me and dr la on what should be done.     Thank you   Zulema

## 2021-07-07 ENCOUNTER — PRE-ADMISSION TESTING (OUTPATIENT)
Dept: PREADMISSION TESTING | Facility: HOSPITAL | Age: 76
End: 2021-07-07

## 2021-07-07 VITALS — WEIGHT: 134.92 LBS | BODY MASS INDEX: 26.49 KG/M2 | HEIGHT: 60 IN

## 2021-07-07 LAB
ANION GAP SERPL CALCULATED.3IONS-SCNC: 11 MMOL/L (ref 5–15)
BUN SERPL-MCNC: 17 MG/DL (ref 8–23)
BUN/CREAT SERPL: 25 (ref 7–25)
CALCIUM SPEC-SCNC: 10.6 MG/DL (ref 8.6–10.5)
CHLORIDE SERPL-SCNC: 103 MMOL/L (ref 98–107)
CO2 SERPL-SCNC: 26 MMOL/L (ref 22–29)
CREAT SERPL-MCNC: 0.68 MG/DL (ref 0.57–1)
DEPRECATED RDW RBC AUTO: 51.4 FL (ref 37–54)
ERYTHROCYTE [DISTWIDTH] IN BLOOD BY AUTOMATED COUNT: 14.9 % (ref 12.3–15.4)
GFR SERPL CREATININE-BSD FRML MDRD: 84 ML/MIN/1.73
GLUCOSE SERPL-MCNC: 96 MG/DL (ref 65–99)
HBA1C MFR BLD: 4.8 % (ref 4.8–5.6)
HCT VFR BLD AUTO: 31.2 % (ref 34–46.6)
HGB BLD-MCNC: 9.5 G/DL (ref 12–15.9)
MCH RBC QN AUTO: 28.5 PG (ref 26.6–33)
MCHC RBC AUTO-ENTMCNC: 30.4 G/DL (ref 31.5–35.7)
MCV RBC AUTO: 93.7 FL (ref 79–97)
PLATELET # BLD AUTO: 316 10*3/MM3 (ref 140–450)
PMV BLD AUTO: 9.6 FL (ref 6–12)
POTASSIUM SERPL-SCNC: 4.3 MMOL/L (ref 3.5–5.2)
RBC # BLD AUTO: 3.33 10*6/MM3 (ref 3.77–5.28)
SARS-COV-2 RNA PNL SPEC NAA+PROBE: NOT DETECTED
SODIUM SERPL-SCNC: 140 MMOL/L (ref 136–145)
WBC # BLD AUTO: 6.37 10*3/MM3 (ref 3.4–10.8)

## 2021-07-07 PROCEDURE — 80048 BASIC METABOLIC PNL TOTAL CA: CPT

## 2021-07-07 PROCEDURE — 83036 HEMOGLOBIN GLYCOSYLATED A1C: CPT

## 2021-07-07 PROCEDURE — U0004 COV-19 TEST NON-CDC HGH THRU: HCPCS

## 2021-07-07 PROCEDURE — C9803 HOPD COVID-19 SPEC COLLECT: HCPCS

## 2021-07-07 PROCEDURE — 85027 COMPLETE CBC AUTOMATED: CPT

## 2021-07-07 PROCEDURE — 36415 COLL VENOUS BLD VENIPUNCTURE: CPT

## 2021-07-07 RX ORDER — MULTIVITAMIN WITH IRON
1 TABLET ORAL DAILY
COMMUNITY

## 2021-07-07 RX ORDER — MULTIPLE VITAMINS W/ MINERALS TAB 9MG-400MCG
1 TAB ORAL DAILY
COMMUNITY

## 2021-07-07 RX ORDER — LACTOBACILLUS RHAMNOSUS GG 10B CELL
1 CAPSULE ORAL DAILY
COMMUNITY
End: 2021-08-17

## 2021-07-07 RX ORDER — DOXYCYCLINE HYCLATE 50 MG/1
324 CAPSULE, GELATIN COATED ORAL
COMMUNITY
End: 2023-03-07

## 2021-07-07 NOTE — PAT
Per Anesthesia Request, patient instructed not to take their ACE/ARB medications on the AM of surgery.    Patient to apply Chlorhexadine wipes  to surgical area (as instructed) the night before procedure and the AM of procedure. Wipes provided.    EKG IN CHART FROM 6/11/2021. ECHO IN CHART FROM 6/12/21    EKG CLEARED PER DR MCKEON    PATIENT WAS TAKING AMOXICILLIN 500 MG FOR TOOTH INFECTION X 1 WEEK. STARTED TAKING 6/23/21. WOOD PAULSON AT DR MOY'S OFFICE NOTIFIED. NO NEW ORDERS.

## 2021-07-08 ENCOUNTER — ANESTHESIA EVENT (OUTPATIENT)
Dept: PERIOP | Facility: HOSPITAL | Age: 76
End: 2021-07-08

## 2021-07-08 RX ORDER — FAMOTIDINE 20 MG/1
20 TABLET, FILM COATED ORAL ONCE
Status: CANCELLED | OUTPATIENT
Start: 2021-07-08 | End: 2021-07-08

## 2021-07-09 ENCOUNTER — HOSPITAL ENCOUNTER (INPATIENT)
Facility: HOSPITAL | Age: 76
LOS: 3 days | Discharge: HOME | End: 2021-07-12
Attending: SURGERY | Admitting: SURGERY

## 2021-07-09 ENCOUNTER — ANESTHESIA (OUTPATIENT)
Dept: PERIOP | Facility: HOSPITAL | Age: 76
End: 2021-07-09

## 2021-07-09 DIAGNOSIS — K31.89 GASTRIC MASS: ICD-10-CM

## 2021-07-09 LAB
ABO GROUP BLD: NORMAL
BLD GP AB SCN SERPL QL: NEGATIVE
RH BLD: POSITIVE
T&S EXPIRATION DATE: NORMAL

## 2021-07-09 PROCEDURE — 88309 TISSUE EXAM BY PATHOLOGIST: CPT | Performed by: SURGERY

## 2021-07-09 PROCEDURE — 25010000002 ONDANSETRON PER 1 MG: Performed by: NURSE ANESTHETIST, CERTIFIED REGISTERED

## 2021-07-09 PROCEDURE — 25010000002 FENTANYL CITRATE (PF) 50 MCG/ML SOLUTION: Performed by: NURSE ANESTHETIST, CERTIFIED REGISTERED

## 2021-07-09 PROCEDURE — 25010000002 NEOSTIGMINE 10 MG/10ML SOLUTION: Performed by: NURSE ANESTHETIST, CERTIFIED REGISTERED

## 2021-07-09 PROCEDURE — C1889 IMPLANT/INSERT DEVICE, NOC: HCPCS | Performed by: SURGERY

## 2021-07-09 PROCEDURE — 25010000002 PROPOFOL 10 MG/ML EMULSION: Performed by: NURSE ANESTHETIST, CERTIFIED REGISTERED

## 2021-07-09 PROCEDURE — 88341 IMHCHEM/IMCYTCHM EA ADD ANTB: CPT | Performed by: SURGERY

## 2021-07-09 PROCEDURE — 25010000003 CEFAZOLIN IN DEXTROSE 2-4 GM/100ML-% SOLUTION: Performed by: SURGERY

## 2021-07-09 PROCEDURE — 25010000002 ATROPINE PER 0.01 MG: Performed by: NURSE ANESTHETIST, CERTIFIED REGISTERED

## 2021-07-09 PROCEDURE — 25010000002 DEXAMETHASONE PER 1 MG: Performed by: NURSE ANESTHETIST, CERTIFIED REGISTERED

## 2021-07-09 PROCEDURE — 86850 RBC ANTIBODY SCREEN: CPT | Performed by: NURSE ANESTHETIST, CERTIFIED REGISTERED

## 2021-07-09 PROCEDURE — 0BQT4ZZ REPAIR DIAPHRAGM, PERCUTANEOUS ENDOSCOPIC APPROACH: ICD-10-PCS | Performed by: SURGERY

## 2021-07-09 PROCEDURE — 88342 IMHCHEM/IMCYTCHM 1ST ANTB: CPT | Performed by: SURGERY

## 2021-07-09 PROCEDURE — 0DB64ZZ EXCISION OF STOMACH, PERCUTANEOUS ENDOSCOPIC APPROACH: ICD-10-PCS | Performed by: SURGERY

## 2021-07-09 PROCEDURE — 86901 BLOOD TYPING SEROLOGIC RH(D): CPT | Performed by: NURSE ANESTHETIST, CERTIFIED REGISTERED

## 2021-07-09 PROCEDURE — 86900 BLOOD TYPING SEROLOGIC ABO: CPT | Performed by: NURSE ANESTHETIST, CERTIFIED REGISTERED

## 2021-07-09 DEVICE — LIGACLIP 10-M/L, 10MM ENDOSCOPIC ROTATING MULTIPLE CLIP APPLIERS
Type: IMPLANTABLE DEVICE | Site: STOMACH | Status: FUNCTIONAL
Brand: LIGACLIP

## 2021-07-09 DEVICE — ENDOPATH ECHELON ENDOSCOPIC LINEAR CUTTER RELOADS, BLUE, 60MM
Type: IMPLANTABLE DEVICE | Site: STOMACH | Status: FUNCTIONAL
Brand: ECHELON ENDOPATH

## 2021-07-09 RX ORDER — ONDANSETRON 2 MG/ML
4 INJECTION INTRAMUSCULAR; INTRAVENOUS ONCE AS NEEDED
Status: DISCONTINUED | OUTPATIENT
Start: 2021-07-09 | End: 2021-07-09 | Stop reason: HOSPADM

## 2021-07-09 RX ORDER — MAGNESIUM HYDROXIDE 1200 MG/15ML
LIQUID ORAL AS NEEDED
Status: DISCONTINUED | OUTPATIENT
Start: 2021-07-09 | End: 2021-07-09 | Stop reason: HOSPADM

## 2021-07-09 RX ORDER — ONDANSETRON 2 MG/ML
INJECTION INTRAMUSCULAR; INTRAVENOUS AS NEEDED
Status: DISCONTINUED | OUTPATIENT
Start: 2021-07-09 | End: 2021-07-09 | Stop reason: SURG

## 2021-07-09 RX ORDER — HYDROCODONE BITARTRATE AND ACETAMINOPHEN 5; 325 MG/1; MG/1
1 TABLET ORAL ONCE AS NEEDED
Status: DISCONTINUED | OUTPATIENT
Start: 2021-07-09 | End: 2021-07-09 | Stop reason: HOSPADM

## 2021-07-09 RX ORDER — LIDOCAINE HYDROCHLORIDE 10 MG/ML
0.5 INJECTION, SOLUTION EPIDURAL; INFILTRATION; INTRACAUDAL; PERINEURAL ONCE AS NEEDED
Status: COMPLETED | OUTPATIENT
Start: 2021-07-09 | End: 2021-07-09

## 2021-07-09 RX ORDER — SODIUM CHLORIDE 0.9 % (FLUSH) 0.9 %
3-10 SYRINGE (ML) INJECTION AS NEEDED
Status: DISCONTINUED | OUTPATIENT
Start: 2021-07-09 | End: 2021-07-09 | Stop reason: HOSPADM

## 2021-07-09 RX ORDER — BUPIVACAINE HYDROCHLORIDE AND EPINEPHRINE 2.5; 5 MG/ML; UG/ML
INJECTION, SOLUTION EPIDURAL; INFILTRATION; INTRACAUDAL; PERINEURAL AS NEEDED
Status: DISCONTINUED | OUTPATIENT
Start: 2021-07-09 | End: 2021-07-09 | Stop reason: HOSPADM

## 2021-07-09 RX ORDER — DOCUSATE SODIUM 100 MG/1
100 CAPSULE, LIQUID FILLED ORAL 2 TIMES DAILY PRN
Status: DISCONTINUED | OUTPATIENT
Start: 2021-07-09 | End: 2021-07-12 | Stop reason: HOSPADM

## 2021-07-09 RX ORDER — FAMOTIDINE 20 MG/1
20 TABLET, FILM COATED ORAL 2 TIMES DAILY
Status: DISCONTINUED | OUTPATIENT
Start: 2021-07-09 | End: 2021-07-12 | Stop reason: HOSPADM

## 2021-07-09 RX ORDER — ROCURONIUM BROMIDE 10 MG/ML
INJECTION, SOLUTION INTRAVENOUS AS NEEDED
Status: DISCONTINUED | OUTPATIENT
Start: 2021-07-09 | End: 2021-07-09 | Stop reason: SURG

## 2021-07-09 RX ORDER — DEXAMETHASONE SODIUM PHOSPHATE 4 MG/ML
INJECTION, SOLUTION INTRA-ARTICULAR; INTRALESIONAL; INTRAMUSCULAR; INTRAVENOUS; SOFT TISSUE AS NEEDED
Status: DISCONTINUED | OUTPATIENT
Start: 2021-07-09 | End: 2021-07-09 | Stop reason: SURG

## 2021-07-09 RX ORDER — SODIUM CHLORIDE 0.9 % (FLUSH) 0.9 %
10 SYRINGE (ML) INJECTION AS NEEDED
Status: DISCONTINUED | OUTPATIENT
Start: 2021-07-09 | End: 2021-07-09 | Stop reason: HOSPADM

## 2021-07-09 RX ORDER — HYDROMORPHONE HYDROCHLORIDE 1 MG/ML
0.5 INJECTION, SOLUTION INTRAMUSCULAR; INTRAVENOUS; SUBCUTANEOUS
Status: DISCONTINUED | OUTPATIENT
Start: 2021-07-09 | End: 2021-07-09 | Stop reason: HOSPADM

## 2021-07-09 RX ORDER — ONDANSETRON 4 MG/1
4 TABLET, FILM COATED ORAL EVERY 6 HOURS PRN
Status: DISCONTINUED | OUTPATIENT
Start: 2021-07-09 | End: 2021-07-12 | Stop reason: HOSPADM

## 2021-07-09 RX ORDER — FENTANYL CITRATE 50 UG/ML
50 INJECTION, SOLUTION INTRAMUSCULAR; INTRAVENOUS
Status: DISCONTINUED | OUTPATIENT
Start: 2021-07-09 | End: 2021-07-09 | Stop reason: HOSPADM

## 2021-07-09 RX ORDER — LIDOCAINE HYDROCHLORIDE 10 MG/ML
INJECTION, SOLUTION EPIDURAL; INFILTRATION; INTRACAUDAL; PERINEURAL AS NEEDED
Status: DISCONTINUED | OUTPATIENT
Start: 2021-07-09 | End: 2021-07-09 | Stop reason: SURG

## 2021-07-09 RX ORDER — PROMETHAZINE HYDROCHLORIDE 25 MG/1
25 TABLET ORAL ONCE AS NEEDED
Status: DISCONTINUED | OUTPATIENT
Start: 2021-07-09 | End: 2021-07-09 | Stop reason: HOSPADM

## 2021-07-09 RX ORDER — LABETALOL HYDROCHLORIDE 5 MG/ML
5 INJECTION, SOLUTION INTRAVENOUS
Status: DISCONTINUED | OUTPATIENT
Start: 2021-07-09 | End: 2021-07-09 | Stop reason: HOSPADM

## 2021-07-09 RX ORDER — NALOXONE HCL 0.4 MG/ML
0.1 VIAL (ML) INJECTION
Status: DISCONTINUED | OUTPATIENT
Start: 2021-07-09 | End: 2021-07-12 | Stop reason: HOSPADM

## 2021-07-09 RX ORDER — NALOXONE HCL 0.4 MG/ML
0.4 VIAL (ML) INJECTION AS NEEDED
Status: DISCONTINUED | OUTPATIENT
Start: 2021-07-09 | End: 2021-07-09 | Stop reason: HOSPADM

## 2021-07-09 RX ORDER — MIDAZOLAM HYDROCHLORIDE 1 MG/ML
0.5 INJECTION INTRAMUSCULAR; INTRAVENOUS
Status: DISCONTINUED | OUTPATIENT
Start: 2021-07-09 | End: 2021-07-09 | Stop reason: HOSPADM

## 2021-07-09 RX ORDER — HYDROMORPHONE HYDROCHLORIDE 1 MG/ML
0.5 INJECTION, SOLUTION INTRAMUSCULAR; INTRAVENOUS; SUBCUTANEOUS
Status: DISCONTINUED | OUTPATIENT
Start: 2021-07-09 | End: 2021-07-12 | Stop reason: HOSPADM

## 2021-07-09 RX ORDER — DROPERIDOL 2.5 MG/ML
0.62 INJECTION, SOLUTION INTRAMUSCULAR; INTRAVENOUS AS NEEDED
Status: DISCONTINUED | OUTPATIENT
Start: 2021-07-09 | End: 2021-07-09 | Stop reason: HOSPADM

## 2021-07-09 RX ORDER — HYDRALAZINE HYDROCHLORIDE 20 MG/ML
5 INJECTION INTRAMUSCULAR; INTRAVENOUS
Status: DISCONTINUED | OUTPATIENT
Start: 2021-07-09 | End: 2021-07-09 | Stop reason: HOSPADM

## 2021-07-09 RX ORDER — PROPOFOL 10 MG/ML
VIAL (ML) INTRAVENOUS AS NEEDED
Status: DISCONTINUED | OUTPATIENT
Start: 2021-07-09 | End: 2021-07-09 | Stop reason: SURG

## 2021-07-09 RX ORDER — NEOSTIGMINE METHYLSULFATE 1 MG/ML
INJECTION, SOLUTION INTRAVENOUS AS NEEDED
Status: DISCONTINUED | OUTPATIENT
Start: 2021-07-09 | End: 2021-07-09 | Stop reason: SURG

## 2021-07-09 RX ORDER — SODIUM CHLORIDE, SODIUM LACTATE, POTASSIUM CHLORIDE, CALCIUM CHLORIDE 600; 310; 30; 20 MG/100ML; MG/100ML; MG/100ML; MG/100ML
9 INJECTION, SOLUTION INTRAVENOUS CONTINUOUS
Status: DISCONTINUED | OUTPATIENT
Start: 2021-07-09 | End: 2021-07-09

## 2021-07-09 RX ORDER — FAMOTIDINE 10 MG/ML
20 INJECTION, SOLUTION INTRAVENOUS ONCE
Status: COMPLETED | OUTPATIENT
Start: 2021-07-09 | End: 2021-07-09

## 2021-07-09 RX ORDER — ONDANSETRON 2 MG/ML
4 INJECTION INTRAMUSCULAR; INTRAVENOUS EVERY 6 HOURS PRN
Status: DISCONTINUED | OUTPATIENT
Start: 2021-07-09 | End: 2021-07-12 | Stop reason: HOSPADM

## 2021-07-09 RX ORDER — SODIUM CHLORIDE 0.9 % (FLUSH) 0.9 %
3 SYRINGE (ML) INJECTION EVERY 12 HOURS SCHEDULED
Status: DISCONTINUED | OUTPATIENT
Start: 2021-07-09 | End: 2021-07-09 | Stop reason: HOSPADM

## 2021-07-09 RX ORDER — SODIUM CHLORIDE 0.9 % (FLUSH) 0.9 %
10 SYRINGE (ML) INJECTION EVERY 12 HOURS SCHEDULED
Status: DISCONTINUED | OUTPATIENT
Start: 2021-07-09 | End: 2021-07-09 | Stop reason: HOSPADM

## 2021-07-09 RX ORDER — FENTANYL CITRATE 50 UG/ML
INJECTION, SOLUTION INTRAMUSCULAR; INTRAVENOUS AS NEEDED
Status: DISCONTINUED | OUTPATIENT
Start: 2021-07-09 | End: 2021-07-09 | Stop reason: SURG

## 2021-07-09 RX ORDER — PROMETHAZINE HYDROCHLORIDE 25 MG/1
25 SUPPOSITORY RECTAL ONCE AS NEEDED
Status: DISCONTINUED | OUTPATIENT
Start: 2021-07-09 | End: 2021-07-09 | Stop reason: HOSPADM

## 2021-07-09 RX ORDER — DEXTROSE AND SODIUM CHLORIDE 5; .45 G/100ML; G/100ML
125 INJECTION, SOLUTION INTRAVENOUS CONTINUOUS
Status: DISCONTINUED | OUTPATIENT
Start: 2021-07-09 | End: 2021-07-11

## 2021-07-09 RX ORDER — GLYCOPYRROLATE 0.2 MG/ML
INJECTION INTRAMUSCULAR; INTRAVENOUS AS NEEDED
Status: DISCONTINUED | OUTPATIENT
Start: 2021-07-09 | End: 2021-07-09 | Stop reason: SURG

## 2021-07-09 RX ORDER — EPHEDRINE SULFATE 50 MG/ML
INJECTION, SOLUTION INTRAVENOUS AS NEEDED
Status: DISCONTINUED | OUTPATIENT
Start: 2021-07-09 | End: 2021-07-09 | Stop reason: SURG

## 2021-07-09 RX ORDER — DROPERIDOL 2.5 MG/ML
0.62 INJECTION, SOLUTION INTRAMUSCULAR; INTRAVENOUS ONCE AS NEEDED
Status: DISCONTINUED | OUTPATIENT
Start: 2021-07-09 | End: 2021-07-09 | Stop reason: HOSPADM

## 2021-07-09 RX ORDER — IPRATROPIUM BROMIDE AND ALBUTEROL SULFATE 2.5; .5 MG/3ML; MG/3ML
3 SOLUTION RESPIRATORY (INHALATION) ONCE AS NEEDED
Status: DISCONTINUED | OUTPATIENT
Start: 2021-07-09 | End: 2021-07-09 | Stop reason: HOSPADM

## 2021-07-09 RX ORDER — CEFAZOLIN SODIUM 2 G/100ML
2 INJECTION, SOLUTION INTRAVENOUS ONCE
Status: COMPLETED | OUTPATIENT
Start: 2021-07-09 | End: 2021-07-09

## 2021-07-09 RX ORDER — ATROPINE SULFATE 0.4 MG/ML
AMPUL (ML) INJECTION AS NEEDED
Status: DISCONTINUED | OUTPATIENT
Start: 2021-07-09 | End: 2021-07-09 | Stop reason: SURG

## 2021-07-09 RX ADMIN — FENTANYL CITRATE 50 MCG: 50 INJECTION, SOLUTION INTRAMUSCULAR; INTRAVENOUS at 15:53

## 2021-07-09 RX ADMIN — FAMOTIDINE 20 MG: 20 TABLET ORAL at 21:06

## 2021-07-09 RX ADMIN — PROPOFOL 150 MG: 10 INJECTION, EMULSION INTRAVENOUS at 15:53

## 2021-07-09 RX ADMIN — NEOSTIGMINE 3 MG: 1 INJECTION INTRAVENOUS at 17:49

## 2021-07-09 RX ADMIN — PROPOFOL 25 MCG/KG/MIN: 10 INJECTION, EMULSION INTRAVENOUS at 16:00

## 2021-07-09 RX ADMIN — FENTANYL CITRATE 50 MCG: 50 INJECTION, SOLUTION INTRAMUSCULAR; INTRAVENOUS at 16:52

## 2021-07-09 RX ADMIN — ROCURONIUM BROMIDE 30 MG: 10 INJECTION INTRAVENOUS at 15:53

## 2021-07-09 RX ADMIN — GLYCOPYRROLATE 0.4 MG: 0.4 INJECTION INTRAMUSCULAR; INTRAVENOUS at 17:49

## 2021-07-09 RX ADMIN — SODIUM CHLORIDE, POTASSIUM CHLORIDE, SODIUM LACTATE AND CALCIUM CHLORIDE 9 ML/HR: 600; 310; 30; 20 INJECTION, SOLUTION INTRAVENOUS at 11:50

## 2021-07-09 RX ADMIN — DEXAMETHASONE SODIUM PHOSPHATE 4 MG: 4 INJECTION, SOLUTION INTRA-ARTICULAR; INTRALESIONAL; INTRAMUSCULAR; INTRAVENOUS; SOFT TISSUE at 16:12

## 2021-07-09 RX ADMIN — CEFAZOLIN SODIUM 2 G: 10 INJECTION, POWDER, FOR SOLUTION INTRAVENOUS at 15:52

## 2021-07-09 RX ADMIN — LIDOCAINE HYDROCHLORIDE 0.5 ML: 10 INJECTION, SOLUTION EPIDURAL; INFILTRATION; INTRACAUDAL; PERINEURAL at 11:50

## 2021-07-09 RX ADMIN — ONDANSETRON 4 MG: 2 INJECTION INTRAMUSCULAR; INTRAVENOUS at 17:36

## 2021-07-09 RX ADMIN — FAMOTIDINE 20 MG: 10 INJECTION INTRAVENOUS at 12:19

## 2021-07-09 RX ADMIN — FENTANYL CITRATE 100 MCG: 50 INJECTION, SOLUTION INTRAMUSCULAR; INTRAVENOUS at 17:17

## 2021-07-09 RX ADMIN — ATROPINE SULFATE 0.2 MG: 0.4 INJECTION, SOLUTION INTRAMUSCULAR; INTRAVENOUS; SUBCUTANEOUS at 16:16

## 2021-07-09 RX ADMIN — EPHEDRINE SULFATE 15 MG: 50 INJECTION, SOLUTION INTRAVENOUS at 16:16

## 2021-07-09 RX ADMIN — ROCURONIUM BROMIDE 20 MG: 10 INJECTION INTRAVENOUS at 16:38

## 2021-07-09 RX ADMIN — SODIUM CHLORIDE, POTASSIUM CHLORIDE, SODIUM LACTATE AND CALCIUM CHLORIDE: 600; 310; 30; 20 INJECTION, SOLUTION INTRAVENOUS at 17:10

## 2021-07-09 RX ADMIN — DEXTROSE AND SODIUM CHLORIDE 125 ML/HR: 5; 450 INJECTION, SOLUTION INTRAVENOUS at 21:06

## 2021-07-09 RX ADMIN — LIDOCAINE HYDROCHLORIDE 50 MG: 10 INJECTION, SOLUTION EPIDURAL; INFILTRATION; INTRACAUDAL; PERINEURAL at 15:53

## 2021-07-09 RX ADMIN — GLYCOPYRROLATE 0.4 MG: 0.4 INJECTION INTRAMUSCULAR; INTRAVENOUS at 16:13

## 2021-07-09 NOTE — SIGNIFICANT NOTE
Upon arrival to PACU patient has mild crepitus through out left face and bilateral chest. Per report from Dr. Gloria this has improved from while in the OR.

## 2021-07-09 NOTE — BRIEF OP NOTE
PARAESOPHAGEAL HERNIA REPAIR LAPAROSCOPIC  Progress Note    Kade Tillman  7/9/2021    Pre-op Diagnosis:     * Gastric mass      * Hiatal hernia       Post-Op Diagnosis Codes:     * Gastric mass [K31.89]     * Hiatal hernia [K44.9]    Procedure/CPT® Codes:        Procedure(s):  LAPAROSCOPIC REMOVAL OF GASTRIC MASS, LAPAROSCOPIC HIATAL HERNIA REPAIR    Surgeon(s):  Alex Farmer MD Shane, Matthew D., MD    Anesthesia: General    Staff:   Circulator: Judith Jha RN  Scrub Person: Anthony Trivedi Drexel  Nursing Assistant: Jaylin Telles PCT         Estimated Blood Loss: minimal    Urine Voided: * No values recorded between 7/9/2021  3:34 PM and 7/9/2021  5:47 PM *    Specimens:                Specimens     ID Source Type Tests Collected By Collected At Frozen?    A Stomach Tissue · TISSUE PATHOLOGY EXAM   Alex Farmer MD 7/9/21 3360     Description: Stomach mass for permanent    This specimen was not marked as sent.                Drains: * No LDAs found *    Findings: Gastric mass in lesser curvature resected with wedge resection with gross negative margins; hiatal hernia repaired primarily     Complications: none          Alex Farmer MD     Date: 7/9/2021  Time: 17:57 EDT

## 2021-07-09 NOTE — ANESTHESIA PROCEDURE NOTES
Airway  Urgency: elective    Date/Time: 7/9/2021 3:54 PM  Airway not difficult    General Information and Staff    Patient location during procedure: OR    Indications and Patient Condition  Indications for airway management: airway protection    Preoxygenated: yes  Mask difficulty assessment: 1 - vent by mask    Final Airway Details  Final airway type: endotracheal airway      Successful airway: ETT  Cuffed: yes   Successful intubation technique: direct laryngoscopy  Facilitating devices/methods: intubating stylet  Endotracheal tube insertion site: oral  Blade: Kavitha  Blade size: 3  ETT size (mm): 7.0  Cormack-Lehane Classification: grade I - full view of glottis  Placement verified by: chest auscultation and capnometry   Measured from: lips  ETT/EBT  to lips (cm): 20  Number of attempts at approach: 1  Assessment: lips, teeth, and gum same as pre-op and atraumatic intubation

## 2021-07-09 NOTE — OP NOTE
OPERATIVE NOTE    Patient Name:  Kade Tillman  YOB: 1945  [id number]    7/9/2021        PREOPERATIVE DIAGNOSIS: Gastric mass        POSTOPERATIVE DIAGNOSIS: 1.  Gastric mass  2. Hiatal hernia         PROCEDURE PERFORMED:   1. Laparoscopic gastric mass resection   2. Laparoscopic Hiatal Hernia Repair         SURGEON: Alex Farmer MD      ASSISTANT:   Rajeev Pickett MD      SPECIMENS: Gastric mass         ANESTHESIA: General.          FINDINGS:   Gastric mass in lesser curvature resected with wedge resection with gross negative margins; hiatal hernia repaired primarily          INDICATIONS:    Kade Tillman is a very pleasant 75 y.o. female who had a recent episode of upper GI bleeding.  Work-up of this bleeding showed the patient to have a submucosal gastric mass.  Further work-up showed no evidence of metastatic disease.  Discussions were made with the patient about various treatment options and the patient was found to be agreeable to laparoscopic removal of this gastric mass.  Informed consent was obtained         DESCRIPTION OF PROCEDURE:      After obtaining informed consent, the patient was taken to the operating room and placed in supine position. After appropriate DVT and antibiotic prophylaxis, general anesthesia was induced. The abdomen was prepped and draped in standard sterile fashion.  Due to his expertise in laparoscopic gastric surgery Dr. Rajeev Pickett participated in the entire procedure as his expert assistance was needed for the completion of the procedure.  After infiltrating the skin with local anesthetic a 12 mm skin incision was made approximately 10 cm from xiphoid process along the left costal margin. An optically guided trocar was then advanced through the abdominal wall into the abdominal cavity without difficulty. The abdomen was insufflated with carbon dioxide gas to a pressure of 15 mmHg. The laparoscope was then advanced through the trocar and  the abdominal contents inspected. There was no evidence of bowel bladder or visceral injury with entry of the trocar. At this point after infiltrating the appropriate areas with local anesthetic a standard laparoscopic gastric surgery port placement schema was followed. A liver retractor was used to retract the liver anteriorly.     Upon inspection of the stomach the gastric mass was noted in the upper lesser curvature approximately 5 cm distal to the GE junction.  In order to mobilize the stomach for resection, using a combination of electrocautery and ultrasonic dissection the greater curvature of the stomach was mobilized up to the left gage.  During this procedure the patient was noted to have a small hiatal hernia.  The pars flaccida was then divided superior and inferior to the hepatic branch of the vagus nerve which was spared throughout the procedure. The anterior surface of the right gage was then scored and using meticulous blunt dissection a retroesophageal window to the left side was created. A Penrose was placed through this and used to encircle the GE junction.  A circumferential mobilization of the GE junction from the hiatus was performed using a combination of electrocautery ultrasonic and blunt dissection. This dissection was carried up into the mediastinum to the level of the aortic arch. The anterior and posterior vagus nerves were identified and spared throughout the procedure. The right and left pleural spaces were identified and spared throughout the procedure. After complete mobilization the hernia sac was taken off the anterior surface of the stomach using ultrasonic dissection and discarded.    Small vessels along the lesser curvature were ligated using combination of clips and LigaSure.  A 50 Turkish bougie was then placed and placed along the greater curvature away from the area of the mass.  The mass was then removed by performing serial firings of the ANA 60 stapler with blue loads.  The  mass was then placed into an Endo Catch bag for future removal.     A posterior hiatal cruroplasty was then performed using 0 silk sutures. This created a snug closure of the hiatus around the esophagus. The Penrose drain was then discarded.     The liver retractor was then removed. All trocars were then removed under direct and laparoscopic visualization and the abdomen desufflated.     The periumbilical port site incision was then extended as well the underlying fascial defect.  Specimen was then removed the body and sent to pathology as a permanent specimen.  Fascial defect was then reapproximated using interrupted 0 Vicryl suture.    The incisions were then closed using running subcuticular sutures and dressed with dry sterile dressings.     All sponge and needle counts were correct times two at the completion of the procedure. The patient recovered from anesthesia, in stable condition.            Alex Farmer MD  7/9/2021  17:37 EDT

## 2021-07-09 NOTE — ANESTHESIA POSTPROCEDURE EVALUATION
Patient: Kade Tillman    Procedure Summary     Date: 07/09/21 Room / Location:  [facility]    Anesthesia Start: 1535 Anesthesia Stop: 1806    Procedure: LAPAROSCOPIC REMOVAL OF GASTRIC MASS, LAPAROSCOPIC HIATAL HERNIA REPAIR (N/A Abdomen) Diagnosis:       Gastric mass      Hiatal hernia    Surgeons: Alex Farmer MD Provider: Carl Torres MD    Anesthesia Type: general ASA Status: 3          Anesthesia Type: general    Vitals  Vitals Value Taken Time   BP     Temp     Pulse 71 07/09/21 1805   Resp     SpO2 94 % 07/09/21 1805   Vitals shown include unvalidated device data.        Post Anesthesia Care and Evaluation    Patient location during evaluation: PACU  Patient participation: complete - patient participated  Level of consciousness: awake and alert  Pain management: adequate  Airway patency: patent  Anesthetic complications: No anesthetic complications  PONV Status: none  Cardiovascular status: hemodynamically stable and acceptable  Respiratory status: nonlabored ventilation, acceptable and nasal cannula  Hydration status: acceptable

## 2021-07-09 NOTE — ANESTHESIA PROCEDURE NOTES
Airway  Urgency: elective    Date/Time: 7/9/2021 3:54 PM  Airway not difficult    General Information and Staff    Patient location during procedure: OR  CRNA: Alison Quinones CRNA    Indications and Patient Condition  Indications for airway management: airway protection    Preoxygenated: yes  MILS not maintained throughout  Mask difficulty assessment: 1 - vent by mask    Final Airway Details  Final airway type: endotracheal airway      Successful airway: ETT  Cuffed: yes   Successful intubation technique: direct laryngoscopy  Endotracheal tube insertion site: oral  Blade: Kavitha  Blade size: 3  ETT size (mm): 7.0  Cormack-Lehane Classification: grade I - full view of glottis  Placement verified by: chest auscultation and capnometry   Measured from: lips  ETT/EBT  to lips (cm): 21  Number of attempts at approach: 1  Assessment: lips, teeth, and gum same as pre-op and atraumatic intubation    Additional Comments  Negative epigastric sounds, Breath sound equal bilaterally with symmetric chest rise and fall.

## 2021-07-09 NOTE — H&P
Pre-Op H&P  Kade Tillman  [id number]  1945      Chief complaint: Gastric mass      Subjective:  Patient is a 75 y.o.female presents for scheduled surgery by Dr. Farmer. She anticipates a LAPAROSCOPIC REMOVAL OF GASTRIC MASS, POSSIBLE GASTRECTOMY LAPAROSCOPIC today. She had a syncopal episode at home on 6/11/21 and present to [facility] ER. She states while she was checking in she started vomiting blood and had another syncopal episode. She had CT abd showing a large mass along the posterior wall of the body of the stomach concerning for malignancy. While inpatient she received a unit of blood. She had EGD on June 12 that showed a submucosal mass with ulceration at the fundus, hiatal hernia, and Mariann-Worthington tear, there was stigmata of recent bleeding but Mariann-Worthington tear and ulceration.  Biopsies were taken.    Review of Systems:  Constitutional-- No fever, chills or sweats. No fatigue.  CV-- No chest pain, palpitation or syncope. +HTN, HLD, afib  Resp-- No SOB, cough, hemoptysis  Skin--No rashes or lesions      Allergies: No Known Allergies      Home Meds:  Medications Prior to Admission   Medication Sig Dispense Refill Last Dose   • Acetaminophen 500 MG coapsule Take 1 tablet by mouth As Needed for Mild Pain .      • aspirin 81 MG tablet Take 1 tablet by mouth Daily. 30 tablet 11    • B Complex Vitamins (VITAMIN B COMPLEX) tablet Take 1 tablet by mouth daily.      • Calcium 500-125 MG-UNIT tablet Take 1 tablet by mouth daily.      • CALCIUM-VITAMIN D PO Take 2 tablets by mouth Daily. 600MG CALCIUM, VIT D3 800 IU TABS      • Cholecalciferol (VITAMIN D) 2000 units capsule Take 2,000 Units by mouth Daily.      • ferrous gluconate (FERGON) 324 MG tablet Take 324 mg by mouth Daily With Breakfast.      • fluticasone (FLONASE) 50 MCG/ACT nasal spray 1 spray into the nostril(s) as directed by provider Daily As Needed for Rhinitis or Allergies.      • hydrochlorothiazide (HYDRODIURIL) 25 MG tablet Take 25 mg by  mouth daily.      • levothyroxine (SYNTHROID, LEVOTHROID) 25 MCG tablet Take 25 mcg by mouth daily.      • lisinopril (PRINIVIL,ZESTRIL) 10 MG tablet Take 10 mg by mouth Daily.      • Loratadine 10 MG capsule Take 10 mg by mouth daily as needed.      • Magnesium 250 MG tablet Take 1 tablet by mouth Daily.      • multivitamin with minerals (CertaVite Senior) tablet tablet Take 1 tablet by mouth Daily.      • pantoprazole (Protonix) 40 MG EC tablet Take 1 tablet by mouth 2 (two) times a day for 30 days. 60 tablet 0    • [START ON 2021] pantoprazole (PROTONIX) 40 MG EC tablet Take 1 tablet by mouth Daily for 90 days. 30 tablet 2    • Potassium Gluconate 550 MG tablet Take 1 tablet by mouth daily.      • pravastatin (PRAVACHOL) 40 MG tablet Take 40 mg by mouth daily.      • probiotic (CULTURELLE) capsule capsule Take 1 capsule by mouth Daily.      • vitamin B-12 (CYANOCOBALAMIN) 1000 MCG tablet Take 1 tablet by mouth daily.      • vitamin C (ASCORBIC ACID) 500 MG tablet Take 500 mg by mouth daily.            PMH:   Past Medical History:   Diagnosis Date   • A-fib (CMS/HCC)    • Allergic rhinitis    • Anemia    • Anesthesia     VERY HARD TO WAKE UP   • Disease of thyroid gland    • GERD (gastroesophageal reflux disease)    • History of transfusion     NO REACTION   • Hyperlipidemia    • Hypertension    • Hypothyroidism    • Joint pain     LEFT KNEE TORN MENISCUS   • PONV (postoperative nausea and vomiting)    • Postmenopausal    • Torn meniscus      PSH:    Past Surgical History:   Procedure Laterality Date   • APPENDECTOMY     • CARDIAC ELECTROPHYSIOLOGY PROCEDURE N/A 2016    Procedure: PVA;  Surgeon: Brian Chow MD;  Location: [facility] EP INVASIVE LOCATION;  Service:    • CATARACT EXTRACTION     •  SECTION     • COLONOSCOPY     • ENDOSCOPY N/A 2021    Procedure: ESOPHAGOGASTRODUODENOSCOPY;  Surgeon: Rebecca Gómez MD;  Location: [facility] ENDOSCOPY;  Service: Gastroenterology;  Laterality: N/A;    • EYE SURGERY      BILATERAL CATARACTS REMOVED   • HYSTERECTOMY         Immunization History:  Influenza: 2020  Pneumococcal: UTD  Tetanus: Unknown  Covid x2: 2021    Social History:   Tobacco:   Social History     Tobacco Use   Smoking Status Never Smoker   Smokeless Tobacco Never Used      Alcohol:     Social History     Substance and Sexual Activity   Alcohol Use No         Physical Exam:/67 (BP Location: Right arm, Patient Position: Lying)   Pulse 59   Temp 97.4 °F (36.3 °C) (Temporal)   Resp 14   SpO2 96%       General Appearance:    Alert, cooperative, no distress, appears stated age   Head:    Normocephalic, without obvious abnormality, atraumatic   Lungs:     Clear to auscultation bilaterally, respirations unlabored    Heart:   Regular rate and rhythm, S1 and S2 normal    Abdomen:    Soft without tenderness   Extremities:   Extremities normal, atraumatic, no cyanosis or edema   Skin:   Skin color, texture, turgor normal, no rashes or lesions   Neurologic:   Grossly intact     Results Review:     LABS:  Lab Results   Component Value Date    WBC 6.37 07/07/2021    HGB 9.5 (L) 07/07/2021    HCT 31.2 (L) 07/07/2021    MCV 93.7 07/07/2021     07/07/2021    NEUTROABS 7.57 (H) 06/13/2021    GLUCOSE 96 07/07/2021    BUN 17 07/07/2021    CREATININE 0.68 07/07/2021    EGFRIFNONA 84 07/07/2021     07/07/2021    K 4.3 07/07/2021     07/07/2021    CO2 26.0 07/07/2021    MG 2.1 06/11/2021    CALCIUM 10.6 (H) 07/07/2021    ALBUMIN 3.90 06/11/2021    AST 17 06/11/2021    ALT 12 06/11/2021    BILITOT 0.2 06/11/2021       RADIOLOGY:  6/11/21 CT abd:  IMPRESSION:  There is large a mass along the posterior wall of the body of the stomach concerning for malignancy.    I reviewed the patient's new clinical results.    Cancer Staging (if applicable)  Cancer Patient: __ yes __no __unknown; If yes, clinical stage T:__ N:__M:__, stage group or __N/A      Impression: gastric mass      Plan: LAPAROSCOPIC  REMOVAL OF GASTRIC MASS, POSSIBLE GASTRECTOMY LAPAROSCOPIC      Kriss Torres, APRN   7/9/2021   11:50 EDT

## 2021-07-09 NOTE — ANESTHESIA PREPROCEDURE EVALUATION
Anesthesia Evaluation                  Airway   Mallampati: II  Dental      Pulmonary    Cardiovascular     (+) hypertension, dysrhythmias,       Neuro/Psych  GI/Hepatic/Renal/Endo    (+)  GI bleeding ,     Musculoskeletal     Abdominal    Substance History      OB/GYN          Other                        Anesthesia Plan    ASA 3     general     intravenous induction     Anesthetic plan, all risks, benefits, and alternatives have been provided, discussed and informed consent has been obtained with: patient.

## 2021-07-10 ENCOUNTER — APPOINTMENT (OUTPATIENT)
Dept: GENERAL RADIOLOGY | Facility: HOSPITAL | Age: 76
End: 2021-07-10

## 2021-07-10 LAB
ANION GAP SERPL CALCULATED.3IONS-SCNC: 11 MMOL/L (ref 5–15)
BASOPHILS # BLD AUTO: 0.01 10*3/MM3 (ref 0–0.2)
BASOPHILS NFR BLD AUTO: 0.1 % (ref 0–1.5)
BUN SERPL-MCNC: 8 MG/DL (ref 8–23)
BUN/CREAT SERPL: 14.5 (ref 7–25)
BURR CELLS BLD QL SMEAR: NORMAL
CALCIUM SPEC-SCNC: 8.4 MG/DL (ref 8.6–10.5)
CHLORIDE SERPL-SCNC: 101 MMOL/L (ref 98–107)
CO2 SERPL-SCNC: 24 MMOL/L (ref 22–29)
CREAT SERPL-MCNC: 0.55 MG/DL (ref 0.57–1)
DEPRECATED RDW RBC AUTO: 49.4 FL (ref 37–54)
EOSINOPHIL # BLD AUTO: 0 10*3/MM3 (ref 0–0.4)
EOSINOPHIL NFR BLD AUTO: 0 % (ref 0.3–6.2)
ERYTHROCYTE [DISTWIDTH] IN BLOOD BY AUTOMATED COUNT: 14.8 % (ref 12.3–15.4)
GFR SERPL CREATININE-BSD FRML MDRD: 108 ML/MIN/1.73
GLUCOSE SERPL-MCNC: 156 MG/DL (ref 65–99)
HCT VFR BLD AUTO: 26.4 % (ref 34–46.6)
HGB BLD-MCNC: 8.3 G/DL (ref 12–15.9)
HYPOCHROMIA BLD QL: NORMAL
IMM GRANULOCYTES # BLD AUTO: 0.07 10*3/MM3 (ref 0–0.05)
IMM GRANULOCYTES NFR BLD AUTO: 0.7 % (ref 0–0.5)
LYMPHOCYTES # BLD AUTO: 1.09 10*3/MM3 (ref 0.7–3.1)
LYMPHOCYTES NFR BLD AUTO: 10.8 % (ref 19.6–45.3)
MCH RBC QN AUTO: 29.1 PG (ref 26.6–33)
MCHC RBC AUTO-ENTMCNC: 31.4 G/DL (ref 31.5–35.7)
MCV RBC AUTO: 92.6 FL (ref 79–97)
MONOCYTES # BLD AUTO: 0.88 10*3/MM3 (ref 0.1–0.9)
MONOCYTES NFR BLD AUTO: 8.7 % (ref 5–12)
NEUTROPHILS NFR BLD AUTO: 79.7 % (ref 42.7–76)
NEUTROPHILS NFR BLD AUTO: 8.05 10*3/MM3 (ref 1.7–7)
NRBC BLD AUTO-RTO: 0 /100 WBC (ref 0–0.2)
PLAT MORPH BLD: NORMAL
PLATELET # BLD AUTO: 269 10*3/MM3 (ref 140–450)
PMV BLD AUTO: 9.8 FL (ref 6–12)
POTASSIUM SERPL-SCNC: 3.1 MMOL/L (ref 3.5–5.2)
POTASSIUM SERPL-SCNC: 3.7 MMOL/L (ref 3.5–5.2)
RBC # BLD AUTO: 2.85 10*6/MM3 (ref 3.77–5.28)
SODIUM SERPL-SCNC: 136 MMOL/L (ref 136–145)
WBC # BLD AUTO: 10.1 10*3/MM3 (ref 3.4–10.8)
WBC MORPH BLD: NORMAL

## 2021-07-10 PROCEDURE — 74240 X-RAY XM UPR GI TRC 1CNTRST: CPT

## 2021-07-10 PROCEDURE — 80048 BASIC METABOLIC PNL TOTAL CA: CPT | Performed by: SURGERY

## 2021-07-10 PROCEDURE — 94799 UNLISTED PULMONARY SVC/PX: CPT

## 2021-07-10 PROCEDURE — 84132 ASSAY OF SERUM POTASSIUM: CPT | Performed by: SURGERY

## 2021-07-10 PROCEDURE — 25010000003 POTASSIUM CHLORIDE 10 MEQ/100ML SOLUTION: Performed by: SURGERY

## 2021-07-10 PROCEDURE — 85007 BL SMEAR W/DIFF WBC COUNT: CPT | Performed by: SURGERY

## 2021-07-10 PROCEDURE — 85025 COMPLETE CBC W/AUTO DIFF WBC: CPT | Performed by: SURGERY

## 2021-07-10 RX ORDER — POTASSIUM CHLORIDE 750 MG/1
40 CAPSULE, EXTENDED RELEASE ORAL AS NEEDED
Status: DISCONTINUED | OUTPATIENT
Start: 2021-07-10 | End: 2021-07-12 | Stop reason: HOSPADM

## 2021-07-10 RX ORDER — POTASSIUM CHLORIDE 7.45 MG/ML
10 INJECTION INTRAVENOUS
Status: DISCONTINUED | OUTPATIENT
Start: 2021-07-10 | End: 2021-07-12 | Stop reason: HOSPADM

## 2021-07-10 RX ORDER — POTASSIUM CHLORIDE 1.5 G/1.77G
40 POWDER, FOR SOLUTION ORAL AS NEEDED
Status: DISCONTINUED | OUTPATIENT
Start: 2021-07-10 | End: 2021-07-12 | Stop reason: HOSPADM

## 2021-07-10 RX ADMIN — POTASSIUM CHLORIDE 40 MEQ: 750 CAPSULE, EXTENDED RELEASE ORAL at 15:45

## 2021-07-10 RX ADMIN — DEXTROSE AND SODIUM CHLORIDE 125 ML/HR: 5; 450 INJECTION, SOLUTION INTRAVENOUS at 15:45

## 2021-07-10 RX ADMIN — POTASSIUM CHLORIDE 10 MEQ: 7.46 INJECTION, SOLUTION INTRAVENOUS at 08:57

## 2021-07-10 RX ADMIN — DEXTROSE AND SODIUM CHLORIDE 125 ML/HR: 5; 450 INJECTION, SOLUTION INTRAVENOUS at 08:57

## 2021-07-10 RX ADMIN — FAMOTIDINE 20 MG: 20 TABLET ORAL at 20:42

## 2021-07-10 RX ADMIN — POTASSIUM CHLORIDE 10 MEQ: 7.46 INJECTION, SOLUTION INTRAVENOUS at 10:07

## 2021-07-10 RX ADMIN — POTASSIUM CHLORIDE 40 MEQ: 750 CAPSULE, EXTENDED RELEASE ORAL at 12:00

## 2021-07-10 NOTE — PLAN OF CARE
Goal Outcome Evaluation:  Plan of Care Reviewed With: patient        Progress: improving  Outcome Summary: VSS. On room air. Crepitus improved. Walking in room. Tolearting clears after UGI showed no leak. Hopes to go home soon. K+ replaced.

## 2021-07-10 NOTE — PLAN OF CARE
Problem: Adult Inpatient Plan of Care  Goal: Plan of Care Review  Outcome: Ongoing, Progressing  Flowsheets  Taken 7/10/2021 0305 by Navneet Hernandez, RN  Progress: improving  Outcome Summary: VSS. Pt doing very well. C02 continuous monitoring, WDL. O2 sats good. UOP adq. Alert and oriented. No complaints other than pain from crepitus. This is mild in patients shoulders, chest, cheeks and R lower eye lid. Patient refusing narcotic for this pain. Surgical pain is minimal per pt. Continue to monitor.  Taken 7/9/2021 1629 by Judith Jha, RN  Plan of Care Reviewed With: patient   Goal Outcome Evaluation:           Progress: improving  Outcome Summary: VSS. Pt doing very well. C02 continuous monitoring, WDL. O2 sats good. UOP adq. Alert and oriented. No complaints other than pain from crepitus. This is mild in patients shoulders, chest, cheeks and R lower eye lid. Patient refusing narcotic for this pain. Surgical pain is minimal per pt. Continue to monitor.

## 2021-07-10 NOTE — PROGRESS NOTES
"Patient Name:  Kade Tillman  YOB: 1945  [id number]    Surgery Progress Note    Date of visit: 7/10/2021    Subjective   No acute events overnight. Had some crepitus that is now almost completely resolved. No abdominal pain. No nausea/vomiting. No fevers/chills.          Objective       /64 (BP Location: Right arm, Patient Position: Lying)   Pulse 70   Temp 98 °F (36.7 °C) (Oral)   Resp 18   Ht 152.4 cm (60\")   Wt 61.2 kg (134 lb 14.7 oz)   SpO2 96%   BMI 26.35 kg/m²     Intake/Output Summary (Last 24 hours) at 7/10/2021 0929  Last data filed at 7/10/2021 0857  Gross per 24 hour   Intake 3463 ml   Output 1500 ml   Net 1963 ml       CV:  Rhythm regular and rate regular  L:  Clear to auscultation bilaterally  Abd:  Bowel sounds positive, soft, dressings c/d/i, appropriately tender  Ext:  No cyanosis, clubbing, edema    Recent labs and imaging that are back at this time have been reviewed.   WBCs 10.1  Hgb 8.3  Cr 0.55       Assessment/Plan     Pt POD#1 lap gastric wedge resection/hiatal hernia repair  Doing well  UGI to assess for leak/gastric emptying -> if OK will start clear liquid diet  Pain control  OOB, IS, DVT prlx        Alex Farmer MD  7/10/2021  09:29 EDT      "

## 2021-07-11 RX ADMIN — FAMOTIDINE 20 MG: 20 TABLET ORAL at 07:30

## 2021-07-11 RX ADMIN — DEXTROSE AND SODIUM CHLORIDE 125 ML/HR: 5; 450 INJECTION, SOLUTION INTRAVENOUS at 07:30

## 2021-07-11 RX ADMIN — FAMOTIDINE 20 MG: 20 TABLET ORAL at 20:13

## 2021-07-11 NOTE — PLAN OF CARE
Problem: Adult Inpatient Plan of Care  Goal: Plan of Care Review  Outcome: Ongoing, Progressing  Flowsheets  Taken 7/11/2021 1549 by Jeni Lau, RN  Progress: improving  Outcome Summary: VSS, no complaints of pain, AxO, RA. Up adlib. Diet advanced to Full liquid, IVF locked. Using IS, walking halls. Continue to monitor.  Taken 7/10/2021 1739 by Chari Wise RN  Plan of Care Reviewed With: patient  Goal: Patient-Specific Goal (Individualized)  Outcome: Ongoing, Progressing  Goal: Absence of Hospital-Acquired Illness or Injury  Outcome: Ongoing, Progressing  Intervention: Identify and Manage Fall Risk  Recent Flowsheet Documentation  Taken 7/11/2021 1400 by Jeni Lau RN  Safety Promotion/Fall Prevention:   activity supervised   assistive device/personal items within reach   clutter free environment maintained   fall prevention program maintained   mobility aid in reach   muscle strengthening facilitated   nonskid shoes/slippers when out of bed   room organization consistent   safety round/check completed   toileting scheduled  Taken 7/11/2021 1200 by Jeni Lau RN  Safety Promotion/Fall Prevention:   activity supervised   assistive device/personal items within reach   clutter free environment maintained   fall prevention program maintained   mobility aid in reach   muscle strengthening facilitated   nonskid shoes/slippers when out of bed   room organization consistent   safety round/check completed   toileting scheduled  Taken 7/11/2021 1000 by Jeni Lau RN  Safety Promotion/Fall Prevention:   activity supervised   assistive device/personal items within reach   clutter free environment maintained   fall prevention program maintained   mobility aid in reach   muscle strengthening facilitated   nonskid shoes/slippers when out of bed   room organization consistent   safety round/check completed   toileting scheduled  Taken 7/11/2021 0800 by Jeni Lau, RN  Safety Promotion/Fall Prevention:    activity supervised   assistive device/personal items within reach   clutter free environment maintained   fall prevention program maintained   mobility aid in reach   muscle strengthening facilitated   nonskid shoes/slippers when out of bed   safety round/check completed   room organization consistent   toileting scheduled  Intervention: Prevent and Manage VTE (venous thromboembolism) Risk  Recent Flowsheet Documentation  Taken 7/11/2021 0800 by Jeni Lau, RN  VTE Prevention/Management:   bilateral   dorsiflexion/plantar flexion performed   bleeding risk factor(s) identified  Goal: Optimal Comfort and Wellbeing  Outcome: Ongoing, Progressing  Intervention: Provide Person-Centered Care  Recent Flowsheet Documentation  Taken 7/11/2021 0800 by Jeni Lau, RN  Trust Relationship/Rapport:   care explained   choices provided  Goal: Readiness for Transition of Care  Outcome: Ongoing, Progressing   Goal Outcome Evaluation:           Progress: improving  Outcome Summary: VSS, no complaints of pain, AxO, RA. Up adlib. Diet advanced to Full liquid, IVF locked. Using IS, walking halls. Continue to monitor.

## 2021-07-11 NOTE — PLAN OF CARE
Problem: Adult Inpatient Plan of Care  Goal: Plan of Care Review  Outcome: Ongoing, Progressing  Flowsheets  Taken 7/11/2021 0130 by Navneet Hernandez, RN  Outcome Summary: VSS. Resting well. On room air. Crepitus improved, up ad nathalia. Tolerating diet. No pain. UOP adq. Continue to monitor.  Taken 7/10/2021 1739 by Chari Wise RN  Progress: improving  Plan of Care Reviewed With: patient   Goal Outcome Evaluation:           Progress: improving  Outcome Summary: VSS. Resting well. On room air. Crepitus improved, up ad nathalia. Tolerating diet. No pain. UOP adq. Continue to monitor.

## 2021-07-11 NOTE — PROGRESS NOTES
"Patient Name:  Kade Tillman  YOB: 1945  [id number]    Surgery Progress Note    Date of visit: 7/11/2021    Subjective   No acute events overnight. Pain controlled. No nausea/vomiting. Tolerated clear liquid diet without increased pain. No fevers/chills.         Objective       /74 (BP Location: Right arm, Patient Position: Lying)   Pulse 67   Temp 97.5 °F (36.4 °C) (Oral)   Resp 18   Ht 152.4 cm (60\")   Wt 61.2 kg (134 lb 14.7 oz)   SpO2 93%   BMI 26.35 kg/m²     Intake/Output Summary (Last 24 hours) at 7/11/2021 1215  Last data filed at 7/11/2021 0700  Gross per 24 hour   Intake 1105 ml   Output 3700 ml   Net -2595 ml       CV:  Rhythm regular and rate regular  L:  Clear to auscultation bilaterally  Abd:  Bowel sounds positive, soft, appropriately tender, inc c/d/i  Ext:  No cyanosis, clubbing, edema    Recent labs and imaging that are back at this time have been reviewed.        Assessment/Plan     Pt POD#2 lap gastric wedge resection/hiatal hernia repair  Doing well  Adv to full liquids, HLIV  Pain control  OOB, IS, DVT prlx        Alex Farmer MD  7/11/2021  12:15 EDT      "

## 2021-07-12 VITALS
DIASTOLIC BLOOD PRESSURE: 71 MMHG | RESPIRATION RATE: 17 BRPM | SYSTOLIC BLOOD PRESSURE: 151 MMHG | BODY MASS INDEX: 26.49 KG/M2 | TEMPERATURE: 97.9 F | OXYGEN SATURATION: 93 % | WEIGHT: 134.92 LBS | HEIGHT: 60 IN | HEART RATE: 54 BPM

## 2021-07-12 RX ORDER — PSEUDOEPHEDRINE HCL 30 MG
100 TABLET ORAL 2 TIMES DAILY PRN
Qty: 30 CAPSULE | Refills: 0 | Status: SHIPPED | OUTPATIENT
Start: 2021-07-12 | End: 2023-03-07

## 2021-07-12 RX ORDER — OXYCODONE HYDROCHLORIDE AND ACETAMINOPHEN 5; 325 MG/1; MG/1
1-2 TABLET ORAL EVERY 4 HOURS PRN
Qty: 10 TABLET | Refills: 0 | Status: SHIPPED | OUTPATIENT
Start: 2021-07-12 | End: 2021-08-17

## 2021-07-12 RX ADMIN — FAMOTIDINE 20 MG: 20 TABLET ORAL at 08:44

## 2021-07-12 NOTE — PLAN OF CARE
Goal Outcome Evaluation:  Plan of Care Reviewed With: patient        Progress: improving  Outcome Summary: pt rested well, no c/o , abmulated in hallway

## 2021-07-12 NOTE — PROGRESS NOTES
"Patient Name:  Kade Tillman  YOB: 1945  [id number]    Surgery Progress Note    Date of visit: 7/12/2021    Subjective   No acute events overnight. Pain minimal. No nausea/vomiting. No fevers/chills. Tolerated full liquids without issue.         Objective       /71 (BP Location: Right arm, Patient Position: Lying)   Pulse 54   Temp 97.9 °F (36.6 °C) (Oral)   Resp 17   Ht 152.4 cm (60\")   Wt 61.2 kg (134 lb 14.7 oz)   SpO2 93%   BMI 26.35 kg/m²     Intake/Output Summary (Last 24 hours) at 7/12/2021 0817  Last data filed at 7/12/2021 0700  Gross per 24 hour   Intake --   Output 1850 ml   Net -1850 ml       CV:  Rhythm regular and rate regular  L:  Clear to auscultation bilaterally  Abd:  Bowel sounds positive, soft, inc c/d/i, minimal tenderness  Ext:  No cyanosis, clubbing, edema    Recent labs and imaging that are back at this time have been reviewed.          Assessment/Plan     Pt POD#3 lap gastric wedge resection/hiatal hernia repair  Doing well  Adv to GI soft diet  Pain control  OOB, IS, DVT prlx  DC planning if tolerates new diet      Alex Farmer MD  7/12/2021  08:17 EDT      "

## 2021-07-13 NOTE — DISCHARGE SUMMARY
Patient Name:  Kade Tillman  YOB: 1945  [id number]    Date of Discharge:  7/12/2021    Discharge Diagnosis: 1. Gastric mass  2. Hiatal hernia    Presenting Problem/History of Present Illness  Active Hospital Problems    Diagnosis  POA   • Gastric mass [K31.89]  Yes      Resolved Hospital Problems   No resolved problems to display.        Hospital Course  Patient is a 75 y.o. female presented with a gastric mass.  On 7/9 she underwent a laparoscopic resection of gastric mass and hiatal hernia repair. On postoperative day 1, she had an upper GI study showing no leak of the stomach and appropriate gastric emptying. She was started on clear liquid diet which she tolerated. On postoperative day 2, she was advanced to a full liquid diet which she tolerated. On postoperative day 3, she was advanced to a GI soft diet which she tolerated. On this day her pain was minimal, she had no fevers, and was voiding and ambulating appropriately. She was thus found to be safe for discharge to home.     Procedures Performed    Procedure(s):  LAPAROSCOPIC REMOVAL OF GASTRIC MASS, LAPAROSCOPIC HIATAL HERNIA REPAIR  -------------------       Consults:   Consults     Date and Time Order Name Status Description    6/11/2021 11:40 PM Inpatient Gastroenterology Consult Completed           Pertinent Test Results: N/A    Condition on Discharge:  Pain controlled with oral pain medication, tolerating diet, ambulating appropriately      Vital Signs       Physical Exam:     General Appearance:    Alert, cooperative, in no acute distress   Head:    Normocephalic, without obvious abnormality, atraumatic   Eyes:            Lids and lashes normal, conjunctivae and sclerae normal, no   icterus, no pallor, corneas clear, PERRLA   Ears:    Ears appear intact with no abnormalities noted   Throat:   No oral lesions, no thrush, oral mucosa moist   Neck:   No adenopathy, supple, trachea midline, no thyromegaly, no     carotid bruit, no  JVD   Back:     No kyphosis present, no scoliosis present, no skin lesions,       erythema or scars, no tenderness to percussion or                   palpation,   range of motion normal   Lungs:     Clear to auscultation,respirations regular, even and                   unlabored    Heart:    Regular rhythm and normal rate, normal S1 and S2, no            murmur, no gallop, no rub, no click   Breast Exam:    Deferred   Abdomen:     Normal bowel sounds, no masses, no organomegaly, soft        non-tender, non-distended, no guarding, no rebound                 Tenderness, incisions clean/dry/intact   Genitalia:    Deferred   Extremities:   Moves all extremities well, no edema, no cyanosis, no              redness   Pulses:   Pulses palpable and equal bilaterally   Skin:   No bleeding, bruising or rash   Lymph nodes:   No palpable adenopathy   Neurologic:   Cranial nerves 2 - 12 grossly intact, sensation intact, DTR        present and equal bilaterally       Discharge Disposition  Home or Self Care    Discharge Medications     Discharge Medications      New Medications      Instructions Start Date   docusate sodium 100 MG capsule   100 mg, Oral, 2 Times Daily PRN      oxyCODONE-acetaminophen 5-325 MG per tablet  Commonly known as: PERCOCET   1-2 tablets, Oral, Every 4 Hours PRN         Continue These Medications      Instructions Start Date   Acetaminophen 500 MG capsule   1 tablet, Oral, As Needed      aspirin 81 MG tablet   81 mg, Oral, Daily      CALCIUM-VITAMIN D PO   2 tablets, Oral, Daily, 600MG CALCIUM, VIT D3 800 IU TABS       Certa[employer]ite Senior tablet tablet   1 tablet, Oral, Daily      ferrous gluconate 324 MG tablet  Commonly known as: FERGON   324 mg, Oral, Daily With Breakfast      fluticasone 50 MCG/ACT nasal spray  Commonly known as: FLONASE   1 spray, Nasal, Daily PRN      hydroCHLOROthiazide 25 MG tablet  Commonly known as: HYDRODIURIL   25 mg, Oral, Daily      levothyroxine 25 MCG tablet  Commonly known  as: SYNTHROID, LEVOTHROID   25 mcg, Oral, Daily      lisinopril 10 MG tablet  Commonly known as: PRINIVIL,ZESTRIL   10 mg, Oral, Daily      Loratadine 10 MG capsule   10 mg, Oral, Daily PRN      Magnesium 250 MG tablet   1 tablet, Oral, Daily      pantoprazole 40 MG EC tablet  Commonly known as: PROTONIX   40 mg, Oral, Daily   Start Date: July 14, 2021     pravastatin 40 MG tablet  Commonly known as: PRAVACHOL   40 mg, Oral, Daily      probiotic capsule capsule   1 capsule, Oral, Daily      Vitamin B Complex tablet   1 tablet, Oral, Daily      vitamin C 500 MG tablet  Commonly known as: ASCORBIC ACID   500 mg, Oral, Daily             Discharge Diet:     Activity at Discharge:   Activity Instructions     Discharge Activity      1) No driving until no longer taking narcotics.   2) Return to school / work in 1 week.  3) May shower. No tub baths. No swimming.  4) Do not lift / push / pull more than 15 lbs.          Follow-up Appointments  No future appointments.  Additional Instructions for the Follow-ups that You Need to Schedule     Discharge Follow-up with Specified Provider: Dr. Farmer's office; 2 Weeks   As directed      To: Dr. Farmer's office    Follow Up: 2 Weeks    Follow Up Details: Call [phone] to make an appointment         Notify Physician or Go To The ED For the Following Conditions   As directed      Fever >100.4, increased abdominal pain, persistent nausea/vomiting, new redness/drainage from wounds    Order Comments: Fever >100.4, increased abdominal pain, persistent nausea/vomiting, new redness/drainage from wounds                Test Results Pending at Discharge  Pending Labs     Order Current Status    Tissue Pathology Exam In process           Alex Farmer MD   07/13/21  13:27 EDT    Time: Discharge 15 min

## 2021-07-14 LAB
CYTO UR: NORMAL
LAB AP CASE REPORT: NORMAL
LAB AP CLINICAL INFORMATION: NORMAL
LAB AP DIAGNOSIS COMMENT: NORMAL
LAB AP SPECIAL STAINS: NORMAL
PATH REPORT.FINAL DX SPEC: NORMAL
PATH REPORT.GROSS SPEC: NORMAL

## 2021-08-16 ENCOUNTER — TELEPHONE (OUTPATIENT)
Dept: ONCOLOGY | Facility: CLINIC | Age: 76
End: 2021-08-16

## 2021-08-16 NOTE — TELEPHONE ENCOUNTER
PATIENTS DAUGHTER CALLED      CHERI STORY,       MOTHER WILL BE NEW PT SEEN AT [facility], FILLING OUT PAPER WORK WANTED TO KNOW IF ALL NEEDED TO BE FILLED OUT        CALLED CHECKED WITH  ADVISED WILL ALL NEED TO BE COMPLETED HOWEVER IF SOMETHING DOES NOT APPLY CAN PUT N/A  AS WELL.      I ADVISED THIS TO CHERI. SHE V/U.

## 2021-08-17 ENCOUNTER — CONSULT (OUTPATIENT)
Dept: ONCOLOGY | Facility: CLINIC | Age: 76
End: 2021-08-17

## 2021-08-17 VITALS
BODY MASS INDEX: 26.5 KG/M2 | WEIGHT: 135 LBS | SYSTOLIC BLOOD PRESSURE: 145 MMHG | DIASTOLIC BLOOD PRESSURE: 66 MMHG | HEIGHT: 60 IN | TEMPERATURE: 97.7 F | HEART RATE: 64 BPM | RESPIRATION RATE: 20 BRPM

## 2021-08-17 DIAGNOSIS — C49.A0 GASTROINTESTINAL STROMAL TUMOR (GIST) (HCC): Primary | ICD-10-CM

## 2021-08-17 PROCEDURE — 99205 OFFICE O/P NEW HI 60 MIN: CPT | Performed by: INTERNAL MEDICINE

## 2021-08-17 NOTE — PROGRESS NOTES
CHIEF COMPLAINT: Gastric GI stromal tumor    REASON FOR REFERRAL: Gastric GI stromal tumor      RECORDS OBTAINED  Records of the patients history including those obtained from hospital records were reviewed and summarized in detail.    Oncology/Hematology History Overview Note   1.  GI stromal tumor  2.  Paroxysmal atrial fibrillation found on heart monitor during colonoscopy 2015 previously diagnosed 2003 with spontaneous conversion previously.  Previously on Coumadin for stroke prophylaxis for 1 year and then discontinued.  Ablated 7/13/2016 Dr. Chow  3.  Hypertension  4.  Sigmoid hyperplastic polyp 11/11/2015 with Dr. Armando Wallis  5.  Hyperlipidemia  6.  Hypothyroidism  7.  History of ASHANTI/BSO    Oncology history timeline:  -6/11/2021 presented to emergency room with syncope and hematemesis.  No abdominal pain diarrhea or constipation.  No melena or hematochezia.  Has been taking low-dose aspirin.  No history of GI bleeding.  Prior colonoscopy 5 years previous.  Fully vaccinated for COVID-19.  CT abdomen pelvis showed large mass posterior body wall stomach 4.4 x 4.3 cm with liver, spleen, and pancreas otherwise unremarkable.  Hemoglobin 10.9 white count 17,200 platelets 224,000 otherwise unremarkable differential.  CMP shows normal liver enzymes and creatinine.  -6/12/2021 EGD Dr. Rebecca Gómez showed submucosal mass with ulceration at the fundus and a Mariann-Worthington tear with hiatal hernia.  Plans were made for follow-up on biopsy and outpatient EUS.  Pathology showed benign gastric epithelium with foveolar hyperplasia negative for metaplasia or dysplasia or malignancy or H. pylori.  CT head without contrast negative  -6/13/2021 hemoglobin 6.9  -7/7/2021 hemoglobin 9.5  -7/9/2021 admitted by Dr. Dr. Farmer for laparoscopic removal of gastric mass and possible gastrectomy laparoscopically.  Upon inspection of the stomach, gastric mass was in the upper lesser curvature 5 cm distal to the GE junction.  Pathology  "showed spindle cell type gastrointestinal stromal tumor, 4.2 cm maximum dimension, 2 mitoses per 5 mm², grade 1, low-grade, margins clear.  Positive for  and CD34.  Negative for S100 and desmin.  -7/10/2021 hemoglobin 8.3 with normochromic normocytic indices and unremarkable differential.  -7/13/2021 discharged from hospital.  -7/26/2021 follow-up with Dr. Farmer as outpatient.  No postoperative issues.  With healing well.    -8/17/2021 [facility] medical oncology consultation: I reviewed Dr. Farmer's excellent care.  All tumors over 2 cm in size should be excised as was this case.  These tumors can be isodense on CT.  Hepatic metastases can be better visualized either with PET CT or MRI.  With the tumor over 2 cm in size, I will get completion staging with a CT of the chest with postoperative restaging CT abdomen pelvis to reestablish anatomy postoperatively and I think as explained below the overall risk testis is well enough to not put her through the PET.  Risk of progression free survival for a tumor 2-5 cm with less than 5 mitoses per 50 high-power fields arising from the stomach has a 98.1% rate of progression free survival.  NIH risk stratification for tumor of the size with less than 5 mitoses per 50 high-power fields would be considered a low risk for metastasis or recurrence regardless of primarysite, left ocular factors.  AJCC staging for gastric GIST's would categorize this stage Ia for tumor less than 5 cm with a low mitotic rate and observed rate of progressive disease from 0-2%.  [facility] GIST nomogram predicts a 91% 5-year probability of recurrence free survival after surgery alone, 96% at 2 years.  Though technically, adjuvant Gleevec has been approved for any tumor over 3 cm in size, there is no clear consistent consensus amongst expert groups as to what cutoff might constitute the lowest \"acceptable\" level of risk for metastasis or recurrence that would justify the use of " adjuvant imatinib and it is not clear which one prognostic tool performed another.  Whether the results of molecular testing should be integrated into risk stratification is also unclear there is no data showing benefit from adjuvant Gleevec with positive risk stratification based on SDH, NF PDGFR alpha.  Gleevec is generally fairly well-tolerated and while the general recommendation for adjuvant therapy for tumors over 3 cm in size, without further risk stratification this is a highly heterogeneous population based on mitotic index, primary site, molecular factors.  On balance, at age 75 with other comorbidities and the overall predicted low risk of metastasis with gastric primary under 5 cm with low mitotic rate, I would not recommend adjuvant imatinib.  There are no evidence-based guidelines for follow-up of completely resected GIST tumors.  General recommendation is for history and physical every 3 to 6 months for 5 years and then annually with a CT recommended every 3 to 6 months for 3 to 5 years and then annually as there can be late recurrences.  Other offers have recommended that for low risk cases, every other year scan seems reasonable.  Further that may arise in the future as more data is collected.  For now I will have her get completion staging CT chest with postoperative restaging/baseline CT abdomen pelvis and after that probably repeated in 6 months and then a year after that and then every other year from that point forward until she is old enough that she would not want us to intervene.  Endoscopic follow-up is not routine unless symptoms dictate.  We will follow up on her anemia as well.     Gastrointestinal stromal tumor (GIST) (CMS/HCC)   8/17/2021 Cancer Staged    Staging form: Gastrointestinal Stromal Tumor - Gastric And Omental Gist, AJCC 8th Edition  - Clinical: Stage IA (cT2, cN0, cM0, Mitotic Rate: Low) - Signed by Gus Arias MD on 8/17/2021         HISTORY OF PRESENT ILLNESS:  The  patient is a 75 y.o.  female, referred for gastric GI stromal tumor history as above.  Doing well postop.    REVIEW OF SYSTEMS:  No new somatic complaints or pertinent to today    Past Medical History:   Diagnosis Date   • A-fib (CMS/HCC)    • Allergic rhinitis    • Anemia    • Delayed emergence from anesthesia    • Disease of thyroid gland    • GERD (gastroesophageal reflux disease)    • History of transfusion     NO REACTION   • Hyperlipidemia    • Hypertension    • Hypothyroidism    • PONV (postoperative nausea and vomiting)    • Postmenopausal    • Torn meniscus      Past Surgical History:   Procedure Laterality Date   • APPENDECTOMY     • CARDIAC ELECTROPHYSIOLOGY PROCEDURE N/A 2016    Procedure: PVA;  Surgeon: Brian Chow MD;  Location:  [facility];  Service:    • CATARACT EXTRACTION     • CATARACT EXTRACTION     • CATARACT EXTRACTION     •  SECTION     • COLONOSCOPY     • ENDOSCOPY N/A 2021    Procedure: ESOPHAGOGASTRODUODENOSCOPY;  Surgeon: Rebecca Gómez MD;  Location:  [facility];  Service: Gastroenterology;  Laterality: N/A;   • EYE SURGERY      BILATERAL CATARACTS REMOVED   • HYSTERECTOMY     • PARAESOPHAGEAL HERNIA REPAIR N/A 2021    Procedure: LAPAROSCOPIC REMOVAL OF GASTRIC MASS, LAPAROSCOPIC HIATAL HERNIA REPAIR;  Surgeon: Alex Farmer MD;  Location:  [facility];  Service: General;  Laterality: N/A;       Current Outpatient Medications on File Prior to Visit   Medication Sig Dispense Refill   • Acetaminophen 500 MG coapsule Take 1 tablet by mouth As Needed for Mild Pain .     • aspirin 81 MG tablet Take 1 tablet by mouth Daily. 30 tablet 11   • B Complex Vitamins (VITAMIN B COMPLEX) tablet Take 1 tablet by mouth daily.     • CALCIUM-VITAMIN D PO Take 2 tablets by mouth Daily. 600MG CALCIUM, VIT D3 800 IU TABS     • docusate sodium 100 MG capsule Take 1 capsule by mouth 2 (Two) Times a Day As Needed for Constipation. 30 capsule 0   • ferrous  gluconate (FERGON) 324 MG tablet Take 324 mg by mouth Daily With Breakfast.     • fluticasone (FLONASE) 50 MCG/ACT nasal spray 1 spray into the nostril(s) as directed by provider Daily As Needed for Rhinitis or Allergies.     • hydrochlorothiazide (HYDRODIURIL) 25 MG tablet Take 25 mg by mouth daily.     • levothyroxine (SYNTHROID, LEVOTHROID) 25 MCG tablet Take 25 mcg by mouth daily.     • lisinopril (PRINIVIL,ZESTRIL) 10 MG tablet Take 10 mg by mouth Daily.     • Loratadine 10 MG capsule Take 10 mg by mouth daily as needed.     • Magnesium 250 MG tablet Take 1 tablet by mouth Daily.     • multivitamin with minerals (CertaVite Senior) tablet tablet Take 1 tablet by mouth Daily.     • pantoprazole (PROTONIX) 40 MG EC tablet Take 1 tablet by mouth Daily for 90 days. 30 tablet 2   • pravastatin (PRAVACHOL) 40 MG tablet Take 40 mg by mouth daily.     • vitamin C (ASCORBIC ACID) 500 MG tablet Take 500 mg by mouth daily.     • [DISCONTINUED] oxyCODONE-acetaminophen (PERCOCET) 5-325 MG per tablet Take 1-2 tablets by mouth Every 4 (Four) Hours As Needed (Pain). 10 tablet 0   • [DISCONTINUED] probiotic (CULTURELLE) capsule capsule Take 1 capsule by mouth Daily.       No current facility-administered medications on file prior to visit.       No Known Allergies    Social History     Socioeconomic History   • Marital status:      Spouse name: Not on file   • Number of children: Not on file   • Years of education: Not on file   • Highest education level: Not on file   Tobacco Use   • Smoking status: Never Smoker   • Smokeless tobacco: Never Used   Vaping Use   • Vaping Use: Never used   Substance and Sexual Activity   • Alcohol use: No   • Drug use: No   • Sexual activity: Defer     Comment: POSTMENOPAUSAL       Family History   Problem Relation Age of Onset   • Heart attack Father    • Hypertension Brother    • Heart failure Brother    • Heart attack Brother        PHYSICAL EXAM:  Lungs clear.  Heart regular rate.   "Laparoscopy scars well-healing    /66   Pulse 64   Temp 97.7 °F (36.5 °C)   Resp 20   Ht 152.4 cm (60\")   Wt 61.2 kg (135 lb)   BMI 26.37 kg/m²     ECOG score: 1     Karnofsky score: 90         Lab Results   Component Value Date    HGB 8.3 (L) 07/10/2021    HCT 26.4 (L) 07/10/2021    MCV 92.6 07/10/2021     07/10/2021    WBC 10.10 07/10/2021    NEUTROABS 8.05 (H) 07/10/2021    LYMPHSABS 1.09 07/10/2021    MONOSABS 0.88 07/10/2021    EOSABS 0.00 07/10/2021    BASOSABS 0.01 07/10/2021     Lab Results   Component Value Date    GLUCOSE 156 (H) 07/10/2021    BUN 8 07/10/2021    CREATININE 0.55 (L) 07/10/2021     07/10/2021    K 3.7 07/10/2021     07/10/2021    CO2 24.0 07/10/2021    CALCIUM 8.4 (L) 07/10/2021    PROTEINTOT 6.3 06/11/2021    ALBUMIN 3.90 06/11/2021    BILITOT 0.2 06/11/2021    ALKPHOS 66 06/11/2021    AST 17 06/11/2021    ALT 12 06/11/2021         Assessment/Plan   1.  GI stromal tumor  2.  Paroxysmal atrial fibrillation found on heart monitor during colonoscopy 2015 previously diagnosed 2003 with spontaneous conversion previously.  Previously on Coumadin for stroke prophylaxis for 1 year and then discontinued.  Ablated 7/13/2016 Dr. Chow  3.  Hypertension  4.  Sigmoid hyperplastic polyp 11/11/2015 with Dr. Armando Wallis  5.  Hyperlipidemia  6.  Hypothyroidism  7.  History of ASHANTI/BSO    Oncology history timeline:  -6/11/2021 presented to emergency room with syncope and hematemesis.  No abdominal pain diarrhea or constipation.  No melena or hematochezia.  Has been taking low-dose aspirin.  No history of GI bleeding.  Prior colonoscopy 5 years previous.  Fully vaccinated for COVID-19.  CT abdomen pelvis showed large mass posterior body wall stomach 4.4 x 4.3 cm with liver, spleen, and pancreas otherwise unremarkable.  Hemoglobin 10.9 white count 17,200 platelets 224,000 otherwise unremarkable differential.  CMP shows normal liver enzymes and creatinine.  -6/12/2021 EGD Dr." Rebecca Rico showed submucosal mass with ulceration at the fundus and a Mariann-Worthington tear with hiatal hernia.  Plans were made for follow-up on biopsy and outpatient EUS.  Pathology showed benign gastric epithelium with foveolar hyperplasia negative for metaplasia or dysplasia or malignancy or H. pylori.  CT head without contrast negative  -6/13/2021 hemoglobin 6.9  -7/7/2021 hemoglobin 9.5  -7/9/2021 admitted by Dr. Dr. Farmer for laparoscopic removal of gastric mass and possible gastrectomy laparoscopically.  Upon inspection of the stomach, gastric mass was in the upper lesser curvature 5 cm distal to the GE junction.  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  Positive for  and CD34.  Negative for S100 and desmin.  -7/10/2021 hemoglobin 8.3 with normochromic normocytic indices and unremarkable differential.  -7/13/2021 discharged from hospital.  -7/26/2021 follow-up with Dr. Farmer as outpatient.  No postoperative issues.  With healing well.    -8/17/2021 [facility] medical oncology consultation: I reviewed Dr. Farmer's excellent care.  All tumors over 2 cm in size should be excised as was this case.  These tumors can be isodense on CT.  Hepatic metastases can be better visualized either with PET CT or MRI.  With the tumor over 2 cm in size, I will get completion staging with a CT of the chest with postoperative restaging CT abdomen pelvis to reestablish anatomy postoperatively and I think as explained below the overall risk testis is well enough to not put her through the PET.  Risk of progression free survival for a tumor 2-5 cm with less than 5 mitoses per 50 high-power fields arising from the stomach has a 98.1% rate of progression free survival.  NIH risk stratification for tumor of the size with less than 5 mitoses per 50 high-power fields would be considered a low risk for metastasis or recurrence  "regardless of primarysite, left ocular factors.  AJCC staging for gastric GIST's would categorize this stage Ia for tumor less than 5 cm with a low mitotic rate and observed rate of progressive disease from 0-2%.  [facility] GIST nomogram predicts a 91% 5-year probability of recurrence free survival after surgery alone, 96% at 2 years.  Though technically, adjuvant Gleevec has been approved for any tumor over 3 cm in size, there is no clear consistent consensus amongst expert groups as to what cutoff might constitute the lowest \"acceptable\" level of risk for metastasis or recurrence that would justify the use of adjuvant imatinib and it is not clear which one prognostic tool performed another.  Whether the results of molecular testing should be integrated into risk stratification is also unclear there is no data showing benefit from adjuvant Gleevec with positive risk stratification based on SDH, NF PDGFR alpha.  Gleevec is generally fairly well-tolerated and while the general recommendation for adjuvant therapy for tumors over 3 cm in size, without further risk stratification this is a highly heterogeneous population based on mitotic index, primary site, molecular factors.  On balance, at age 75 with other comorbidities and the overall predicted low risk of metastasis with gastric primary under 5 cm with low mitotic rate, I would not recommend adjuvant imatinib.  There are no evidence-based guidelines for follow-up of completely resected GIST tumors.  General recommendation is for history and physical every 3 to 6 months for 5 years and then annually with a CT recommended every 3 to 6 months for 3 to 5 years and then annually as there can be late recurrences.  Other offers have recommended that for low risk cases, every other year scan seems reasonable.  Further that may arise in the future as more data is collected.  For now I will have her get completion staging CT chest with postoperative " restaging/baseline CT abdomen pelvis and after that probably repeated in 6 months and then a year after that and then every other year from that point forward until she is old enough that she would not want us to intervene.  Endoscopic follow-up is not routine unless symptoms dictate.  We will follow up on her anemia as well.    Total time of care today inclusive of time spent today prior to arrival reviewing extensive prior records as above and during visit translating that information and going over the risk benefit analysis which is quite complex as outlined above as to adjuvant therapy and follow-up issues and after visit instituting a plan outlined above took 1 hour patient care time throughout the day today.  Gus Arias MD    8/17/2021

## 2021-08-26 ENCOUNTER — HOSPITAL ENCOUNTER (OUTPATIENT)
Dept: CT IMAGING | Facility: HOSPITAL | Age: 76
Discharge: HOME | End: 2021-08-26
Admitting: INTERNAL MEDICINE

## 2021-08-26 DIAGNOSIS — C49.A0 GASTROINTESTINAL STROMAL TUMOR (GIST) (HCC): ICD-10-CM

## 2021-08-26 LAB
ALBUMIN SERPL-MCNC: 4.6 G/DL (ref 3.5–5.2)
ALBUMIN/GLOB SERPL: 1.4 G/DL
ALP SERPL-CCNC: 83 U/L (ref 39–117)
ALT SERPL W P-5'-P-CCNC: 15 U/L (ref 1–33)
ANION GAP SERPL CALCULATED.3IONS-SCNC: 12 MMOL/L (ref 5–15)
AST SERPL-CCNC: 28 U/L (ref 1–32)
BASOPHILS # BLD AUTO: 0.07 10*3/MM3 (ref 0–0.2)
BASOPHILS NFR BLD AUTO: 0.9 % (ref 0–1.5)
BILIRUB SERPL-MCNC: 0.2 MG/DL (ref 0–1.2)
BUN SERPL-MCNC: 18 MG/DL (ref 8–23)
BUN/CREAT SERPL: 22.8 (ref 7–25)
CALCIUM SPEC-SCNC: 10.3 MG/DL (ref 8.6–10.5)
CHLORIDE SERPL-SCNC: 100 MMOL/L (ref 98–107)
CO2 SERPL-SCNC: 26 MMOL/L (ref 22–29)
CREAT BLDA-MCNC: 0.9 MG/DL (ref 0.6–1.3)
CREAT SERPL-MCNC: 0.79 MG/DL (ref 0.57–1)
DEPRECATED RDW RBC AUTO: 52.8 FL (ref 37–54)
EOSINOPHIL # BLD AUTO: 0.14 10*3/MM3 (ref 0–0.4)
EOSINOPHIL NFR BLD AUTO: 1.8 % (ref 0.3–6.2)
ERYTHROCYTE [DISTWIDTH] IN BLOOD BY AUTOMATED COUNT: 16.2 % (ref 12.3–15.4)
GFR SERPL CREATININE-BSD FRML MDRD: 71 ML/MIN/1.73
GLOBULIN UR ELPH-MCNC: 3.3 GM/DL
GLUCOSE SERPL-MCNC: 83 MG/DL (ref 65–99)
HCT VFR BLD AUTO: 42.3 % (ref 34–46.6)
HGB BLD-MCNC: 12.8 G/DL (ref 12–15.9)
IMM GRANULOCYTES # BLD AUTO: 0.01 10*3/MM3 (ref 0–0.05)
IMM GRANULOCYTES NFR BLD AUTO: 0.1 % (ref 0–0.5)
LYMPHOCYTES # BLD AUTO: 3.78 10*3/MM3 (ref 0.7–3.1)
LYMPHOCYTES NFR BLD AUTO: 49.6 % (ref 19.6–45.3)
MCH RBC QN AUTO: 26.8 PG (ref 26.6–33)
MCHC RBC AUTO-ENTMCNC: 30.3 G/DL (ref 31.5–35.7)
MCV RBC AUTO: 88.5 FL (ref 79–97)
MONOCYTES # BLD AUTO: 0.66 10*3/MM3 (ref 0.1–0.9)
MONOCYTES NFR BLD AUTO: 8.7 % (ref 5–12)
NEUTROPHILS NFR BLD AUTO: 2.96 10*3/MM3 (ref 1.7–7)
NEUTROPHILS NFR BLD AUTO: 38.9 % (ref 42.7–76)
NRBC BLD AUTO-RTO: 0 /100 WBC (ref 0–0.2)
PLATELET # BLD AUTO: 271 10*3/MM3 (ref 140–450)
PMV BLD AUTO: 11 FL (ref 6–12)
POTASSIUM SERPL-SCNC: 4 MMOL/L (ref 3.5–5.2)
PROT SERPL-MCNC: 7.9 G/DL (ref 6–8.5)
RBC # BLD AUTO: 4.78 10*6/MM3 (ref 3.77–5.28)
SODIUM SERPL-SCNC: 138 MMOL/L (ref 136–145)
WBC # BLD AUTO: 7.62 10*3/MM3 (ref 3.4–10.8)

## 2021-08-26 PROCEDURE — 80053 COMPREHEN METABOLIC PANEL: CPT | Performed by: INTERNAL MEDICINE

## 2021-08-26 PROCEDURE — 71260 CT THORAX DX C+: CPT

## 2021-08-26 PROCEDURE — 25010000002 IOPAMIDOL 61 % SOLUTION: Performed by: INTERNAL MEDICINE

## 2021-08-26 PROCEDURE — 82565 ASSAY OF CREATININE: CPT

## 2021-08-26 PROCEDURE — 85025 COMPLETE CBC W/AUTO DIFF WBC: CPT | Performed by: INTERNAL MEDICINE

## 2021-08-26 PROCEDURE — 74177 CT ABD & PELVIS W/CONTRAST: CPT

## 2021-08-26 RX ADMIN — IOPAMIDOL 95 ML: 612 INJECTION, SOLUTION INTRAVENOUS at 10:24

## 2021-08-31 ENCOUNTER — TELEPHONE (OUTPATIENT)
Dept: ONCOLOGY | Facility: CLINIC | Age: 76
End: 2021-08-31

## 2021-08-31 NOTE — TELEPHONE ENCOUNTER
Provider: DR MARIA  Caller: YOGESH FERNÁNDEZ  Relationship to Patient: SELF  Reason for Call: PT WANTS TO MAKE SURE TEST RESULTS GET SENT TO PCP

## 2021-08-31 NOTE — TELEPHONE ENCOUNTER
Called and informed patient that her PCP gets a copy of Dr. Arias office note after each visit sent via fax. She verbalized understanding.

## 2021-09-09 ENCOUNTER — TELEMEDICINE (OUTPATIENT)
Dept: ONCOLOGY | Facility: CLINIC | Age: 76
End: 2021-09-09

## 2021-09-09 DIAGNOSIS — C49.A0 GASTROINTESTINAL STROMAL TUMOR (GIST) (HCC): Primary | Chronic | ICD-10-CM

## 2021-09-09 PROCEDURE — 99214 OFFICE O/P EST MOD 30 MIN: CPT | Performed by: INTERNAL MEDICINE

## 2021-09-09 NOTE — PROGRESS NOTES
This was an audio and video enabled telemedicine encounter.  Done for COVID-19 risk reduction.  Verbal consent given.    CHIEF COMPLAINT: GIST    Problem List:  Oncology/Hematology History Overview Note   1.  GI stromal tumor  2.  Paroxysmal atrial fibrillation found on heart monitor during colonoscopy 2015 previously diagnosed 2003 with spontaneous conversion previously.  Previously on Coumadin for stroke prophylaxis for 1 year and then discontinued.  Ablated 7/13/2016 Dr. Chow  3.  Hypertension  4.  Sigmoid hyperplastic polyp 11/11/2015 with Dr. Armando Wallis  5.  Hyperlipidemia  6.  Hypothyroidism  7.  History of ASHANTI/BSO    Oncology history timeline:  -6/11/2021 presented to emergency room with syncope and hematemesis.  No abdominal pain diarrhea or constipation.  No melena or hematochezia.  Has been taking low-dose aspirin.  No history of GI bleeding.  Prior colonoscopy 5 years previous.  Fully vaccinated for COVID-19.  CT abdomen pelvis showed large mass posterior body wall stomach 4.4 x 4.3 cm with liver, spleen, and pancreas otherwise unremarkable.  Hemoglobin 10.9 white count 17,200 platelets 224,000 otherwise unremarkable differential.  CMP shows normal liver enzymes and creatinine.  -6/12/2021 EGD Dr. Rebecca Gómez showed submucosal mass with ulceration at the fundus and a Mariann-Worthington tear with hiatal hernia.  Plans were made for follow-up on biopsy and outpatient EUS.  Pathology showed benign gastric epithelium with foveolar hyperplasia negative for metaplasia or dysplasia or malignancy or H. pylori.  CT head without contrast negative  -6/13/2021 hemoglobin 6.9  -7/7/2021 hemoglobin 9.5  -7/9/2021 admitted by Dr. Dr. Farmer for laparoscopic removal of gastric mass and possible gastrectomy laparoscopically.  Upon inspection of the stomach, gastric mass was in the upper lesser curvature 5 cm distal to the GE junction.  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  Positive for  and CD34.  Negative for S100 and desmin.  -7/10/2021 hemoglobin 8.3 with normochromic normocytic indices and unremarkable differential.  -7/13/2021 discharged from hospital.  -7/26/2021 follow-up with Dr. Farmer as outpatient.  No postoperative issues.  With healing well.    -8/17/2021 [facility] medical oncology consultation: I reviewed Dr. Farmer's excellent care.  All tumors over 2 cm in size should be excised as was this case.  These tumors can be isodense on CT.  Hepatic metastases can be better visualized either with PET CT or MRI.  With the tumor over 2 cm in size, I will get completion staging with a CT of the chest with postoperative restaging CT abdomen pelvis to reestablish anatomy postoperatively and I think as explained below the overall risk testis is well enough to not put her through the PET.  Risk of progression free survival for a tumor 2-5 cm with less than 5 mitoses per 50 high-power fields arising from the stomach has a 98.1% rate of progression free survival.  NIH risk stratification for tumor of the size with less than 5 mitoses per 50 high-power fields would be considered a low risk for metastasis or recurrence regardless of primarysite, left ocular factors.  AJCC staging for gastric GIST's would categorize this stage Ia for tumor less than 5 cm with a low mitotic rate and observed rate of progressive disease from 0-2%.  [facility] GIST nomogram predicts a 91% 5-year probability of recurrence free survival after surgery alone, 96% at 2 years.  Though technically, adjuvant Gleevec has been approved for any tumor over 3 cm in size, there is no clear consistent consensus amongst expert groups as to what cutoff might constitute the lowest \"acceptable\" level of risk for metastasis or recurrence that would justify the use of adjuvant imatinib and it is not clear which one prognostic tool out-performs " another.  Whether the results of molecular testing should be integrated into risk stratification is also unclear. There is no data showing benefit from adjuvant Gleevec with positive risk stratification based on SDH, NF PDGFR alpha.  Gleevec is generally fairly well-tolerated and while the general recommendation is for adjuvant therapy for tumors over 3 cm in size, without further risk stratification this is a highly heterogeneous population based on mitotic index, primary site, molecular factors.  On balance, at age 75 with other comorbidities and the overall predicted low risk of metastasis with gastric primary under 5 cm with low mitotic rate, I would not recommend adjuvant imatinib.  There are no evidence-based guidelines for follow-up of completely resected GIST tumors.  General recommendation is for history and physical every 3 to 6 months for 5 years and then annually with a CT recommended every 3 to 6 months for 3 to 5 years and then annually as there can be late recurrences.  Other authors have recommended that, for low risk cases, every other year scan seems reasonable.  Further data  to guide us may arise in the future as more data is collected.  For now I will have her get completion staging CT chest with postoperative restaging/baseline CT abdomen pelvis and after that probably repeated in 6 months and then a year after that and then every other year from that point forward until she is old enough that she would not want us to intervene.  Endoscopic follow-up is not routine unless symptoms dictate.  We will follow up on her anemia as well.    -8/26/2021 data:  CT chest abdomen pelvis with contrast shows postsurgical changes with no intrathoracic abnormalities and low attenuation of hepatic steatosis without focal liver lesion and no adenopathy.   CBC unremarkable with hemoglobin 12.8.  CMP creatinine 0.79 with normal calcium 10.3 and normal liver enzymes, alkaline phosphatase, total protein, albumin,  globulin, ratios.    -9/9/2021 [facility] medical oncology virtual visit: I reviewed the above data with the patient.  No hint of disease at this junction.  Would not put her through an MRI or PET at this junction given the low likelihood of metastasis with her clinical features.  We will get her CT chest abdomen pelvis with contrast along with CBC and CMP in 6 months and then a year after that and then every other year from that point forward until she is old enough that she would not want us to intervene.  Endoscopic follow-up is not routine unless symptoms dictate.  She will see my nurse practitioner back in 6 months for virtual visit to go over this.  We will continue this surveillance until she is of an age by her determination that she would not want us to intervene should we find an abnormality as there can be late recurrences.     Gastrointestinal stromal tumor (GIST) (CMS/HCC)   8/17/2021 Cancer Staged    Staging form: Gastrointestinal Stromal Tumor - Gastric And Omental Gist, AJCC 8th Edition  - Clinical: Stage IA (cT2, cN0, cM0, Mitotic Rate: Low) - Signed by Gus Arias MD on 8/17/2021         HISTORY OF PRESENT ILLNESS:  The patient is a 75 y.o. female, here for follow up on management of GIST.  No somatic complaints    Past Medical History:   Diagnosis Date   • A-fib (CMS/HCC)    • Allergic rhinitis    • Anemia    • Delayed emergence from anesthesia    • Disease of thyroid gland    • GERD (gastroesophageal reflux disease)    • History of transfusion     NO REACTION   • Hyperlipidemia    • Hypertension    • Hypothyroidism    • PONV (postoperative nausea and vomiting)    • Postmenopausal    • Torn meniscus      Past Surgical History:   Procedure Laterality Date   • APPENDECTOMY     • CARDIAC ELECTROPHYSIOLOGY PROCEDURE N/A 7/13/2016    Procedure: PVA;  Surgeon: Brian Chow MD;  Location: [facility] INVASIVE LOCATION;  Service:    • CATARACT EXTRACTION     • CATARACT EXTRACTION     • CATARACT  EXTRACTION     •  SECTION     • COLONOSCOPY     • ENDOSCOPY N/A 2021    Procedure: ESOPHAGOGASTRODUODENOSCOPY;  Surgeon: Rebecca Gómez MD;  Location: [facility] ENDOSCOPY;  Service: Gastroenterology;  Laterality: N/A;   • EYE SURGERY      BILATERAL CATARACTS REMOVED   • HYSTERECTOMY     • PARAESOPHAGEAL HERNIA REPAIR N/A 2021    Procedure: LAPAROSCOPIC REMOVAL OF GASTRIC MASS, LAPAROSCOPIC HIATAL HERNIA REPAIR;  Surgeon: Alex Farmer MD;  Location: [facility] OR;  Service: General;  Laterality: N/A;       No Known Allergies    Family History and Social History reviewed and changed as necessary    REVIEW OF SYSTEM:   No somatic complaints    PHYSICAL EXAM:  No respiratory distress.  No jaundice or icterus    There were no vitals filed for this visit.  There were no vitals filed for this visit.       ECOG: (0) Fully Active - Able to Carry On All Pre-disease Performance Without Restriction    Lab Results   Component Value Date    HGB 12.8 2021    HCT 42.3 2021    MCV 88.5 2021     2021    WBC 7.62 2021    NEUTROABS 2.96 2021    LYMPHSABS 3.78 (H) 2021    MONOSABS 0.66 2021    EOSABS 0.14 2021    BASOSABS 0.07 2021       Lab Results   Component Value Date    GLUCOSE 83 2021    BUN 18 2021    CREATININE 0.79 2021     2021    K 4.0 2021     2021    CO2 26.0 2021    CALCIUM 10.3 2021    PROTEINTOT 7.9 2021    ALBUMIN 4.60 2021    BILITOT 0.2 2021    ALKPHOS 83 2021    AST 28 2021    ALT 15 2021             ASSESSMENT & PLAN:  1.  GI stromal tumor  2.  Paroxysmal atrial fibrillation found on heart monitor during colonoscopy  previously diagnosed  with spontaneous conversion previously.  Previously on Coumadin for stroke prophylaxis for 1 year and then discontinued.  Ablated 2016 Dr. Chow  3.  Hypertension  4.  Sigmoid  hyperplastic polyp 11/11/2015 with Dr. Armando Wallis  5.  Hyperlipidemia  6.  Hypothyroidism  7.  History of ASHANTI/BSO    Oncology history timeline:  -6/11/2021 presented to emergency room with syncope and hematemesis.  No abdominal pain diarrhea or constipation.  No melena or hematochezia.  Has been taking low-dose aspirin.  No history of GI bleeding.  Prior colonoscopy 5 years previous.  Fully vaccinated for COVID-19.  CT abdomen pelvis showed large mass posterior body wall stomach 4.4 x 4.3 cm with liver, spleen, and pancreas otherwise unremarkable.  Hemoglobin 10.9 white count 17,200 platelets 224,000 otherwise unremarkable differential.  CMP shows normal liver enzymes and creatinine.  -6/12/2021 EGD Dr. Rebecca Gómez showed submucosal mass with ulceration at the fundus and a Mariann-Worthington tear with hiatal hernia.  Plans were made for follow-up on biopsy and outpatient EUS.  Pathology showed benign gastric epithelium with foveolar hyperplasia negative for metaplasia or dysplasia or malignancy or H. pylori.  CT head without contrast negative  -6/13/2021 hemoglobin 6.9  -7/7/2021 hemoglobin 9.5  -7/9/2021 admitted by Dr. Dr. Farmer for laparoscopic removal of gastric mass and possible gastrectomy laparoscopically.  Upon inspection of the stomach, gastric mass was in the upper lesser curvature 5 cm distal to the GE junction.  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  Positive for  and CD34.  Negative for S100 and desmin.  -7/10/2021 hemoglobin 8.3 with normochromic normocytic indices and unremarkable differential.  -7/13/2021 discharged from hospital.  -7/26/2021 follow-up with Dr. Farmer as outpatient.  No postoperative issues.  With healing well.    -8/17/2021 [facility] medical oncology consultation: I reviewed Dr. Farmer's excellent care.  All tumors over 2 cm in size should be excised as was this case.  These  "tumors can be isodense on CT.  Hepatic metastases can be better visualized either with PET CT or MRI.  With the tumor over 2 cm in size, I will get completion staging with a CT of the chest with postoperative restaging CT abdomen pelvis to reestablish anatomy postoperatively and I think as explained below the overall risk testis is well enough to not put her through the PET.  Risk of progression free survival for a tumor 2-5 cm with less than 5 mitoses per 50 high-power fields arising from the stomach has a 98.1% rate of progression free survival.  NIH risk stratification for tumor of the size with less than 5 mitoses per 50 high-power fields would be considered a low risk for metastasis or recurrence regardless of primarysite, left ocular factors.  AJCC staging for gastric GIST's would categorize this stage Ia for tumor less than 5 cm with a low mitotic rate and observed rate of progressive disease from 0-2%.  [facility] GIST nomogram predicts a 91% 5-year probability of recurrence free survival after surgery alone, 96% at 2 years.  Though technically, adjuvant Gleevec has been approved for any tumor over 3 cm in size, there is no clear consistent consensus amongst expert groups as to what cutoff might constitute the lowest \"acceptable\" level of risk for metastasis or recurrence that would justify the use of adjuvant imatinib and it is not clear which one prognostic tool out-performs another.  Whether the results of molecular testing should be integrated into risk stratification is also unclear. There is no data showing benefit from adjuvant Gleevec with positive risk stratification based on SDH, NF PDGFR alpha.  Gleevec is generally fairly well-tolerated and while the general recommendation is for adjuvant therapy for tumors over 3 cm in size, without further risk stratification this is a highly heterogeneous population based on mitotic index, primary site, molecular factors.  On balance, at age 75 " with other comorbidities and the overall predicted low risk of metastasis with gastric primary under 5 cm with low mitotic rate, I would not recommend adjuvant imatinib.  There are no evidence-based guidelines for follow-up of completely resected GIST tumors.  General recommendation is for history and physical every 3 to 6 months for 5 years and then annually with a CT recommended every 3 to 6 months for 3 to 5 years and then annually as there can be late recurrences.  Other authors have recommended that, for low risk cases, every other year scan seems reasonable.  Further data  to guide us may arise in the future as more data is collected.  For now I will have her get completion staging CT chest with postoperative restaging/baseline CT abdomen pelvis and after that probably repeated in 6 months and then a year after that and then every other year from that point forward until she is old enough that she would not want us to intervene.  Endoscopic follow-up is not routine unless symptoms dictate.  We will follow up on her anemia as well.    -8/26/2021 data:  CT chest abdomen pelvis with contrast shows postsurgical changes with no intrathoracic abnormalities and low attenuation of hepatic steatosis without focal liver lesion and no adenopathy.   CBC unremarkable with hemoglobin 12.8.  CMP creatinine 0.79 with normal calcium 10.3 and normal liver enzymes, alkaline phosphatase, total protein, albumin, globulin, ratios.    -9/9/2021 [facility] medical oncology virtual visit: I reviewed the above data with the patient.  No hint of disease at this junction.  Would not put her through an MRI or PET at this junction given the low likelihood of metastasis with her clinical features.  We will get her CT chest abdomen pelvis with contrast along with CBC and CMP in 6 months and then a year after that and then every other year from that point forward until she is old enough that she would not want us to intervene.  Endoscopic  follow-up is not routine unless symptoms dictate.  She will see my nurse practitioner back in 6 months for virtual visit to go over this.  We will continue this surveillance until she is of an age by her determination that she would not want us to intervene should we find an abnormality as there can be late recurrences.    Total time of care today inclusive of time spent today prior to visit reviewing images and reports of scans as above and reviewing the data and translating this information to her during the visit and instituting the follow-up as outlined above took 30 minutes of total patient care time throughout the day today.  Gus Arias MD    09/09/2021

## 2022-03-01 ENCOUNTER — HOSPITAL ENCOUNTER (OUTPATIENT)
Dept: CT IMAGING | Facility: HOSPITAL | Age: 77
Discharge: HOME | End: 2022-03-01

## 2022-03-01 ENCOUNTER — LAB (OUTPATIENT)
Dept: LAB | Facility: HOSPITAL | Age: 77
End: 2022-03-01

## 2022-03-01 DIAGNOSIS — C49.A0 GASTROINTESTINAL STROMAL TUMOR (GIST): Chronic | ICD-10-CM

## 2022-03-01 DIAGNOSIS — C49.A0 GASTROINTESTINAL STROMAL TUMOR (GIST): ICD-10-CM

## 2022-03-01 LAB
ALBUMIN SERPL-MCNC: 4.4 G/DL (ref 3.5–5.2)
ALBUMIN/GLOB SERPL: 1.3 G/DL
ALP SERPL-CCNC: 96 U/L (ref 39–117)
ALT SERPL W P-5'-P-CCNC: 9 U/L (ref 1–33)
ANION GAP SERPL CALCULATED.3IONS-SCNC: 12 MMOL/L (ref 5–15)
AST SERPL-CCNC: 22 U/L (ref 1–32)
BASOPHILS # BLD AUTO: 0.06 10*3/MM3 (ref 0–0.2)
BASOPHILS NFR BLD AUTO: 0.5 % (ref 0–1.5)
BILIRUB SERPL-MCNC: 0.4 MG/DL (ref 0–1.2)
BUN SERPL-MCNC: 11 MG/DL (ref 8–23)
BUN/CREAT SERPL: 16.2 (ref 7–25)
CALCIUM SPEC-SCNC: 10.4 MG/DL (ref 8.6–10.5)
CHLORIDE SERPL-SCNC: 99 MMOL/L (ref 98–107)
CO2 SERPL-SCNC: 28 MMOL/L (ref 22–29)
CREAT BLDA-MCNC: 0.7 MG/DL (ref 0.6–1.3)
CREAT SERPL-MCNC: 0.68 MG/DL (ref 0.57–1)
DEPRECATED RDW RBC AUTO: 45.3 FL (ref 37–54)
EGFRCR SERPLBLD CKD-EPI 2021: 90.4 ML/MIN/1.73
EOSINOPHIL # BLD AUTO: 0.14 10*3/MM3 (ref 0–0.4)
EOSINOPHIL NFR BLD AUTO: 1.2 % (ref 0.3–6.2)
ERYTHROCYTE [DISTWIDTH] IN BLOOD BY AUTOMATED COUNT: 13.3 % (ref 12.3–15.4)
GLOBULIN UR ELPH-MCNC: 3.3 GM/DL
GLUCOSE SERPL-MCNC: 82 MG/DL (ref 65–99)
HCT VFR BLD AUTO: 43.8 % (ref 34–46.6)
HGB BLD-MCNC: 14 G/DL (ref 12–15.9)
IMM GRANULOCYTES # BLD AUTO: 0.03 10*3/MM3 (ref 0–0.05)
IMM GRANULOCYTES NFR BLD AUTO: 0.3 % (ref 0–0.5)
LYMPHOCYTES # BLD AUTO: 3.33 10*3/MM3 (ref 0.7–3.1)
LYMPHOCYTES NFR BLD AUTO: 29.5 % (ref 19.6–45.3)
MCH RBC QN AUTO: 29.7 PG (ref 26.6–33)
MCHC RBC AUTO-ENTMCNC: 32 G/DL (ref 31.5–35.7)
MCV RBC AUTO: 92.8 FL (ref 79–97)
MONOCYTES # BLD AUTO: 0.88 10*3/MM3 (ref 0.1–0.9)
MONOCYTES NFR BLD AUTO: 7.8 % (ref 5–12)
NEUTROPHILS NFR BLD AUTO: 6.86 10*3/MM3 (ref 1.7–7)
NEUTROPHILS NFR BLD AUTO: 60.7 % (ref 42.7–76)
NRBC BLD AUTO-RTO: 0 /100 WBC (ref 0–0.2)
PLATELET # BLD AUTO: 241 10*3/MM3 (ref 140–450)
PMV BLD AUTO: 11.2 FL (ref 6–12)
POTASSIUM SERPL-SCNC: 3 MMOL/L (ref 3.5–5.2)
PROT SERPL-MCNC: 7.7 G/DL (ref 6–8.5)
RBC # BLD AUTO: 4.72 10*6/MM3 (ref 3.77–5.28)
SODIUM SERPL-SCNC: 139 MMOL/L (ref 136–145)
WBC NRBC COR # BLD: 11.3 10*3/MM3 (ref 3.4–10.8)

## 2022-03-01 PROCEDURE — 74177 CT ABD & PELVIS W/CONTRAST: CPT

## 2022-03-01 PROCEDURE — 36415 COLL VENOUS BLD VENIPUNCTURE: CPT

## 2022-03-01 PROCEDURE — 85025 COMPLETE CBC W/AUTO DIFF WBC: CPT

## 2022-03-01 PROCEDURE — 82565 ASSAY OF CREATININE: CPT

## 2022-03-01 PROCEDURE — 25010000002 IOPAMIDOL 61 % SOLUTION: Performed by: INTERNAL MEDICINE

## 2022-03-01 PROCEDURE — 80053 COMPREHEN METABOLIC PANEL: CPT

## 2022-03-01 PROCEDURE — 71260 CT THORAX DX C+: CPT

## 2022-03-01 RX ADMIN — IOPAMIDOL 95 ML: 612 INJECTION, SOLUTION INTRAVENOUS at 10:09

## 2022-03-10 ENCOUNTER — TELEMEDICINE (OUTPATIENT)
Dept: ONCOLOGY | Facility: CLINIC | Age: 77
End: 2022-03-10

## 2022-03-10 VITALS — BODY MASS INDEX: 26.37 KG/M2 | WEIGHT: 135 LBS

## 2022-03-10 DIAGNOSIS — C49.A0 GASTROINTESTINAL STROMAL TUMOR (GIST): Primary | Chronic | ICD-10-CM

## 2022-03-10 PROCEDURE — 99213 OFFICE O/P EST LOW 20 MIN: CPT | Performed by: NURSE PRACTITIONER

## 2022-03-10 NOTE — PROGRESS NOTES
This was an audio and video enabled telemedicine encounter via [place].  The patient was at her home residence for the visit, I was at our [facility] clinic location.  Done for COVID-19 risk reduction.  Verbal consent given.    CHIEF COMPLAINT: GIST    Problem List:  Oncology/Hematology History Overview Note   1.  GI stromal tumor  2.  Paroxysmal atrial fibrillation found on heart monitor during colonoscopy 2015 previously diagnosed 2003 with spontaneous conversion previously.  Previously on Coumadin for stroke prophylaxis for 1 year and then discontinued.  Ablated 7/13/2016 Dr. Chow  3.  Hypertension  4.  Sigmoid hyperplastic polyp 11/11/2015 with Dr. Armando Wallis  5.  Hyperlipidemia      6.  Hypothyroidism  7.  History of ASHANTI/BSO    Oncology history timeline:  -6/11/2021 presented to emergency room with syncope and hematemesis.  No abdominal pain diarrhea or constipation.  No melena or hematochezia.  Has been taking low-dose aspirin.  No history of GI bleeding.  Prior colonoscopy 5 years previous.  Fully vaccinated for COVID-19.  CT abdomen pelvis showed large mass posterior body wall stomach 4.4 x 4.3 cm with liver, spleen, and pancreas otherwise unremarkable.  Hemoglobin 10.9 white count 17,200 platelets 224,000 otherwise unremarkable differential.  CMP shows normal liver enzymes and creatinine.  -6/12/2021 EGD Dr. Rebecca Gómez showed submucosal mass with ulceration at the fundus and a Mariann-Worthington tear with hiatal hernia.  Plans were made for follow-up on biopsy and outpatient EUS.  Pathology showed benign gastric epithelium with foveolar hyperplasia negative for metaplasia or dysplasia or malignancy or H. pylori.  CT head without contrast negative  -6/13/2021 hemoglobin 6.9  -7/7/2021 hemoglobin 9.5  -7/9/2021 admitted by Dr. Dr. Farmer for laparoscopic removal of gastric mass and possible gastrectomy laparoscopically.  Upon inspection of the stomach, gastric mass was in the upper lesser curvature 5 cm  "distal to the GE junction.  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  Positive for  and CD34.  Negative for S100 and desmin.  -7/10/2021 hemoglobin 8.3 with normochromic normocytic indices and unremarkable differential.  -7/13/2021 discharged from hospital.  -7/26/2021 follow-up with Dr. Farmer as outpatient.  No postoperative issues.  With healing well.    -8/17/2021 [facility] medical oncology consultation: I reviewed Dr. Farmer's excellent care.  All tumors over 2 cm in size should be excised as was this case.  These tumors can be isodense on CT.  Hepatic metastases can be better visualized either with PET CT or MRI.  With the tumor over 2 cm in size, I will get completion staging with a CT of the chest with postoperative restaging CT abdomen pelvis to reestablish anatomy postoperatively and I think as explained below the overall risk testis is well enough to not put her through the PET.  Risk of progression free survival for a tumor 2-5 cm with less than 5 mitoses per 50 high-power fields arising from the stomach has a 98.1% rate of progression free survival.  NIH risk stratification for tumor of the size with less than 5 mitoses per 50 high-power fields would be considered a low risk for metastasis or recurrence regardless of primarysite, left ocular factors.  AJCC staging for gastric GIST's would categorize this stage Ia for tumor less than 5 cm with a low mitotic rate and observed rate of progressive disease from 0-2%.  [facility] GIST nomogram predicts a 91% 5-year probability of recurrence free survival after surgery alone, 96% at 2 years.  Though technically, adjuvant Gleevec has been approved for any tumor over 3 cm in size, there is no clear consistent consensus amongst expert groups as to what cutoff might constitute the lowest \"acceptable\" level of risk for metastasis or " recurrence that would justify the use of adjuvant imatinib and it is not clear which one prognostic tool out-performs another.  Whether the results of molecular testing should be integrated into risk stratification is also unclear. There is no data showing benefit from adjuvant Gleevec with positive risk stratification based on SDH, NF PDGFR alpha.  Gleevec is generally fairly well-tolerated and while the general recommendation is for adjuvant therapy for tumors over 3 cm in size, without further risk stratification this is a highly heterogeneous population based on mitotic index, primary site, molecular factors.  On balance, at age 75 with other comorbidities and the overall predicted low risk of metastasis with gastric primary under 5 cm with low mitotic rate, I would not recommend adjuvant imatinib.  There are no evidence-based guidelines for follow-up of completely resected GIST tumors.  General recommendation is for history and physical every 3 to 6 months for 5 years and then annually with a CT recommended every 3 to 6 months for 3 to 5 years and then annually as there can be late recurrences.  Other authors have recommended that, for low risk cases, every other year scan seems reasonable.  Further data  to guide us may arise in the future as more data is collected.  For now I will have her get completion staging CT chest with postoperative restaging/baseline CT abdomen pelvis and after that probably repeated in 6 months and then a year after that and then every other year from that point forward until she is old enough that she would not want us to intervene.  Endoscopic follow-up is not routine unless symptoms dictate.  We will follow up on her anemia as well.    -8/26/2021 data:  CT chest abdomen pelvis with contrast shows postsurgical changes with no intrathoracic abnormalities and low attenuation of hepatic steatosis without focal liver lesion and no adenopathy.   CBC unremarkable with hemoglobin 12.8.   CMP creatinine 0.79 with normal calcium 10.3 and normal liver enzymes, alkaline phosphatase, total protein, albumin, globulin, ratios.    -9/9/2021 [facility] oncology virtual visit: I reviewed the above data with the patient.  No hint of disease at this junction.  Would not put her through an MRI or PET at this junction given the low likelihood of metastasis with her clinical features.  We will get her CT chest abdomen pelvis with contrast along with CBC and CMP in 6 months and then a year after that and then every other year from that point forward until she is old enough that she would not want us to intervene.  Endoscopic follow-up is not routine unless symptoms dictate.  She will see my nurse practitioner back in 6 months for virtual visit to go over this.  We will continue this surveillance until she is of an age by her determination that she would not want us to intervene should we find an abnormality as there can be late recurrences.    -3/1/2022 CT chest, abdomen and pelvis IMPRESSION:  1. Stable postsurgical changes at the stomach. No residual or recurrent  mass.   2. No findings of metastatic disease in the chest, abdomen, or pelvis.  3. Incidental chronic CT findings above including coronary  atherosclerotic calcifications and cholelithiasis.     Gastrointestinal stromal tumor (GIST) (HCC)   8/17/2021 Cancer Staged    Staging form: Gastrointestinal Stromal Tumor - Gastric And Omental Gist, AJCC 8th Edition  - Clinical: Stage IA (cT2, cN0, cM0, Mitotic Rate: Low) - Signed by Gus Arias MD on 8/17/2021         HISTORY OF PRESENT ILLNESS:  The patient is a 76 y.o. female, here for follow up on management of GIST.  Ms. Tillman has been feeling well since we saw her last with no new concerns.  She reports her appetite is normal, her weight is stable at around 135 pounds.  She has no new or concerning pain.    Past Medical History:   Diagnosis Date   • A-fib (HCC)    • Allergic rhinitis    • Anemia    •  Delayed emergence from anesthesia    • Disease of thyroid gland    • GERD (gastroesophageal reflux disease)    • History of transfusion     NO REACTION   • Hyperlipidemia    • Hypertension    • Hypothyroidism    • PONV (postoperative nausea and vomiting)    • Postmenopausal    • Torn meniscus      Past Surgical History:   Procedure Laterality Date   • APPENDECTOMY     • CARDIAC ELECTROPHYSIOLOGY PROCEDURE N/A 2016    Procedure: PVA;  Surgeon: Brian Chow MD;  Location: [facility] EP INVASIVE LOCATION;  Service:    • CATARACT EXTRACTION     • CATARACT EXTRACTION     • CATARACT EXTRACTION     •  SECTION     • COLONOSCOPY     • ENDOSCOPY N/A 2021    Procedure: ESOPHAGOGASTRODUODENOSCOPY;  Surgeon: Rebecca Gómez MD;  Location: [facility] ENDOSCOPY;  Service: Gastroenterology;  Laterality: N/A;   • EYE SURGERY      BILATERAL CATARACTS REMOVED   • HYSTERECTOMY     • PARAESOPHAGEAL HERNIA REPAIR N/A 2021    Procedure: LAPAROSCOPIC REMOVAL OF GASTRIC MASS, LAPAROSCOPIC HIATAL HERNIA REPAIR;  Surgeon: Alex Farmer MD;  Location: [facility] OR;  Service: General;  Laterality: N/A;       No Known Allergies    Family History and Social History reviewed and changed as necessary    REVIEW OF SYSTEM:   No somatic complaints    PHYSICAL EXAM:  Limited with video visit.  Patient appears in her usual state of health on video, conversation is appropriate.  No respiratory difficulty with conversation.    ECOG: (0) Fully Active - Able to Carry On All Pre-disease Performance Without Restriction    Lab Results   Component Value Date    HGB 14.0 2022    HCT 43.8 2022    MCV 92.8 2022     2022    WBC 11.30 (H) 2022    NEUTROABS 6.86 2022    LYMPHSABS 3.33 (H) 2022    MONOSABS 0.88 2022    EOSABS 0.14 2022    BASOSABS 0.06 2022       Lab Results   Component Value Date    GLUCOSE 82 2022    BUN 11 2022    CREATININE 0.70 2022    NA  139 03/01/2022    K 3.0 (L) 03/01/2022    CL 99 03/01/2022    CO2 28.0 03/01/2022    CALCIUM 10.4 03/01/2022    PROTEINTOT 7.7 03/01/2022    ALBUMIN 4.40 03/01/2022    BILITOT 0.4 03/01/2022    ALKPHOS 96 03/01/2022    AST 22 03/01/2022    ALT 9 03/01/2022       CT Chest With Contrast Diagnostic    Result Date: 3/1/2022  1. Stable postsurgical changes at the stomach. No residual or recurrent mass. 2. No findings of metastatic disease in the chest, abdomen, or pelvis. 3. Incidental chronic CT findings above including coronary atherosclerotic calcifications and cholelithiasis.  This report was finalized on 3/1/2022 1:33 PM by Puneet Prater MD.      CT Abdomen Pelvis With Contrast    Result Date: 3/1/2022  1. Stable postsurgical changes at the stomach. No residual or recurrent mass. 2. No findings of metastatic disease in the chest, abdomen, or pelvis. 3. Incidental chronic CT findings above including coronary atherosclerotic calcifications and cholelithiasis.  This report was finalized on 3/1/2022 1:33 PM by Puneet Prater MD.          ASSESSMENT & PLAN:  1.  GI stromal tumor  2.  Paroxysmal atrial fibrillation found on heart monitor during colonoscopy 2015 previously diagnosed 2003 with spontaneous conversion previously.  Previously on Coumadin for stroke prophylaxis for 1 year and then discontinued.  Ablated 7/13/2016 Dr. Chow  3.  Hypertension  4.  Sigmoid hyperplastic polyp 11/11/2015 with Dr. Armando Wallis  5.  Hyperlipidemia  6.  Hypothyroidism  7.  History of ASHANTI/BSO  8.  Hypokalemia    Oncology history timeline:  -6/11/2021 presented to emergency room with syncope and hematemesis.  No abdominal pain diarrhea or constipation.  No melena or hematochezia.  Has been taking low-dose aspirin.  No history of GI bleeding.  Prior colonoscopy 5 years previous.  Fully vaccinated for COVID-19.  CT abdomen pelvis showed large mass posterior body wall stomach 4.4 x 4.3 cm with liver, spleen, and pancreas otherwise  unremarkable.  Hemoglobin 10.9 white count 17,200 platelets 224,000 otherwise unremarkable differential.  CMP shows normal liver enzymes and creatinine.  -6/12/2021 EGD Dr. Rebecca Gómez showed submucosal mass with ulceration at the fundus and a Mariann-Worthington tear with hiatal hernia.  Plans were made for follow-up on biopsy and outpatient EUS.  Pathology showed benign gastric epithelium with foveolar hyperplasia negative for metaplasia or dysplasia or malignancy or H. pylori.  CT head without contrast negative  -6/13/2021 hemoglobin 6.9  -7/7/2021 hemoglobin 9.5  -7/9/2021 admitted by Dr. Dr. Farmer for laparoscopic removal of gastric mass and possible gastrectomy laparoscopically.  Upon inspection of the stomach, gastric mass was in the upper lesser curvature 5 cm distal to the GE junction.  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  Positive for  and CD34.  Negative for S100 and desmin.  -7/10/2021 hemoglobin 8.3 with normochromic normocytic indices and unremarkable differential.  -7/13/2021 discharged from hospital.  -7/26/2021 follow-up with Dr. Farmre as outpatient.  No postoperative issues.  With healing well.    -8/17/2021 [facility] medical oncology consultation: I reviewed Dr. Farmer's excellent care.  All tumors over 2 cm in size should be excised as was this case.  These tumors can be isodense on CT.  Hepatic metastases can be better visualized either with PET CT or MRI.  With the tumor over 2 cm in size, I will get completion staging with a CT of the chest with postoperative restaging CT abdomen pelvis to reestablish anatomy postoperatively and I think as explained below the overall risk testis is well enough to not put her through the PET.  Risk of progression free survival for a tumor 2-5 cm with less than 5 mitoses per 50 high-power fields arising from the stomach has a 98.1% rate of progression  "free survival.  NIH risk stratification for tumor of the size with less than 5 mitoses per 50 high-power fields would be considered a low risk for metastasis or recurrence regardless of primarysite, left ocular factors.  AJCC staging for gastric GIST's would categorize this stage Ia for tumor less than 5 cm with a low mitotic rate and observed rate of progressive disease from 0-2%.  [facility] GIST nomogram predicts a 91% 5-year probability of recurrence free survival after surgery alone, 96% at 2 years.  Though technically, adjuvant Gleevec has been approved for any tumor over 3 cm in size, there is no clear consistent consensus amongst expert groups as to what cutoff might constitute the lowest \"acceptable\" level of risk for metastasis or recurrence that would justify the use of adjuvant imatinib and it is not clear which one prognostic tool out-performs another.  Whether the results of molecular testing should be integrated into risk stratification is also unclear. There is no data showing benefit from adjuvant Gleevec with positive risk stratification based on SDH, NF PDGFR alpha.  Gleevec is generally fairly well-tolerated and while the general recommendation is for adjuvant therapy for tumors over 3 cm in size, without further risk stratification this is a highly heterogeneous population based on mitotic index, primary site, molecular factors.  On balance, at age 75 with other comorbidities and the overall predicted low risk of metastasis with gastric primary under 5 cm with low mitotic rate, I would not recommend adjuvant imatinib.  There are no evidence-based guidelines for follow-up of completely resected GIST tumors.  General recommendation is for history and physical every 3 to 6 months for 5 years and then annually with a CT recommended every 3 to 6 months for 3 to 5 years and then annually as there can be late recurrences.  Other authors have recommended that, for low risk cases, every " other year scan seems reasonable.  Further data  to guide us may arise in the future as more data is collected.  For now I will have her get completion staging CT chest with postoperative restaging/baseline CT abdomen pelvis and after that probably repeated in 6 months and then a year after that and then every other year from that point forward until she is old enough that she would not want us to intervene.  Endoscopic follow-up is not routine unless symptoms dictate.  We will follow up on her anemia as well.    -8/26/2021 data:  CT chest abdomen pelvis with contrast shows postsurgical changes with no intrathoracic abnormalities and low attenuation of hepatic steatosis without focal liver lesion and no adenopathy.   CBC unremarkable with hemoglobin 12.8.  CMP creatinine 0.79 with normal calcium 10.3 and normal liver enzymes, alkaline phosphatase, total protein, albumin, globulin, ratios.    -9/9/2021 [facility] medical oncology virtual visit: I reviewed the above data with the patient.  No hint of disease at this junction.  Would not put her through an MRI or PET at this junction given the low likelihood of metastasis with her clinical features.  We will get her CT chest abdomen pelvis with contrast along with CBC and CMP in 6 months and then a year after that and then every other year from that point forward until she is old enough that she would not want us to intervene.  Endoscopic follow-up is not routine unless symptoms dictate.  She will see my nurse practitioner back in 6 months for virtual visit to go over this.  We will continue this surveillance until she is of an age by her determination that she would not want us to intervene should we find an abnormality as there can be late recurrences.    -3/10/2022 [facility] Oncology clinic telemedicine follow-up:  Ms. Tillman continues to do well with no evidence of disease recurrence on current CT chest, abdomen and pelvis as reported above and no new worrisome symptoms.   We will continue surveillance as outlined by Dr. Arias at his 9/9/2021 visit with repeat CT chest, abdomen and pelvis with contrast along with CBC and CMP now in 1 year and if negative at that time we will go to every other year from that point forward.  We did discuss today her potassium which was a little low at 3.0.  She mentions that she has had trouble with her potassium in the past, she has had no diarrhea or any change in her medications.  I recommended she eat bananas or other foods with potassium and follow-up with Dr. Lorenzo and she states understanding and agreement.    Return to clinic in 1 year for follow-up.    I spent 20 minutes caring for Kade on this date of service. This time includes time spent by me in the following activities: preparing for the visit, reviewing tests, performing a medically appropriate examination and/or evaluation, counseling and educating the patient/family/caregiver, ordering medications, tests, or procedures and documenting information in the medical record.     Geraldine Delgadillo, APRN    03/10/2022

## 2022-12-13 ENCOUNTER — TRANSCRIBE ORDERS (OUTPATIENT)
Dept: CT IMAGING | Facility: CLINIC | Age: 77
End: 2022-12-13

## 2022-12-13 DIAGNOSIS — Z13.820 ENCOUNTER FOR SCREENING FOR OSTEOPOROSIS: Primary | ICD-10-CM

## 2023-03-01 ENCOUNTER — HOSPITAL ENCOUNTER (OUTPATIENT)
Dept: CT IMAGING | Facility: HOSPITAL | Age: 78
Discharge: HOME | End: 2023-03-01
Admitting: NURSE PRACTITIONER
Payer: MEDICARE

## 2023-03-01 DIAGNOSIS — C49.A0 GASTROINTESTINAL STROMAL TUMOR (GIST): Chronic | ICD-10-CM

## 2023-03-01 LAB — CREAT BLDA-MCNC: 0.9 MG/DL (ref 0.6–1.3)

## 2023-03-01 PROCEDURE — 25510000001 IOPAMIDOL 61 % SOLUTION: Performed by: NURSE PRACTITIONER

## 2023-03-01 PROCEDURE — 74177 CT ABD & PELVIS W/CONTRAST: CPT

## 2023-03-01 PROCEDURE — 82565 ASSAY OF CREATININE: CPT

## 2023-03-01 PROCEDURE — 71260 CT THORAX DX C+: CPT

## 2023-03-01 RX ADMIN — IOPAMIDOL 95 ML: 612 INJECTION, SOLUTION INTRAVENOUS at 12:19

## 2023-03-07 ENCOUNTER — OFFICE VISIT (OUTPATIENT)
Dept: ONCOLOGY | Facility: CLINIC | Age: 78
End: 2023-03-07
Payer: MEDICARE

## 2023-03-07 VITALS
TEMPERATURE: 97.1 F | WEIGHT: 137 LBS | DIASTOLIC BLOOD PRESSURE: 74 MMHG | RESPIRATION RATE: 18 BRPM | BODY MASS INDEX: 26.9 KG/M2 | SYSTOLIC BLOOD PRESSURE: 126 MMHG | OXYGEN SATURATION: 96 % | HEART RATE: 62 BPM | HEIGHT: 60 IN

## 2023-03-07 DIAGNOSIS — C49.A0 GASTROINTESTINAL STROMAL TUMOR (GIST): Chronic | ICD-10-CM

## 2023-03-07 DIAGNOSIS — C49.A0 GASTROINTESTINAL STROMAL TUMOR (GIST): Primary | Chronic | ICD-10-CM

## 2023-03-07 PROCEDURE — 99214 OFFICE O/P EST MOD 30 MIN: CPT | Performed by: INTERNAL MEDICINE

## 2023-03-07 NOTE — PROGRESS NOTES
CHIEF COMPLAINT: No new somatic complaints    Problem List:  Oncology/Hematology History Overview Note   1.  GI stromal tumor  2.  Paroxysmal atrial fibrillation found on heart monitor during colonoscopy 2015 previously diagnosed 2003 with spontaneous conversion previously.  Previously on Coumadin for stroke prophylaxis for 1 year and then discontinued.  Ablated 7/13/2016 Dr. Chow  3.  Hypertension  4.  Sigmoid hyperplastic polyp 11/11/2015 with Dr. Armando Wallis  5.  Hyperlipidemia      6.  Hypothyroidism  7.  History of ASHANTI/BSO    Oncology history timeline:  -6/11/2021 presented to emergency room with syncope and hematemesis.  No abdominal pain diarrhea or constipation.  No melena or hematochezia.  Has been taking low-dose aspirin.  No history of GI bleeding.  Prior colonoscopy 5 years previous.  Fully vaccinated for COVID-19.  CT abdomen pelvis showed large mass posterior body wall stomach 4.4 x 4.3 cm with liver, spleen, and pancreas otherwise unremarkable.  Hemoglobin 10.9 white count 17,200 platelets 224,000 otherwise unremarkable differential.  CMP shows normal liver enzymes and creatinine.  -6/12/2021 EGD Dr. Rebecca Gómez showed submucosal mass with ulceration at the fundus and a Mariann-Worthington tear with hiatal hernia.  Plans were made for follow-up on biopsy and outpatient EUS.  Pathology showed benign gastric epithelium with foveolar hyperplasia negative for metaplasia or dysplasia or malignancy or H. pylori.  CT head without contrast negative  -6/13/2021 hemoglobin 6.9  -7/7/2021 hemoglobin 9.5  -7/9/2021 admitted by Dr. Dr. Farmer for laparoscopic removal of gastric mass and possible gastrectomy laparoscopically.  Upon inspection of the stomach, gastric mass was in the upper lesser curvature 5 cm distal to the GE junction.  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  Positive for  and CD34.   "Negative for S100 and desmin.  -7/10/2021 hemoglobin 8.3 with normochromic normocytic indices and unremarkable differential.  -7/13/2021 discharged from hospital.  -7/26/2021 follow-up with Dr. Farmer as outpatient.  No postoperative issues.  With healing well.    -8/17/2021 [facility] medical oncology consultation: I reviewed Dr. Farmer's excellent care.  All tumors over 2 cm in size should be excised as was this case.  These tumors can be isodense on CT.  Hepatic metastases can be better visualized either with PET CT or MRI.  With the tumor over 2 cm in size, I will get completion staging with a CT of the chest with postoperative restaging CT abdomen pelvis to reestablish anatomy postoperatively and I think as explained below the overall risk testis is well enough to not put her through the PET.  Risk of progression free survival for a tumor 2-5 cm with less than 5 mitoses per 50 high-power fields arising from the stomach has a 98.1% rate of progression free survival.  NIH risk stratification for tumor of the size with less than 5 mitoses per 50 high-power fields would be considered a low risk for metastasis or recurrence regardless of primarysite, left ocular factors.  AJCC staging for gastric GIST's would categorize this stage Ia for tumor less than 5 cm with a low mitotic rate and observed rate of progressive disease from 0-2%.  [facility] GIST nomogram predicts a 91% 5-year probability of recurrence free survival after surgery alone, 96% at 2 years.  Though technically, adjuvant Gleevec has been approved for any tumor over 3 cm in size, there is no clear consistent consensus amongst expert groups as to what cutoff might constitute the lowest \"acceptable\" level of risk for metastasis or recurrence that would justify the use of adjuvant imatinib and it is not clear which one prognostic tool out-performs another.  Whether the results of molecular testing should be integrated into risk stratification " is also unclear. There is no data showing benefit from adjuvant Gleevec with positive risk stratification based on SDH, NF PDGFR alpha.  Gleevec is generally fairly well-tolerated and while the general recommendation is for adjuvant therapy for tumors over 3 cm in size, without further risk stratification this is a highly heterogeneous population based on mitotic index, primary site, molecular factors.  On balance, at age 75 with other comorbidities and the overall predicted low risk of metastasis with gastric primary under 5 cm with low mitotic rate, I would not recommend adjuvant imatinib.  There are no evidence-based guidelines for follow-up of completely resected GIST tumors.  General recommendation is for history and physical every 3 to 6 months for 5 years and then annually with a CT recommended every 3 to 6 months for 3 to 5 years and then annually as there can be late recurrences.  Other authors have recommended that, for low risk cases, every other year scan seems reasonable.  Further data  to guide us may arise in the future as more data is collected.  For now I will have her get completion staging CT chest with postoperative restaging/baseline CT abdomen pelvis and after that probably repeated in 6 months and then a year after that and then every other year from that point forward until she is old enough that she would not want us to intervene.  Endoscopic follow-up is not routine unless symptoms dictate.  We will follow up on her anemia as well.    -8/26/2021 data:  CT chest abdomen pelvis with contrast shows postsurgical changes with no intrathoracic abnormalities and low attenuation of hepatic steatosis without focal liver lesion and no adenopathy.   CBC unremarkable with hemoglobin 12.8.  CMP creatinine 0.79 with normal calcium 10.3 and normal liver enzymes, alkaline phosphatase, total protein, albumin, globulin, ratios.    -9/9/2021 [facility] medical oncology virtual visit: I reviewed the above data  with the patient.  No hint of disease at this junction.  Would not put her through an MRI or PET at this junction given the low likelihood of metastasis with her clinical features.  We will get her CT chest abdomen pelvis with contrast along with CBC and CMP in 6 months and then a year after that and then every other year from that point forward until she is old enough that she would not want us to intervene.  Endoscopic follow-up is not routine unless symptoms dictate.  She will see my nurse practitioner back in 6 months for virtual visit to go over this.  We will continue this surveillance until she is of an age by her determination that she would not want us to intervene should we find an abnormality as there can be late recurrences.    -3/1/2022 CT chest, abdomen and pelvis IMPRESSION:  1. Stable postsurgical changes at the stomach. No residual or recurrent  mass.   2. No findings of metastatic disease in the chest, abdomen, or pelvis.  3. Incidental chronic CT findings above including coronary  atherosclerotic calcifications and cholelithiasis.    -3/10/2022 [facility] Oncology clinic telemedicine follow-up:  Ms. Tillman continues to do well with no evidence of disease recurrence on current CT chest, abdomen and pelvis as reported above and no new worrisome symptoms.  We will continue surveillance as outlined by Dr. Arias at his 9/9/2021 visit with repeat CT chest, abdomen and pelvis with contrast along with CBC and CMP now in 1 year and if negative at that time we will go to every other year from that point forward.  We did discuss today her potassium which was a little low at 3.0.  She mentions that she has had trouble with her potassium in the past, she has had no diarrhea or any change in her medications.  I recommended she eat bananas or other foods with potassium and follow-up with Dr. Lorenzo and she states understanding and agreement.     Gastrointestinal stromal tumor (GIST) (HCC)   8/17/2021 Cancer Staged     Staging form: Gastrointestinal Stromal Tumor - Gastric And Omental Gist, AJCC 8th Edition  - Clinical: Stage IA (cT2, cN0, cM0, Mitotic Rate: Low) - Signed by Gus Arias MD on 2021         HISTORY OF PRESENT ILLNESS:  The patient is a 77 y.o. female, here for follow up on management of history of GI stromal tumor.  No signs or symptoms of recurrence    Past Medical History:   Diagnosis Date   • A-fib (HCC)    • Allergic rhinitis    • Anemia    • Delayed emergence from anesthesia    • Disease of thyroid gland    • GERD (gastroesophageal reflux disease)    • History of transfusion     NO REACTION   • Hyperlipidemia    • Hypertension    • Hypothyroidism    • PONV (postoperative nausea and vomiting)    • Postmenopausal    • Torn meniscus      Past Surgical History:   Procedure Laterality Date   • APPENDECTOMY     • CARDIAC ELECTROPHYSIOLOGY PROCEDURE N/A 2016    Procedure: PVA;  Surgeon: Brian Chow MD;  Location: [facility] EP INVASIVE LOCATION;  Service:    • CATARACT EXTRACTION     • CATARACT EXTRACTION     • CATARACT EXTRACTION     •  SECTION     • COLONOSCOPY     • ENDOSCOPY N/A 2021    Procedure: ESOPHAGOGASTRODUODENOSCOPY;  Surgeon: Rebecca Gómez MD;  Location: [facility] ENDOSCOPY;  Service: Gastroenterology;  Laterality: N/A;   • EYE SURGERY      BILATERAL CATARACTS REMOVED   • HYSTERECTOMY     • PARAESOPHAGEAL HERNIA REPAIR N/A 2021    Procedure: LAPAROSCOPIC REMOVAL OF GASTRIC MASS, LAPAROSCOPIC HIATAL HERNIA REPAIR;  Surgeon: Alex Farmer MD;  Location: [facility] OR;  Service: General;  Laterality: N/A;       No Known Allergies    Family History and Social History reviewed and changed as necessary    REVIEW OF SYSTEM:   No new somatic complaints    PHYSICAL EXAM:  Lungs clear heart regular rate and rhythm.  Abdomen exam benign    Vitals:    23 1008   BP: 126/74   Pulse: 62   Resp: 18   Temp: 97.1 °F (36.2 °C)   SpO2: 96%   Weight: 62.1 kg (137 lb)   Height: 152.4 cm  "(60\")     Vitals:    03/07/23 1008   PainSc: 0-No pain          ECOG score: 1           Vitals reviewed.    ECOG: (1) Restricted in Physically Strenuous Activity, Ambulatory & Able to Do Work of Light Nature    Lab Results   Component Value Date    HGB 14.0 03/01/2022    HCT 43.8 03/01/2022    MCV 92.8 03/01/2022     03/01/2022    WBC 11.30 (H) 03/01/2022    NEUTROABS 6.86 03/01/2022    LYMPHSABS 3.33 (H) 03/01/2022    MONOSABS 0.88 03/01/2022    EOSABS 0.14 03/01/2022    BASOSABS 0.06 03/01/2022       Lab Results   Component Value Date    GLUCOSE 82 03/01/2022    BUN 11 03/01/2022    CREATININE 0.90 03/01/2023     03/01/2022    K 3.0 (L) 03/01/2022    CL 99 03/01/2022    CO2 28.0 03/01/2022    CALCIUM 10.4 03/01/2022    PROTEINTOT 7.7 03/01/2022    ALBUMIN 4.40 03/01/2022    BILITOT 0.4 03/01/2022    ALKPHOS 96 03/01/2022    AST 22 03/01/2022    ALT 9 03/01/2022             ASSESSMENT & PLAN:  1.  GI stromal tumor  2.  Paroxysmal atrial fibrillation found on heart monitor during colonoscopy 2015 previously diagnosed 2003 with spontaneous conversion previously.  Previously on Coumadin for stroke prophylaxis for 1 year and then discontinued.  Ablated 7/13/2016 Dr. Chow  3.  Hypertension  4.  Sigmoid hyperplastic polyp 11/11/2015 with Dr. Armando Wallis  5.  Hyperlipidemia      6.  Hypothyroidism  7.  History of ASHANTI/BSO    Oncology history timeline:  -6/11/2021 presented to emergency room with syncope and hematemesis.  No abdominal pain diarrhea or constipation.  No melena or hematochezia.  Has been taking low-dose aspirin.  No history of GI bleeding.  Prior colonoscopy 5 years previous.  Fully vaccinated for COVID-19.  CT abdomen pelvis showed large mass posterior body wall stomach 4.4 x 4.3 cm with liver, spleen, and pancreas otherwise unremarkable.  Hemoglobin 10.9 white count 17,200 platelets 224,000 otherwise unremarkable differential.  CMP shows normal liver enzymes and creatinine.  -6/12/2021 EGD " Dr. Rebecca Gómez showed submucosal mass with ulceration at the fundus and a Mariann-Worthington tear with hiatal hernia.  Plans were made for follow-up on biopsy and outpatient EUS.  Pathology showed benign gastric epithelium with foveolar hyperplasia negative for metaplasia or dysplasia or malignancy or H. pylori.  CT head without contrast negative  -6/13/2021 hemoglobin 6.9  -7/7/2021 hemoglobin 9.5  -7/9/2021 admitted by Dr. Dr. Farmer for laparoscopic removal of gastric mass and possible gastrectomy laparoscopically.  Upon inspection of the stomach, gastric mass was in the upper lesser curvature 5 cm distal to the GE junction.  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  Positive for  and CD34.  Negative for S100 and desmin.  -7/10/2021 hemoglobin 8.3 with normochromic normocytic indices and unremarkable differential.  -7/13/2021 discharged from hospital.  -7/26/2021 follow-up with Dr. Farmer as outpatient.  No postoperative issues.  With healing well.    -8/17/2021 [facility] medical oncology consultation: I reviewed Dr. Farmer's excellent care.  All tumors over 2 cm in size should be excised as was this case.  These tumors can be isodense on CT.  Hepatic metastases can be better visualized either with PET CT or MRI.  With the tumor over 2 cm in size, I will get completion staging with a CT of the chest with postoperative restaging CT abdomen pelvis to reestablish anatomy postoperatively and I think as explained below the overall risk testis is well enough to not put her through the PET.  Risk of progression free survival for a tumor 2-5 cm with less than 5 mitoses per 50 high-power fields arising from the stomach has a 98.1% rate of progression free survival.  NIH risk stratification for tumor of the size with less than 5 mitoses per 50 high-power fields would be considered a low risk for metastasis or  "recurrence regardless of primarysite, left ocular factors.  AJCC staging for gastric GIST's would categorize this stage Ia for tumor less than 5 cm with a low mitotic rate and observed rate of progressive disease from 0-2%.  [facility] GIST nomogram predicts a 91% 5-year probability of recurrence free survival after surgery alone, 96% at 2 years.  Though technically, adjuvant Gleevec has been approved for any tumor over 3 cm in size, there is no clear consistent consensus amongst expert groups as to what cutoff might constitute the lowest \"acceptable\" level of risk for metastasis or recurrence that would justify the use of adjuvant imatinib and it is not clear which one prognostic tool out-performs another.  Whether the results of molecular testing should be integrated into risk stratification is also unclear. There is no data showing benefit from adjuvant Gleevec with positive risk stratification based on SDH, NF PDGFR alpha.  Gleevec is generally fairly well-tolerated and while the general recommendation is for adjuvant therapy for tumors over 3 cm in size, without further risk stratification this is a highly heterogeneous population based on mitotic index, primary site, molecular factors.  On balance, at age 75 with other comorbidities and the overall predicted low risk of metastasis with gastric primary under 5 cm with low mitotic rate, I would not recommend adjuvant imatinib.  There are no evidence-based guidelines for follow-up of completely resected GIST tumors.  General recommendation is for history and physical every 3 to 6 months for 5 years and then annually with a CT recommended every 3 to 6 months for 3 to 5 years and then annually as there can be late recurrences.  Other authors have recommended that, for low risk cases, every other year scan seems reasonable.  Further data  to guide us may arise in the future as more data is collected.  For now I will have her get completion staging CT " chest with postoperative restaging/baseline CT abdomen pelvis and after that probably repeated in 6 months and then a year after that and then every other year from that point forward until she is old enough that she would not want us to intervene.  Endoscopic follow-up is not routine unless symptoms dictate.  We will follow up on her anemia as well.    -8/26/2021 data:  CT chest abdomen pelvis with contrast shows postsurgical changes with no intrathoracic abnormalities and low attenuation of hepatic steatosis without focal liver lesion and no adenopathy.   CBC unremarkable with hemoglobin 12.8.  CMP creatinine 0.79 with normal calcium 10.3 and normal liver enzymes, alkaline phosphatase, total protein, albumin, globulin, ratios.    -9/9/2021 [facility] oncology virtual visit: I reviewed the above data with the patient.  No hint of disease at this junction.  Would not put her through an MRI or PET at this junction given the low likelihood of metastasis with her clinical features.  We will get her CT chest abdomen pelvis with contrast along with CBC and CMP in 6 months and then a year after that and then every other year from that point forward until she is old enough that she would not want us to intervene.  Endoscopic follow-up is not routine unless symptoms dictate.  She will see my nurse practitioner back in 6 months for virtual visit to go over this.  We will continue this surveillance until she is of an age by her determination that she would not want us to intervene should we find an abnormality as there can be late recurrences.    -3/1/2022 CT chest, abdomen and pelvis IMPRESSION:  1. Stable postsurgical changes at the stomach. No residual or recurrent  mass.   2. No findings of metastatic disease in the chest, abdomen, or pelvis.  3. Incidental chronic CT findings above including coronary  atherosclerotic calcifications and cholelithiasis.    -3/10/2022 [facility] Oncology clinic telemedicine follow-up:   Ms. Tillman continues to do well with no evidence of disease recurrence on current CT chest, abdomen and pelvis as reported above and no new worrisome symptoms.  We will continue surveillance as outlined by Dr. Arias at his 9/9/2021 visit with repeat CT chest, abdomen and pelvis with contrast along with CBC and CMP now in 1 year and if negative at that time we will go to every other year from that point forward.  We did discuss today her potassium which was a little low at 3.0.  She mentions that she has had trouble with her potassium in the past, she has had no diarrhea or any change in her medications.  I recommended she eat bananas or other foods with potassium and follow-up with Dr. Lorenzo and she states understanding and agreement.    -3/1/2023 [facility] medical oncology follow-up: No signs or symptoms of recurrence.I reviewed her CT chest abdomen pelvis with contrast from 3/1/2023 for which she had no evidence of disease recurrence.  We will repeat imaging again March 2025 just before she sees my nurse practitioner back.  She had a CBC and a CMP ordered which was not done and we will get that collected but otherwise we will follow-up on her in 2 years.  No hint of recurrent GI stromal tumor.  Needs to make sure she keeps up with all her other health maintenance including periodic screening endoscopies, pelvics, mammograms, cholesterol checks, hemoglobin A1c, etc. with primary care.    Total time of care today inclusive of time spent today prior to her arrival reviewing interval images and reports thereof and during visit translating to patient and interviewing her for signs or symptoms of recurrence and after visit instituting the plan as outlined above took 30 minutes of patient care time.  Gus Arias MD    03/07/2023

## 2023-03-08 LAB
ALBUMIN SERPL-MCNC: 4.4 G/DL (ref 3.5–5.2)
ALBUMIN/GLOB SERPL: 1.4 G/DL
ALP SERPL-CCNC: 79 U/L (ref 39–117)
ALT SERPL-CCNC: 14 U/L (ref 1–33)
AST SERPL-CCNC: 19 U/L (ref 1–32)
BASOPHILS # BLD AUTO: 0.04 10*3/MM3 (ref 0–0.2)
BASOPHILS NFR BLD AUTO: 0.6 % (ref 0–1.5)
BILIRUB SERPL-MCNC: 0.5 MG/DL (ref 0–1.2)
BUN SERPL-MCNC: 16 MG/DL (ref 8–23)
BUN/CREAT SERPL: 22.2 (ref 7–25)
CALCIUM SERPL-MCNC: 11.2 MG/DL (ref 8.6–10.5)
CHLORIDE SERPL-SCNC: 101 MMOL/L (ref 98–107)
CO2 SERPL-SCNC: 30 MMOL/L (ref 22–29)
CREAT SERPL-MCNC: 0.72 MG/DL (ref 0.57–1)
EGFRCR SERPLBLD CKD-EPI 2021: 86.2 ML/MIN/1.73
EOSINOPHIL # BLD AUTO: 0.1 10*3/MM3 (ref 0–0.4)
EOSINOPHIL NFR BLD AUTO: 1.5 % (ref 0.3–6.2)
ERYTHROCYTE [DISTWIDTH] IN BLOOD BY AUTOMATED COUNT: 12.7 % (ref 12.3–15.4)
GLOBULIN SER CALC-MCNC: 3.1 GM/DL
GLUCOSE SERPL-MCNC: 97 MG/DL (ref 65–99)
HCT VFR BLD AUTO: 41.2 % (ref 34–46.6)
HGB BLD-MCNC: 14 G/DL (ref 12–15.9)
IMM GRANULOCYTES # BLD AUTO: 0.01 10*3/MM3 (ref 0–0.05)
IMM GRANULOCYTES NFR BLD AUTO: 0.2 % (ref 0–0.5)
LYMPHOCYTES # BLD AUTO: 2.83 10*3/MM3 (ref 0.7–3.1)
LYMPHOCYTES NFR BLD AUTO: 43.5 % (ref 19.6–45.3)
MCH RBC QN AUTO: 30 PG (ref 26.6–33)
MCHC RBC AUTO-ENTMCNC: 34 G/DL (ref 31.5–35.7)
MCV RBC AUTO: 88.4 FL (ref 79–97)
MONOCYTES # BLD AUTO: 0.65 10*3/MM3 (ref 0.1–0.9)
MONOCYTES NFR BLD AUTO: 10 % (ref 5–12)
NEUTROPHILS # BLD AUTO: 2.87 10*3/MM3 (ref 1.7–7)
NEUTROPHILS NFR BLD AUTO: 44.2 % (ref 42.7–76)
NRBC BLD AUTO-RTO: 0.2 /100 WBC (ref 0–0.2)
PLATELET # BLD AUTO: 237 10*3/MM3 (ref 140–450)
POTASSIUM SERPL-SCNC: 4 MMOL/L (ref 3.5–5.2)
PROT SERPL-MCNC: 7.5 G/DL (ref 6–8.5)
RBC # BLD AUTO: 4.66 10*6/MM3 (ref 3.77–5.28)
SODIUM SERPL-SCNC: 141 MMOL/L (ref 136–145)
WBC # BLD AUTO: 6.5 10*3/MM3 (ref 3.4–10.8)

## 2025-02-21 ENCOUNTER — TELEPHONE (OUTPATIENT)
Dept: ONCOLOGY | Facility: CLINIC | Age: 80
End: 2025-02-21

## 2025-02-21 DIAGNOSIS — C49.A0 GASTROINTESTINAL STROMAL TUMOR (GIST): Primary | Chronic | ICD-10-CM

## 2025-02-21 NOTE — TELEPHONE ENCOUNTER
Caller: Kade Tillman    Relationship: Self    Best call back number: [phone]     What is the best time to reach you: ASAP    Who are you requesting to speak with (clinical staff, provider,  specific staff member): CLINICAL        What was the call regarding: PT SAYS SHE IS DUE TO HAVE HER CT SCANS OF THE ABD & PELVIS AND CHEST DONE THAT DR MARIA WANTS HER TO HAVE EVERY TWO YEARS.  SHE SPOKE WITH SCHEDULING ABOUT THIS BUT THEY ADVISED THE ORDERS THEY HAVE ARE  FOR THIS AND WILL NEED NEW ORDERS.  SHE WANTS TO TRY TO GET THIS DONE AT [place] AS SHE HAS MORE SUCCESS WITH SCHEDULING THERE.  PLEASE ADVISE.

## 2025-03-07 ENCOUNTER — HOSPITAL ENCOUNTER (OUTPATIENT)
Dept: CT IMAGING | Facility: HOSPITAL | Age: 80
Discharge: HOME | End: 2025-03-07
Payer: MEDICARE

## 2025-03-07 DIAGNOSIS — C49.A0 GASTROINTESTINAL STROMAL TUMOR (GIST): Chronic | ICD-10-CM

## 2025-03-07 PROCEDURE — 25510000001 IOPAMIDOL 61 % SOLUTION: Performed by: INTERNAL MEDICINE

## 2025-03-07 PROCEDURE — 74177 CT ABD & PELVIS W/CONTRAST: CPT

## 2025-03-07 PROCEDURE — 71260 CT THORAX DX C+: CPT

## 2025-03-07 RX ORDER — IOPAMIDOL 612 MG/ML
85 INJECTION, SOLUTION INTRAVASCULAR
Status: COMPLETED | OUTPATIENT
Start: 2025-03-07 | End: 2025-03-07

## 2025-03-07 RX ADMIN — IOPAMIDOL 85 ML: 612 INJECTION, SOLUTION INTRAVENOUS at 13:47

## 2025-03-11 ENCOUNTER — OFFICE VISIT (OUTPATIENT)
Dept: ONCOLOGY | Facility: CLINIC | Age: 80
End: 2025-03-11
Payer: MEDICARE

## 2025-03-11 VITALS
DIASTOLIC BLOOD PRESSURE: 74 MMHG | OXYGEN SATURATION: 96 % | HEART RATE: 62 BPM | WEIGHT: 139 LBS | SYSTOLIC BLOOD PRESSURE: 124 MMHG | TEMPERATURE: 97.5 F | BODY MASS INDEX: 27.15 KG/M2 | RESPIRATION RATE: 18 BRPM

## 2025-03-11 DIAGNOSIS — C49.A0 GASTROINTESTINAL STROMAL TUMOR (GIST): Primary | Chronic | ICD-10-CM

## 2025-03-11 DIAGNOSIS — C49.A0 GASTROINTESTINAL STROMAL TUMOR (GIST): Chronic | ICD-10-CM

## 2025-03-11 PROCEDURE — 99214 OFFICE O/P EST MOD 30 MIN: CPT | Performed by: INTERNAL MEDICINE

## 2025-03-11 PROCEDURE — 1160F RVW MEDS BY RX/DR IN RCRD: CPT | Performed by: INTERNAL MEDICINE

## 2025-03-11 PROCEDURE — 3074F SYST BP LT 130 MM HG: CPT | Performed by: INTERNAL MEDICINE

## 2025-03-11 PROCEDURE — 1159F MED LIST DOCD IN RCRD: CPT | Performed by: INTERNAL MEDICINE

## 2025-03-11 PROCEDURE — 3078F DIAST BP <80 MM HG: CPT | Performed by: INTERNAL MEDICINE

## 2025-03-11 PROCEDURE — 1126F AMNT PAIN NOTED NONE PRSNT: CPT | Performed by: INTERNAL MEDICINE

## 2025-03-11 NOTE — PROGRESS NOTES
CHIEF COMPLAINT: Follow-up GI stromal tumor    Problem List:  Oncology/Hematology History Overview Note   1.  GI stromal tumor  2.  Paroxysmal atrial fibrillation found on heart monitor during colonoscopy 2015 previously diagnosed 2003 with spontaneous conversion previously.  Previously on Coumadin for stroke prophylaxis for 1 year and then discontinued.  Ablated 7/13/2016 Dr. Chow  3.  Hypertension  4.  Sigmoid hyperplastic polyp 11/11/2015 with Dr. Armando Wallis  5.  Hyperlipidemia  6.  Hypothyroidism  7.  History of ASHANTI/BSO    Oncology history timeline:  -6/11/2021 presented to emergency room with syncope and hematemesis.  No abdominal pain diarrhea or constipation.  No melena or hematochezia.  Has been taking low-dose aspirin.  No history of GI bleeding.  Prior colonoscopy 5 years previous.  Fully vaccinated for COVID-19.  CT abdomen pelvis showed large mass posterior body wall stomach 4.4 x 4.3 cm with liver, spleen, and pancreas otherwise unremarkable.  Hemoglobin 10.9 white count 17,200 platelets 224,000 otherwise unremarkable differential.  CMP shows normal liver enzymes and creatinine.  -6/12/2021 EGD Dr. Rebecca Gómez showed submucosal mass with ulceration at the fundus and a Mariann-Worthington tear with hiatal hernia.  Plans were made for follow-up on biopsy and outpatient EUS.  Pathology showed benign gastric epithelium with foveolar hyperplasia negative for metaplasia or dysplasia or malignancy or H. pylori.  CT head without contrast negative  -6/13/2021 hemoglobin 6.9  -7/7/2021 hemoglobin 9.5  -7/9/2021 admitted by Dr. Dr. Farmer for laparoscopic removal of gastric mass and possible gastrectomy laparoscopically.  Upon inspection of the stomach, gastric mass was in the upper lesser curvature 5 cm distal to the GE junction.  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  Positive for  and CD34.  Negative  "for S100 and desmin.  -7/10/2021 hemoglobin 8.3 with normochromic normocytic indices and unremarkable differential.  -7/13/2021 discharged from hospital.  -7/26/2021 follow-up with Dr. Farmer as outpatient.  No postoperative issues.  With healing well.    -8/17/2021 [facility] medical oncology consultation: I reviewed Dr. Farmer's excellent care.  All tumors over 2 cm in size should be excised as was this case.  These tumors can be isodense on CT.  Hepatic metastases can be better visualized either with PET CT or MRI.  With the tumor over 2 cm in size, I will get completion staging with a CT of the chest with postoperative restaging CT abdomen pelvis to reestablish anatomy postoperatively and I think as explained below the overall risk testis is well enough to not put her through the PET.  Risk of progression free survival for a tumor 2-5 cm with less than 5 mitoses per 50 high-power fields arising from the stomach has a 98.1% rate of progression free survival.  NIH risk stratification for tumor of the size with less than 5 mitoses per 50 high-power fields would be considered a low risk for metastasis or recurrence regardless of primarysite, left ocular factors.  AJCC staging for gastric GIST's would categorize this stage Ia for tumor less than 5 cm with a low mitotic rate and observed rate of progressive disease from 0-2%.  [facility] GIST nomogram predicts a 91% 5-year probability of recurrence free survival after surgery alone, 96% at 2 years.  Though technically, adjuvant Gleevec has been approved for any tumor over 3 cm in size, there is no clear consistent consensus amongst expert groups as to what cutoff might constitute the lowest \"acceptable\" level of risk for metastasis or recurrence that would justify the use of adjuvant imatinib and it is not clear which one prognostic tool out-performs another.  Whether the results of molecular testing should be integrated into risk stratification is also " unclear. There is no data showing benefit from adjuvant Gleevec with positive risk stratification based on SDH, NF PDGFR alpha.  Gleevec is generally fairly well-tolerated and while the general recommendation is for adjuvant therapy for tumors over 3 cm in size, without further risk stratification this is a highly heterogeneous population based on mitotic index, primary site, molecular factors.  On balance, at age 75 with other comorbidities and the overall predicted low risk of metastasis with gastric primary under 5 cm with low mitotic rate, I would not recommend adjuvant imatinib.  There are no evidence-based guidelines for follow-up of completely resected GIST tumors.  General recommendation is for history and physical every 3 to 6 months for 5 years and then annually with a CT recommended every 3 to 6 months for 3 to 5 years and then annually as there can be late recurrences.  Other authors have recommended that, for low risk cases, every other year scan seems reasonable.  Further data  to guide us may arise in the future as more data is collected.  For now I will have her get completion staging CT chest with postoperative restaging/baseline CT abdomen pelvis and after that probably repeated in 6 months and then a year after that and then every other year from that point forward until she is old enough that she would not want us to intervene.  Endoscopic follow-up is not routine unless symptoms dictate.  We will follow up on her anemia as well.    -8/26/2021 data:  CT chest abdomen pelvis with contrast shows postsurgical changes with no intrathoracic abnormalities and low attenuation of hepatic steatosis without focal liver lesion and no adenopathy.   CBC unremarkable with hemoglobin 12.8.  CMP creatinine 0.79 with normal calcium 10.3 and normal liver enzymes, alkaline phosphatase, total protein, albumin, globulin, ratios.    -9/9/2021 [facility] medical oncology virtual visit: I reviewed the above data with the  patient.  No hint of disease at this junction.  Would not put her through an MRI or PET at this junction given the low likelihood of metastasis with her clinical features.  We will get her CT chest abdomen pelvis with contrast along with CBC and CMP in 6 months and then a year after that and then every other year from that point forward until she is old enough that she would not want us to intervene.  Endoscopic follow-up is not routine unless symptoms dictate.  She will see my nurse practitioner back in 6 months for virtual visit to go over this.  We will continue this surveillance until she is of an age by her determination that she would not want us to intervene should we find an abnormality as there can be late recurrences.    -3/1/2022 CT chest, abdomen and pelvis IMPRESSION:  1. Stable postsurgical changes at the stomach. No residual or recurrent  mass.   2. No findings of metastatic disease in the chest, abdomen, or pelvis.  3. Incidental chronic CT findings above including coronary  atherosclerotic calcifications and cholelithiasis.    -3/10/2022 [facility] Oncology clinic telemedicine follow-up:  Ms. Tillman continues to do well with no evidence of disease recurrence on current CT chest, abdomen and pelvis as reported above and no new worrisome symptoms.  We will continue surveillance as outlined by Dr. Arias at his 9/9/2021 visit with repeat CT chest, abdomen and pelvis with contrast along with CBC and CMP now in 1 year and if negative at that time we will go to every other year from that point forward.  We did discuss today her potassium which was a little low at 3.0.  She mentions that she has had trouble with her potassium in the past, she has had no diarrhea or any change in her medications.  I recommended she eat bananas or other foods with potassium and follow-up with Dr. Lorenzo and she states understanding and agreement.    -3/1/2023 [facility] medical oncology follow-up: No signs or symptoms of  recurrence.I reviewed her CT chest abdomen pelvis with contrast from 3/1/2023 for which she had no evidence of disease recurrence.  We will repeat imaging again March 2025 just before she sees my nurse practitioner back.  She had a CBC and a CMP ordered which was not done and we will get that collected but otherwise we will follow-up on her in 2 years.  No hint of recurrent GI stromal tumor.  Needs to make sure she keeps up with all her other health maintenance including periodic screening endoscopies, pelvics, mammograms, cholesterol checks, hemoglobin A1c, etc. with primary care.    - 3/7/2025 CT chest abdomen pelvis compared to March 2023 shows postsurgical changes of the stomach with no evidence of recurrence or metastasis.    -3/11/2025 [facility] oncology follow-up: No evidence of recurrence on imaging.  Will repeat this again in 2 years.  Will get her CBC and CMP today and prior to return in 2 years     Gastrointestinal stromal tumor (GIST)   8/17/2021 Cancer Staged    Staging form: Gastrointestinal Stromal Tumor - Gastric And Omental Gist, AJCC 8th Edition  - Clinical: Stage IA (cT2, cN0, cM0, Mitotic Rate: Low) - Signed by Gus Arias MD on 8/17/2021         HISTORY OF PRESENT ILLNESS:  The patient is a 79 y.o. female, here for follow up on management of history of GI stromal tumor.  No new somatic complaints.    Past Medical History:   Diagnosis Date    A-fib     Allergic rhinitis     Anemia     Delayed emergence from anesthesia     Disease of thyroid gland     GERD (gastroesophageal reflux disease)     History of transfusion     NO REACTION    Hyperlipidemia     Hypertension     Hypothyroidism     PONV (postoperative nausea and vomiting)     Postmenopausal     Torn meniscus      Past Surgical History:   Procedure Laterality Date    APPENDECTOMY      CARDIAC ELECTROPHYSIOLOGY PROCEDURE N/A 7/13/2016    Procedure: PVA;  Surgeon: Brian Chow MD;  Location: [facility] INVASIVE LOCATION;  Service:      CATARACT EXTRACTION      CATARACT EXTRACTION      CATARACT EXTRACTION       SECTION      COLONOSCOPY      ENDOSCOPY N/A 2021    Procedure: ESOPHAGOGASTRODUODENOSCOPY;  Surgeon: Rebecca Gómez MD;  Location: [facility] ENDOSCOPY;  Service: Gastroenterology;  Laterality: N/A;    EYE SURGERY      BILATERAL CATARACTS REMOVED    HYSTERECTOMY      PARAESOPHAGEAL HERNIA REPAIR N/A 2021    Procedure: LAPAROSCOPIC REMOVAL OF GASTRIC MASS, LAPAROSCOPIC HIATAL HERNIA REPAIR;  Surgeon: Alex Farmer MD;  Location: [facility] OR;  Service: General;  Laterality: N/A;       No Known Allergies    Family History and Social History reviewed and changed as necessary    REVIEW OF SYSTEM:   No new somatic complaints.  No change in the color caliber or consistency of stools    PHYSICAL EXAM:  No jaundice icterus or pallor.  No respiratory distress.    Vitals:    25   BP: 124/74   Pulse: 62   Resp: 18   Temp: 97.5 °F (36.4 °C)   SpO2: 96%   Weight: 63 kg (139 lb)     Vitals:    25   PainSc: 0-No pain                      Vitals reviewed.    ECOG: (0) Fully Active - Able to Carry On All Pre-disease Performance Without Restriction    Lab Results   Component Value Date    HGB 14.0 2023    HCT 41.2 2023    MCV 88.4 2023     2023    WBC 6.50 2023    NEUTROABS 2.87 2023    LYMPHSABS 2.83 2023    MONOSABS 0.65 2023    EOSABS 0.10 2023    BASOSABS 0.04 2023       Lab Results   Component Value Date    GLUCOSE 97 2023    BUN 16 2023    CREATININE 0.72 2023     2023    K 4.0 2023     2023    CO2 30.0 (H) 2023    CALCIUM 11.2 (H) 2023    PROTEINTOT 7.5 2023    ALBUMIN 4.4 2023    BILITOT 0.5 2023    ALKPHOS 79 2023    AST 19 2023    ALT 14 2023             ASSESSMENT & PLAN:  1.  GI stromal tumor  2.  Paroxysmal atrial fibrillation found on heart monitor  during colonoscopy 2015 previously diagnosed 2003 with spontaneous conversion previously.  Previously on Coumadin for stroke prophylaxis for 1 year and then discontinued.  Ablated 7/13/2016 Dr. Chow  3.  Hypertension  4.  Sigmoid hyperplastic polyp 11/11/2015 with Dr. Armando Wallis  5.  Hyperlipidemia  6.  Hypothyroidism  7.  History of ASHANTI/BSO    Oncology history timeline:  -6/11/2021 presented to emergency room with syncope and hematemesis.  No abdominal pain diarrhea or constipation.  No melena or hematochezia.  Has been taking low-dose aspirin.  No history of GI bleeding.  Prior colonoscopy 5 years previous.  Fully vaccinated for COVID-19.  CT abdomen pelvis showed large mass posterior body wall stomach 4.4 x 4.3 cm with liver, spleen, and pancreas otherwise unremarkable.  Hemoglobin 10.9 white count 17,200 platelets 224,000 otherwise unremarkable differential.  CMP shows normal liver enzymes and creatinine.  -6/12/2021 EGD Dr. Rebecca Gómez showed submucosal mass with ulceration at the fundus and a Mariann-Worthington tear with hiatal hernia.  Plans were made for follow-up on biopsy and outpatient EUS.  Pathology showed benign gastric epithelium with foveolar hyperplasia negative for metaplasia or dysplasia or malignancy or H. pylori.  CT head without contrast negative  -6/13/2021 hemoglobin 6.9  -7/7/2021 hemoglobin 9.5  -7/9/2021 admitted by Dr. Dr. Farmer for laparoscopic removal of gastric mass and possible gastrectomy laparoscopically.  Upon inspection of the stomach, gastric mass was in the upper lesser curvature 5 cm distal to the GE junction.  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  Positive for  and CD34.  Negative for S100 and desmin.  -7/10/2021 hemoglobin 8.3 with normochromic normocytic indices and unremarkable differential.  -7/13/2021 discharged from hospital.  -7/26/2021 follow-up with   "Marleny as outpatient.  No postoperative issues.  With healing well.    -8/17/2021 [facility] [place] medical oncology consultation: I reviewed Dr. Farmer's excellent care.  All tumors over 2 cm in size should be excised as was this case.  These tumors can be isodense on CT.  Hepatic metastases can be better visualized either with PET CT or MRI.  With the tumor over 2 cm in size, I will get completion staging with a CT of the chest with postoperative restaging CT abdomen pelvis to reestablish anatomy postoperatively and I think as explained below the overall risk testis is well enough to not put her through the PET.  Risk of progression free survival for a tumor 2-5 cm with less than 5 mitoses per 50 high-power fields arising from the stomach has a 98.1% rate of progression free survival.  NIH risk stratification for tumor of the size with less than 5 mitoses per 50 high-power fields would be considered a low risk for metastasis or recurrence regardless of primarysite, left ocular factors.  AJCC staging for gastric GIST's would categorize this stage Ia for tumor less than 5 cm with a low mitotic rate and observed rate of progressive disease from 0-2%.  [facility] GIST nomogram predicts a 91% 5-year probability of recurrence free survival after surgery alone, 96% at 2 years.  Though technically, adjuvant Gleevec has been approved for any tumor over 3 cm in size, there is no clear consistent consensus amongst expert groups as to what cutoff might constitute the lowest \"acceptable\" level of risk for metastasis or recurrence that would justify the use of adjuvant imatinib and it is not clear which one prognostic tool out-performs another.  Whether the results of molecular testing should be integrated into risk stratification is also unclear. There is no data showing benefit from adjuvant Gleevec with positive risk stratification based on SDH, NF PDGFR alpha.  Gleevec is generally fairly well-tolerated and while the " general recommendation is for adjuvant therapy for tumors over 3 cm in size, without further risk stratification this is a highly heterogeneous population based on mitotic index, primary site, molecular factors.  On balance, at age 75 with other comorbidities and the overall predicted low risk of metastasis with gastric primary under 5 cm with low mitotic rate, I would not recommend adjuvant imatinib.  There are no evidence-based guidelines for follow-up of completely resected GIST tumors.  General recommendation is for history and physical every 3 to 6 months for 5 years and then annually with a CT recommended every 3 to 6 months for 3 to 5 years and then annually as there can be late recurrences.  Other authors have recommended that, for low risk cases, every other year scan seems reasonable.  Further data  to guide us may arise in the future as more data is collected.  For now I will have her get completion staging CT chest with postoperative restaging/baseline CT abdomen pelvis and after that probably repeated in 6 months and then a year after that and then every other year from that point forward until she is old enough that she would not want us to intervene.  Endoscopic follow-up is not routine unless symptoms dictate.  We will follow up on her anemia as well.    -8/26/2021 data:  CT chest abdomen pelvis with contrast shows postsurgical changes with no intrathoracic abnormalities and low attenuation of hepatic steatosis without focal liver lesion and no adenopathy.   CBC unremarkable with hemoglobin 12.8.  CMP creatinine 0.79 with normal calcium 10.3 and normal liver enzymes, alkaline phosphatase, total protein, albumin, globulin, ratios.    -9/9/2021 [facility] medical oncology virtual visit: I reviewed the above data with the patient.  No hint of disease at this junction.  Would not put her through an MRI or PET at this junction given the low likelihood of metastasis with her clinical features.  We will get  her CT chest abdomen pelvis with contrast along with CBC and CMP in 6 months and then a year after that and then every other year from that point forward until she is old enough that she would not want us to intervene.  Endoscopic follow-up is not routine unless symptoms dictate.  She will see my nurse practitioner back in 6 months for virtual visit to go over this.  We will continue this surveillance until she is of an age by her determination that she would not want us to intervene should we find an abnormality as there can be late recurrences.    -3/1/2022 CT chest, abdomen and pelvis IMPRESSION:  1. Stable postsurgical changes at the stomach. No residual or recurrent  mass.   2. No findings of metastatic disease in the chest, abdomen, or pelvis.  3. Incidental chronic CT findings above including coronary  atherosclerotic calcifications and cholelithiasis.    -3/10/2022 [facility] Oncology clinic telemedicine follow-up:  Ms. Tillman continues to do well with no evidence of disease recurrence on current CT chest, abdomen and pelvis as reported above and no new worrisome symptoms.  We will continue surveillance as outlined by Dr. Arias at his 9/9/2021 visit with repeat CT chest, abdomen and pelvis with contrast along with CBC and CMP now in 1 year and if negative at that time we will go to every other year from that point forward.  We did discuss today her potassium which was a little low at 3.0.  She mentions that she has had trouble with her potassium in the past, she has had no diarrhea or any change in her medications.  I recommended she eat bananas or other foods with potassium and follow-up with Dr. Lorenzo and she states understanding and agreement.    -3/1/2023 [facility] medical oncology follow-up: No signs or symptoms of recurrence.I reviewed her CT chest abdomen pelvis with contrast from 3/1/2023 for which she had no evidence of disease recurrence.  We will repeat imaging again March 2025 just before she sees my  nurse practitioner back.  She had a CBC and a CMP ordered which was not done and we will get that collected but otherwise we will follow-up on her in 2 years.  No hint of recurrent GI stromal tumor.  Needs to make sure she keeps up with all her other health maintenance including periodic screening endoscopies, pelvics, mammograms, cholesterol checks, hemoglobin A1c, etc. with primary care.    - 3/7/2025 CT chest abdomen pelvis compared to March 2023 shows postsurgical changes of the stomach with no evidence of recurrence or metastasis.    -3/11/2025 [facility] follow-up: No evidence of recurrence on imaging.  Will repeat this again in 2 years.  Will get her CBC and CMP today and prior to return in 2 years    Total time of care today inclusive of time spent today prior to patient's arrival reviewing interval data and during visit translating to patient putting forth plan as outlined above took 35 minutes patient care time throughout the day today.  Gus Arias MD    03/11/2025

## 2025-03-11 NOTE — LETTER
March 11, 2025     Boaz Lorenzo MD  [address] [facility] [place] [address]    Patient: Kade Tillman   YOB: 1945   Date of Visit: 3/11/2025     Dear Boaz Lorenzo MD:       Thank you for referring Kade Tillman to me for evaluation. Below are the relevant portions of my assessment and plan of care.    If you have questions, please do not hesitate to call me. I look forward to following Kade along with you.         Sincerely,        Gus Arias MD        CC: MD Rajeev Masterson MD Hicks, Lee G, MD  03/11/25 1024  Sign when Signing Visit  CHIEF COMPLAINT: Follow-up GI stromal tumor    Problem List:  Oncology/Hematology History Overview Note   1.  GI stromal tumor  2.  Paroxysmal atrial fibrillation found on heart monitor during colonoscopy 2015 previously diagnosed 2003 with spontaneous conversion previously.  Previously on Coumadin for stroke prophylaxis for 1 year and then discontinued.  Ablated 7/13/2016 Dr. Chow  3.  Hypertension  4.  Sigmoid hyperplastic polyp 11/11/2015 with Dr. Armando Wallis  5.  Hyperlipidemia  6.  Hypothyroidism  7.  History of ASHANTI/BSO    Oncology history timeline:  -6/11/2021 presented to emergency room with syncope and hematemesis.  No abdominal pain diarrhea or constipation.  No melena or hematochezia.  Has been taking low-dose aspirin.  No history of GI bleeding.  Prior colonoscopy 5 years previous.  Fully vaccinated for COVID-19.  CT abdomen pelvis showed large mass posterior body wall stomach 4.4 x 4.3 cm with liver, spleen, and pancreas otherwise unremarkable.  Hemoglobin 10.9 white count 17,200 platelets 224,000 otherwise unremarkable differential.  CMP shows normal liver enzymes and creatinine.  -6/12/2021 EGD Dr. Rebecca Gómez showed submucosal mass with ulceration at the fundus and a Mariann-Worthington tear with hiatal hernia.  Plans were made for follow-up on biopsy and outpatient EUS.  Pathology showed benign gastric  epithelium with foveolar hyperplasia negative for metaplasia or dysplasia or malignancy or H. pylori.  CT head without contrast negative  -6/13/2021 hemoglobin 6.9  -7/7/2021 hemoglobin 9.5  -7/9/2021 admitted by Dr. Dr. Farmer for laparoscopic removal of gastric mass and possible gastrectomy laparoscopically.  Upon inspection of the stomach, gastric mass was in the upper lesser curvature 5 cm distal to the GE junction.  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  Positive for  and CD34.  Negative for S100 and desmin.  -7/10/2021 hemoglobin 8.3 with normochromic normocytic indices and unremarkable differential.  -7/13/2021 discharged from hospital.  -7/26/2021 follow-up with Dr. Farmer as outpatient.  No postoperative issues.  With healing well.    -8/17/2021 [facility] medical oncology consultation: I reviewed Dr. Farmer's excellent care.  All tumors over 2 cm in size should be excised as was this case.  These tumors can be isodense on CT.  Hepatic metastases can be better visualized either with PET CT or MRI.  With the tumor over 2 cm in size, I will get completion staging with a CT of the chest with postoperative restaging CT abdomen pelvis to reestablish anatomy postoperatively and I think as explained below the overall risk testis is well enough to not put her through the PET.  Risk of progression free survival for a tumor 2-5 cm with less than 5 mitoses per 50 high-power fields arising from the stomach has a 98.1% rate of progression free survival.  NIH risk stratification for tumor of the size with less than 5 mitoses per 50 high-power fields would be considered a low risk for metastasis or recurrence regardless of primarysite, left ocular factors.  AJCC staging for gastric GIST's would categorize this stage Ia for tumor less than 5 cm with a low mitotic rate and observed rate of progressive disease  "from 0-2%.  [facility] GIST nomogram predicts a 91% 5-year probability of recurrence free survival after surgery alone, 96% at 2 years.  Though technically, adjuvant Gleevec has been approved for any tumor over 3 cm in size, there is no clear consistent consensus amongst expert groups as to what cutoff might constitute the lowest \"acceptable\" level of risk for metastasis or recurrence that would justify the use of adjuvant imatinib and it is not clear which one prognostic tool out-performs another.  Whether the results of molecular testing should be integrated into risk stratification is also unclear. There is no data showing benefit from adjuvant Gleevec with positive risk stratification based on SDH, NF PDGFR alpha.  Gleevec is generally fairly well-tolerated and while the general recommendation is for adjuvant therapy for tumors over 3 cm in size, without further risk stratification this is a highly heterogeneous population based on mitotic index, primary site, molecular factors.  On balance, at age 75 with other comorbidities and the overall predicted low risk of metastasis with gastric primary under 5 cm with low mitotic rate, I would not recommend adjuvant imatinib.  There are no evidence-based guidelines for follow-up of completely resected GIST tumors.  General recommendation is for history and physical every 3 to 6 months for 5 years and then annually with a CT recommended every 3 to 6 months for 3 to 5 years and then annually as there can be late recurrences.  Other authors have recommended that, for low risk cases, every other year scan seems reasonable.  Further data  to guide us may arise in the future as more data is collected.  For now I will have her get completion staging CT chest with postoperative restaging/baseline CT abdomen pelvis and after that probably repeated in 6 months and then a year after that and then every other year from that point forward until she is old enough that " she would not want us to intervene.  Endoscopic follow-up is not routine unless symptoms dictate.  We will follow up on her anemia as well.    -8/26/2021 data:  CT chest abdomen pelvis with contrast shows postsurgical changes with no intrathoracic abnormalities and low attenuation of hepatic steatosis without focal liver lesion and no adenopathy.   CBC unremarkable with hemoglobin 12.8.  CMP creatinine 0.79 with normal calcium 10.3 and normal liver enzymes, alkaline phosphatase, total protein, albumin, globulin, ratios.    -9/9/2021 [facility] medical oncology virtual visit: I reviewed the above data with the patient.  No hint of disease at this junction.  Would not put her through an MRI or PET at this junction given the low likelihood of metastasis with her clinical features.  We will get her CT chest abdomen pelvis with contrast along with CBC and CMP in 6 months and then a year after that and then every other year from that point forward until she is old enough that she would not want us to intervene.  Endoscopic follow-up is not routine unless symptoms dictate.  She will see my nurse practitioner back in 6 months for virtual visit to go over this.  We will continue this surveillance until she is of an age by her determination that she would not want us to intervene should we find an abnormality as there can be late recurrences.    -3/1/2022 CT chest, abdomen and pelvis IMPRESSION:  1. Stable postsurgical changes at the stomach. No residual or recurrent  mass.   2. No findings of metastatic disease in the chest, abdomen, or pelvis.  3. Incidental chronic CT findings above including coronary  atherosclerotic calcifications and cholelithiasis.    -3/10/2022 [facility] Oncology clinic telemedicine follow-up:  Ms. Tillman continues to do well with no evidence of disease recurrence on current CT chest, abdomen and pelvis as reported above and no new worrisome symptoms.  We will continue surveillance as outlined by Dr. Arias  at his 9/9/2021 visit with repeat CT chest, abdomen and pelvis with contrast along with CBC and CMP now in 1 year and if negative at that time we will go to every other year from that point forward.  We did discuss today her potassium which was a little low at 3.0.  She mentions that she has had trouble with her potassium in the past, she has had no diarrhea or any change in her medications.  I recommended she eat bananas or other foods with potassium and follow-up with Dr. Lorenzo and she states understanding and agreement.    -3/1/2023 [facility] medical oncology follow-up: No signs or symptoms of recurrence.I reviewed her CT chest abdomen pelvis with contrast from 3/1/2023 for which she had no evidence of disease recurrence.  We will repeat imaging again March 2025 just before she sees my nurse practitioner back.  She had a CBC and a CMP ordered which was not done and we will get that collected but otherwise we will follow-up on her in 2 years.  No hint of recurrent GI stromal tumor.  Needs to make sure she keeps up with all her other health maintenance including periodic screening endoscopies, pelvics, mammograms, cholesterol checks, hemoglobin A1c, etc. with primary care.    - 3/7/2025 CT chest abdomen pelvis compared to March 2023 shows postsurgical changes of the stomach with no evidence of recurrence or metastasis.    -3/11/2025 [facility] oncology follow-up: No evidence of recurrence on imaging.  Will repeat this again in 2 years.  Will get her CBC and CMP today and prior to return in 2 years     Gastrointestinal stromal tumor (GIST)   8/17/2021 Cancer Staged    Staging form: Gastrointestinal Stromal Tumor - Gastric And Omental Gist, AJCC 8th Edition  - Clinical: Stage IA (cT2, cN0, cM0, Mitotic Rate: Low) - Signed by Gus Arias MD on 8/17/2021         HISTORY OF PRESENT ILLNESS:  The patient is a 79 y.o. female, here for follow up on management of history of GI stromal tumor.  No new somatic  complaints.    Past Medical History:   Diagnosis Date   • A-fib    • Allergic rhinitis    • Anemia    • Delayed emergence from anesthesia    • Disease of thyroid gland    • GERD (gastroesophageal reflux disease)    • History of transfusion     NO REACTION   • Hyperlipidemia    • Hypertension    • Hypothyroidism    • PONV (postoperative nausea and vomiting)    • Postmenopausal    • Torn meniscus      Past Surgical History:   Procedure Laterality Date   • APPENDECTOMY     • CARDIAC ELECTROPHYSIOLOGY PROCEDURE N/A 2016    Procedure: PVA;  Surgeon: Brian Chow MD;  Location:  [facility];  Service:    • CATARACT EXTRACTION     • CATARACT EXTRACTION     • CATARACT EXTRACTION     •  SECTION     • COLONOSCOPY     • ENDOSCOPY N/A 2021    Procedure: ESOPHAGOGASTRODUODENOSCOPY;  Surgeon: Rebecca Gómez MD;  Location:  [facility];  Service: Gastroenterology;  Laterality: N/A;   • EYE SURGERY      BILATERAL CATARACTS REMOVED   • HYSTERECTOMY     • PARAESOPHAGEAL HERNIA REPAIR N/A 2021    Procedure: LAPAROSCOPIC REMOVAL OF GASTRIC MASS, LAPAROSCOPIC HIATAL HERNIA REPAIR;  Surgeon: Alex Farmer MD;  Location: [facility] OR;  Service: General;  Laterality: N/A;       No Known Allergies    Family History and Social History reviewed and changed as necessary    REVIEW OF SYSTEM:   No new somatic complaints.  No change in the color caliber or consistency of stools    PHYSICAL EXAM:  No jaundice icterus or pallor.  No respiratory distress.    Vitals:    25 09   BP: 124/74   Pulse: 62   Resp: 18   Temp: 97.5 °F (36.4 °C)   SpO2: 96%   Weight: 63 kg (139 lb)     Vitals:    25 09   PainSc: 0-No pain                      Vitals reviewed.    ECOG: (0) Fully Active - Able to Carry On All Pre-disease Performance Without Restriction    Lab Results   Component Value Date    HGB 14.0 2023    HCT 41.2 2023    MCV 88.4 2023     2023    WBC 6.50  03/07/2023    NEUTROABS 2.87 03/07/2023    LYMPHSABS 2.83 03/07/2023    MONOSABS 0.65 03/07/2023    EOSABS 0.10 03/07/2023    BASOSABS 0.04 03/07/2023       Lab Results   Component Value Date    GLUCOSE 97 03/07/2023    BUN 16 03/07/2023    CREATININE 0.72 03/07/2023     03/07/2023    K 4.0 03/07/2023     03/07/2023    CO2 30.0 (H) 03/07/2023    CALCIUM 11.2 (H) 03/07/2023    PROTEINTOT 7.5 03/07/2023    ALBUMIN 4.4 03/07/2023    BILITOT 0.5 03/07/2023    ALKPHOS 79 03/07/2023    AST 19 03/07/2023    ALT 14 03/07/2023             ASSESSMENT & PLAN:  1.  GI stromal tumor  2.  Paroxysmal atrial fibrillation found on heart monitor during colonoscopy 2015 previously diagnosed 2003 with spontaneous conversion previously.  Previously on Coumadin for stroke prophylaxis for 1 year and then discontinued.  Ablated 7/13/2016 Dr. Chow  3.  Hypertension  4.  Sigmoid hyperplastic polyp 11/11/2015 with Dr. Armando Wallis  5.  Hyperlipidemia  6.  Hypothyroidism  7.  History of ASHANTI/BSO    Oncology history timeline:  -6/11/2021 presented to emergency room with syncope and hematemesis.  No abdominal pain diarrhea or constipation.  No melena or hematochezia.  Has been taking low-dose aspirin.  No history of GI bleeding.  Prior colonoscopy 5 years previous.  Fully vaccinated for COVID-19.  CT abdomen pelvis showed large mass posterior body wall stomach 4.4 x 4.3 cm with liver, spleen, and pancreas otherwise unremarkable.  Hemoglobin 10.9 white count 17,200 platelets 224,000 otherwise unremarkable differential.  CMP shows normal liver enzymes and creatinine.  -6/12/2021 EGD Dr. Rebecca Gómez showed submucosal mass with ulceration at the fundus and a Mariann-Worthington tear with hiatal hernia.  Plans were made for follow-up on biopsy and outpatient EUS.  Pathology showed benign gastric epithelium with foveolar hyperplasia negative for metaplasia or dysplasia or malignancy or H. pylori.  CT head without contrast negative  -6/13/2021  hemoglobin 6.9  -7/7/2021 hemoglobin 9.5  -7/9/2021 admitted by Dr. Dr. Farmer for laparoscopic removal of gastric mass and possible gastrectomy laparoscopically.  Upon inspection of the stomach, gastric mass was in the upper lesser curvature 5 cm distal to the GE junction.  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  Positive for  and CD34.  Negative for S100 and desmin.  -7/10/2021 hemoglobin 8.3 with normochromic normocytic indices and unremarkable differential.  -7/13/2021 discharged from hospital.  -7/26/2021 follow-up with Dr. Farmer as outpatient.  No postoperative issues.  With healing well.    -8/17/2021 [facility] medical oncology consultation: I reviewed Dr. Farmer's excellent care.  All tumors over 2 cm in size should be excised as was this case.  These tumors can be isodense on CT.  Hepatic metastases can be better visualized either with PET CT or MRI.  With the tumor over 2 cm in size, I will get completion staging with a CT of the chest with postoperative restaging CT abdomen pelvis to reestablish anatomy postoperatively and I think as explained below the overall risk testis is well enough to not put her through the PET.  Risk of progression free survival for a tumor 2-5 cm with less than 5 mitoses per 50 high-power fields arising from the stomach has a 98.1% rate of progression free survival.  NIH risk stratification for tumor of the size with less than 5 mitoses per 50 high-power fields would be considered a low risk for metastasis or recurrence regardless of primarysite, left ocular factors.  AJCC staging for gastric GIST's would categorize this stage Ia for tumor less than 5 cm with a low mitotic rate and observed rate of progressive disease from 0-2%.  [facility] GIST nomogram predicts a 91% 5-year probability of recurrence free survival after surgery alone, 96% at 2  "years.  Though technically, adjuvant Gleevec has been approved for any tumor over 3 cm in size, there is no clear consistent consensus amongst expert groups as to what cutoff might constitute the lowest \"acceptable\" level of risk for metastasis or recurrence that would justify the use of adjuvant imatinib and it is not clear which one prognostic tool out-performs another.  Whether the results of molecular testing should be integrated into risk stratification is also unclear. There is no data showing benefit from adjuvant Gleevec with positive risk stratification based on SDH, NF PDGFR alpha.  Gleevec is generally fairly well-tolerated and while the general recommendation is for adjuvant therapy for tumors over 3 cm in size, without further risk stratification this is a highly heterogeneous population based on mitotic index, primary site, molecular factors.  On balance, at age 75 with other comorbidities and the overall predicted low risk of metastasis with gastric primary under 5 cm with low mitotic rate, I would not recommend adjuvant imatinib.  There are no evidence-based guidelines for follow-up of completely resected GIST tumors.  General recommendation is for history and physical every 3 to 6 months for 5 years and then annually with a CT recommended every 3 to 6 months for 3 to 5 years and then annually as there can be late recurrences.  Other authors have recommended that, for low risk cases, every other year scan seems reasonable.  Further data  to guide us may arise in the future as more data is collected.  For now I will have her get completion staging CT chest with postoperative restaging/baseline CT abdomen pelvis and after that probably repeated in 6 months and then a year after that and then every other year from that point forward until she is old enough that she would not want us to intervene.  Endoscopic follow-up is not routine unless symptoms dictate.  We will follow up on her anemia as " well.    -8/26/2021 data:  CT chest abdomen pelvis with contrast shows postsurgical changes with no intrathoracic abnormalities and low attenuation of hepatic steatosis without focal liver lesion and no adenopathy.   CBC unremarkable with hemoglobin 12.8.  CMP creatinine 0.79 with normal calcium 10.3 and normal liver enzymes, alkaline phosphatase, total protein, albumin, globulin, ratios.    -9/9/2021 [facility] medical oncology virtual visit: I reviewed the above data with the patient.  No hint of disease at this junction.  Would not put her through an MRI or PET at this junction given the low likelihood of metastasis with her clinical features.  We will get her CT chest abdomen pelvis with contrast along with CBC and CMP in 6 months and then a year after that and then every other year from that point forward until she is old enough that she would not want us to intervene.  Endoscopic follow-up is not routine unless symptoms dictate.  She will see my nurse practitioner back in 6 months for virtual visit to go over this.  We will continue this surveillance until she is of an age by her determination that she would not want us to intervene should we find an abnormality as there can be late recurrences.    -3/1/2022 CT chest, abdomen and pelvis IMPRESSION:  1. Stable postsurgical changes at the stomach. No residual or recurrent  mass.   2. No findings of metastatic disease in the chest, abdomen, or pelvis.  3. Incidental chronic CT findings above including coronary  atherosclerotic calcifications and cholelithiasis.    -3/10/2022 [facility] Oncology clinic telemedicine follow-up:  Ms. Tillman continues to do well with no evidence of disease recurrence on current CT chest, abdomen and pelvis as reported above and no new worrisome symptoms.  We will continue surveillance as outlined by Dr. Arias at his 9/9/2021 visit with repeat CT chest, abdomen and pelvis with contrast along with CBC and CMP now in 1 year and if negative at  that time we will go to every other year from that point forward.  We did discuss today her potassium which was a little low at 3.0.  She mentions that she has had trouble with her potassium in the past, she has had no diarrhea or any change in her medications.  I recommended she eat bananas or other foods with potassium and follow-up with Dr. Lorenzo and she states understanding and agreement.    -3/1/2023 [facility]ology follow-up: No signs or symptoms of recurrence.I reviewed her CT chest abdomen pelvis with contrast from 3/1/2023 for which she had no evidence of disease recurrence.  We will repeat imaging again March 2025 just before she sees my nurse practitioner back.  She had a CBC and a CMP ordered which was not done and we will get that collected but otherwise we will follow-up on her in 2 years.  No hint of recurrent GI stromal tumor.  Needs to make sure she keeps up with all her other health maintenance including periodic screening endoscopies, pelvics, mammograms, cholesterol checks, hemoglobin A1c, etc. with primary care.    - 3/7/2025 CT chest abdomen pelvis compared to March 2023 shows postsurgical changes of the stomach with no evidence of recurrence or metastasis.    -3/11/2025 [facility]ology follow-up: No evidence of recurrence on imaging.  Will repeat this again in 2 years.  Will get her CBC and CMP today and prior to return in 2 years    Total time of care today inclusive of time spent today prior to patient's arrival reviewing interval data and during visit translating to patient putting forth plan as outlined above took 35 minutes patient care time throughout the day today.  Gus Arias MD    03/11/2025

## 2025-03-12 LAB
ALBUMIN SERPL-MCNC: 4.4 G/DL (ref 3.5–5.2)
ALBUMIN/GLOB SERPL: 1.4 G/DL
ALP SERPL-CCNC: 86 U/L (ref 39–117)
ALT SERPL-CCNC: 16 U/L (ref 1–33)
AST SERPL-CCNC: 19 U/L (ref 1–32)
BASOPHILS # BLD AUTO: 0.06 10*3/MM3 (ref 0–0.2)
BASOPHILS NFR BLD AUTO: 0.9 % (ref 0–1.5)
BILIRUB SERPL-MCNC: 0.5 MG/DL (ref 0–1.2)
BUN SERPL-MCNC: 16 MG/DL (ref 8–23)
BUN/CREAT SERPL: 21.1 (ref 7–25)
CALCIUM SERPL-MCNC: 11.4 MG/DL (ref 8.6–10.5)
CHLORIDE SERPL-SCNC: 99 MMOL/L (ref 98–107)
CO2 SERPL-SCNC: 29.7 MMOL/L (ref 22–29)
CREAT SERPL-MCNC: 0.76 MG/DL (ref 0.57–1)
EGFRCR SERPLBLD CKD-EPI 2021: 79.8 ML/MIN/1.73
EOSINOPHIL # BLD AUTO: 0.12 10*3/MM3 (ref 0–0.4)
EOSINOPHIL NFR BLD AUTO: 1.8 % (ref 0.3–6.2)
ERYTHROCYTE [DISTWIDTH] IN BLOOD BY AUTOMATED COUNT: 12.6 % (ref 12.3–15.4)
GLOBULIN SER CALC-MCNC: 3.1 GM/DL
GLUCOSE SERPL-MCNC: 97 MG/DL (ref 65–99)
HCT VFR BLD AUTO: 44.6 % (ref 34–46.6)
HGB BLD-MCNC: 14.9 G/DL (ref 12–15.9)
IMM GRANULOCYTES # BLD AUTO: 0.03 10*3/MM3 (ref 0–0.05)
IMM GRANULOCYTES NFR BLD AUTO: 0.4 % (ref 0–0.5)
LYMPHOCYTES # BLD AUTO: 2.93 10*3/MM3 (ref 0.7–3.1)
LYMPHOCYTES NFR BLD AUTO: 43.5 % (ref 19.6–45.3)
MCH RBC QN AUTO: 30.2 PG (ref 26.6–33)
MCHC RBC AUTO-ENTMCNC: 33.4 G/DL (ref 31.5–35.7)
MCV RBC AUTO: 90.5 FL (ref 79–97)
MONOCYTES # BLD AUTO: 0.54 10*3/MM3 (ref 0.1–0.9)
MONOCYTES NFR BLD AUTO: 8 % (ref 5–12)
NEUTROPHILS # BLD AUTO: 3.05 10*3/MM3 (ref 1.7–7)
NEUTROPHILS NFR BLD AUTO: 45.4 % (ref 42.7–76)
NRBC BLD AUTO-RTO: 0 /100 WBC (ref 0–0.2)
PLATELET # BLD AUTO: 252 10*3/MM3 (ref 140–450)
POTASSIUM SERPL-SCNC: 3.8 MMOL/L (ref 3.5–5.2)
PROT SERPL-MCNC: 7.5 G/DL (ref 6–8.5)
RBC # BLD AUTO: 4.93 10*6/MM3 (ref 3.77–5.28)
SODIUM SERPL-SCNC: 142 MMOL/L (ref 136–145)
WBC # BLD AUTO: 6.73 10*3/MM3 (ref 3.4–10.8)

## 2025-07-08 ENCOUNTER — TELEPHONE (OUTPATIENT)
Dept: ONCOLOGY | Facility: CLINIC | Age: 80
End: 2025-07-08
Payer: MEDICARE

## 2025-07-08 NOTE — TELEPHONE ENCOUNTER
Returned patient's call. Dr Arias stated she should go ahead with the testing that was suggested and we will see her if it results cancerous. Patient voiced understanding and will move forward with testing at [place].

## 2025-07-08 NOTE — TELEPHONE ENCOUNTER
Caller: Kade Tillman    Relationship: Self    Best call back number: [phone]     What is the best time to reach you: ASAP    Who are you requesting to speak with (clinical staff, provider,  specific staff member): CLINICAL        What was the call regarding: PT HAD MAMMOGRAM, SOMETHING WAS SEEN IN RIGHT BREAST, SHE WAS CALLED BACK TO [facility] FOR ANOTHER TEST, WAS ADVISED THEY WERE SEEING SOME CRYSTALS AND WANT HER TO COME BACK FOR ANOTHER TEST.  SHE IS ASKING IF SHE SHOULD GO AHEAD AND MAKE AN APPT WITH DR MARIA OR GO AHEAD AND SCHEDULE ANOTHER ROUND OF TESTING IN [place] FIRST, PLEASE CALL PT TO ADVISE WHAT IS RECOMMENDED.  ASKING FOR A CALL BACK ASAP.

## 2025-07-17 ENCOUNTER — TELEPHONE (OUTPATIENT)
Dept: ONCOLOGY | Facility: CLINIC | Age: 80
End: 2025-07-17
Payer: MEDICARE

## 2025-07-17 NOTE — TELEPHONE ENCOUNTER
The [facility] received a fax that requires your attention. The document has been indexed to the patient’s chart for your review.      Reason for sending: RECEIVED AND INDEXED REFERRAL AND RECORDS FOR ESTABLISHED PATIENT    Documents Description: REFERRAL 07/17/25, PROGRESS NOTE 07/10/25, LABS 07/10/25, MAMMO 07/07/25, 05/01/25.    Name of Sender: ERROL MORENO FRANCISCA [facility]  [phone]    Date Indexed: 07/17/25    Notes (if needed): PLEASE SCHEDULE     THANKS!  
Called and informed Raquel at [facility] that per previous phone note with patient Dr. Arias would see patient if the biopsy came back malignant, appointment not necessary until she is due for her 2 year follow up in 3/2027. Raquel verbalized understanding.   
Never

## (undated) DEVICE — INTRO ACCSR BLNT TP

## (undated) DEVICE — ENDOPATH XCEL UNIVERSAL TROCAR STABLILITY SLEEVES: Brand: ENDOPATH XCEL

## (undated) DEVICE — SYR LUERLOK 50ML

## (undated) DEVICE — APPL CHLORAPREP TINTED 26ML TEAL

## (undated) DEVICE — GOWN,PREVENTION PLUS,XXLARGE,STERILE: Brand: MEDLINE

## (undated) DEVICE — ANTIBACTERIAL UNDYED BRAIDED (POLYGLACTIN 910), SYNTHETIC ABSORBABLE SUTURE: Brand: COATED VICRYL

## (undated) DEVICE — SUT SILK 2/0 SH 30IN K833H

## (undated) DEVICE — MARYLAND JAW LAPAROSCOPIC SEALER/DIVIDER COATED: Brand: LIGASURE

## (undated) DEVICE — ENDOPOUCH RETRIEVER SPECIMEN RETRIEVAL BAGS: Brand: ENDOPOUCH RETRIEVER

## (undated) DEVICE — [HIGH FLOW INSUFFLATOR,  DO NOT USE IF PACKAGE IS DAMAGED,  KEEP DRY,  KEEP AWAY FROM SUNLIGHT,  PROTECT FROM HEAT AND RADIOACTIVE SOURCES.]: Brand: PNEUMOSURE

## (undated) DEVICE — SUT SILK 0 SH 30IN K834H

## (undated) DEVICE — ENDOPATH XCEL BLADELESS TROCARS WITH STABILITY SLEEVES: Brand: ENDOPATH XCEL

## (undated) DEVICE — "MH-438 A/W VLVE F/140 EVIS-140": Brand: AIR/WATER VALVE

## (undated) DEVICE — ENDOGATOR HYBRID TUBING KIT FOR USE WITH ENDOGATOR IRRIGATION PUMP, OLYMPUS PUMP, GI4000 ESU, AND TORRENT IRRIGATION PUMP.: Brand: ENDOGATOR KIT

## (undated) DEVICE — CONTN GRAD MEAS TRIANG 32OZ BLK

## (undated) DEVICE — PENROSE DRAIN 18 X .5" SILICONE: Brand: MEDLINE

## (undated) DEVICE — JELLY,LUBE,STERILE,FLIP TOP,TUBE,2-OZ: Brand: MEDLINE

## (undated) DEVICE — PATIENT RETURN ELECTRODE, SINGLE-USE, CONTACT QUALITY MONITORING, ADULT, WITH 9FT CORD, FOR PATIENTS WEIGING OVER 33LBS. (15KG): Brand: MEGADYNE

## (undated) DEVICE — KT BIOGUARD SXN VLV AIR/H20 4PC DISP

## (undated) DEVICE — 30977 SEE SHARP - ENHANCED INTRAOPERATIVE LAPAROSCOPE CLEANING & DEFOGGING: Brand: 30977 SEE SHARP - ENHANCED INTRAOPERATIVE LAPAROSCOPE CLEANING & DEFOGGING

## (undated) DEVICE — ENDOCUT SCISSOR TIP, DISPOSABLE: Brand: RENEW

## (undated) DEVICE — PK LAP LASR CHOLE 10

## (undated) DEVICE — ECHELON FLEX POWERED PLUS ARTICULATING ENDOSCOPIC LINEAR CUTTER , 60MM: Brand: ECHELON FLEX

## (undated) DEVICE — Device: Brand: AIR/WATER CHANNEL CLEANING ADAPTER

## (undated) DEVICE — "MH-443 SUCTION VALVE F/EVIS140 EVIS160": Brand: SUCTION VALVE

## (undated) DEVICE — INCISIONLINE PLEDGET SFT 22X1 2.75MM

## (undated) DEVICE — THE BITE BLOCK MAXI, LATEX FREE STRAP IS USED TO PROTECT THE ENDOSCOPE INSERTION TUBE FROM BEING BITTEN BY THE PATIENT.

## (undated) DEVICE — TUBING,OXYGEN,CRUSH RES,7',CLEAR,UC: Brand: MEDLINE INDUSTRIES, INC.

## (undated) DEVICE — SUT VIC 0 UR6 27IN VCP603H

## (undated) DEVICE — GLV SURG SENSICARE PI MIC PF SZ7.5 LF STRL

## (undated) DEVICE — TUBING, SUCTION, 1/4" X 10', STRAIGHT: Brand: MEDLINE

## (undated) DEVICE — INTENDED USE FOR SURGICAL MARKING ON INTACT SKIN, ALSO PROVIDES A PERMANENT METHOD OF IDENTIFYING OBJECTS IN THE OPERATING ROOM: Brand: WRITESITE® REGULAR TIP SKIN MARKER

## (undated) DEVICE — SINGLE-USE BIOPSY FORCEPS: Brand: RADIAL JAW 4

## (undated) DEVICE — PDS II VLT 0 107CM AG ST3: Brand: ENDOLOOP

## (undated) DEVICE — GLV SURG SENSICARE PI MIC PF SZ7 LF STRL